# Patient Record
Sex: MALE | Race: WHITE | Employment: UNEMPLOYED | URBAN - METROPOLITAN AREA
[De-identification: names, ages, dates, MRNs, and addresses within clinical notes are randomized per-mention and may not be internally consistent; named-entity substitution may affect disease eponyms.]

---

## 2021-07-29 ENCOUNTER — APPOINTMENT (OUTPATIENT)
Dept: CT IMAGING | Age: 41
End: 2021-07-29
Payer: COMMERCIAL

## 2021-07-29 ENCOUNTER — APPOINTMENT (OUTPATIENT)
Dept: GENERAL RADIOLOGY | Age: 41
End: 2021-07-29
Payer: COMMERCIAL

## 2021-07-29 ENCOUNTER — HOSPITAL ENCOUNTER (EMERGENCY)
Age: 41
Discharge: ANOTHER HOSPITAL | End: 2021-07-30
Attending: STUDENT IN AN ORGANIZED HEALTH CARE EDUCATION/TRAINING PROGRAM
Payer: COMMERCIAL

## 2021-07-29 DIAGNOSIS — F80.9 SPEECH AND LANGUAGE DEFICITS: ICD-10-CM

## 2021-07-29 DIAGNOSIS — G93.89 MASS OF BRAIN: Primary | ICD-10-CM

## 2021-07-29 LAB
A/G RATIO: 1.7 (ref 1.1–2.2)
ACETAMINOPHEN LEVEL: <5 UG/ML (ref 10–30)
ALBUMIN SERPL-MCNC: 4.5 G/DL (ref 3.4–5)
ALP BLD-CCNC: 65 U/L (ref 40–129)
ALT SERPL-CCNC: 66 U/L (ref 10–40)
AMPHETAMINE SCREEN, URINE: NORMAL
ANION GAP SERPL CALCULATED.3IONS-SCNC: 13 MMOL/L (ref 3–16)
AST SERPL-CCNC: 32 U/L (ref 15–37)
BARBITURATE SCREEN URINE: NORMAL
BASOPHILS ABSOLUTE: 0 K/UL (ref 0–0.2)
BASOPHILS RELATIVE PERCENT: 0.5 %
BENZODIAZEPINE SCREEN, URINE: NORMAL
BILIRUB SERPL-MCNC: 0.6 MG/DL (ref 0–1)
BILIRUBIN URINE: NEGATIVE
BLOOD, URINE: NEGATIVE
BUN BLDV-MCNC: 11 MG/DL (ref 7–20)
CALCIUM SERPL-MCNC: 9.3 MG/DL (ref 8.3–10.6)
CANNABINOID SCREEN URINE: NORMAL
CHLORIDE BLD-SCNC: 97 MMOL/L (ref 99–110)
CLARITY: CLEAR
CO2: 25 MMOL/L (ref 21–32)
COCAINE METABOLITE SCREEN URINE: NORMAL
COLOR: NORMAL
CREAT SERPL-MCNC: 1 MG/DL (ref 0.9–1.3)
EOSINOPHILS ABSOLUTE: 0 K/UL (ref 0–0.6)
EOSINOPHILS RELATIVE PERCENT: 0.2 %
ETHANOL: NORMAL MG/DL (ref 0–0.08)
GFR AFRICAN AMERICAN: >60
GFR NON-AFRICAN AMERICAN: >60
GLOBULIN: 2.6 G/DL
GLUCOSE BLD-MCNC: 134 MG/DL (ref 70–99)
GLUCOSE URINE: NEGATIVE MG/DL
HCT VFR BLD CALC: 44.3 % (ref 40.5–52.5)
HEMOGLOBIN: 15.9 G/DL (ref 13.5–17.5)
KETONES, URINE: NEGATIVE MG/DL
LEUKOCYTE ESTERASE, URINE: NEGATIVE
LYMPHOCYTES ABSOLUTE: 1.9 K/UL (ref 1–5.1)
LYMPHOCYTES RELATIVE PERCENT: 26.1 %
Lab: NORMAL
MCH RBC QN AUTO: 32.4 PG (ref 26–34)
MCHC RBC AUTO-ENTMCNC: 36 G/DL (ref 31–36)
MCV RBC AUTO: 89.9 FL (ref 80–100)
METHADONE SCREEN, URINE: NORMAL
MICROSCOPIC EXAMINATION: NORMAL
MONOCYTES ABSOLUTE: 0.5 K/UL (ref 0–1.3)
MONOCYTES RELATIVE PERCENT: 6.4 %
NEUTROPHILS ABSOLUTE: 4.9 K/UL (ref 1.7–7.7)
NEUTROPHILS RELATIVE PERCENT: 66.8 %
NITRITE, URINE: NEGATIVE
OPIATE SCREEN URINE: NORMAL
OXYCODONE URINE: NORMAL
PDW BLD-RTO: 13.2 % (ref 12.4–15.4)
PH UA: 6
PH UA: 7 (ref 5–8)
PHENCYCLIDINE SCREEN URINE: NORMAL
PLATELET # BLD: 263 K/UL (ref 135–450)
PMV BLD AUTO: 7.6 FL (ref 5–10.5)
POTASSIUM REFLEX MAGNESIUM: 3.9 MMOL/L (ref 3.5–5.1)
PROPOXYPHENE SCREEN: NORMAL
PROTEIN UA: NEGATIVE MG/DL
RBC # BLD: 4.92 M/UL (ref 4.2–5.9)
SALICYLATE, SERUM: <0.3 MG/DL (ref 15–30)
SODIUM BLD-SCNC: 135 MMOL/L (ref 136–145)
SPECIFIC GRAVITY UA: <=1.005 (ref 1–1.03)
TOTAL PROTEIN: 7.1 G/DL (ref 6.4–8.2)
TROPONIN: <0.01 NG/ML
URINE REFLEX TO CULTURE: NORMAL
URINE TYPE: NORMAL
UROBILINOGEN, URINE: 0.2 E.U./DL
WBC # BLD: 7.4 K/UL (ref 4–11)

## 2021-07-29 PROCEDURE — 93005 ELECTROCARDIOGRAM TRACING: CPT | Performed by: STUDENT IN AN ORGANIZED HEALTH CARE EDUCATION/TRAINING PROGRAM

## 2021-07-29 PROCEDURE — 70450 CT HEAD/BRAIN W/O DYE: CPT

## 2021-07-29 PROCEDURE — 70496 CT ANGIOGRAPHY HEAD: CPT

## 2021-07-29 PROCEDURE — 6360000002 HC RX W HCPCS: Performed by: STUDENT IN AN ORGANIZED HEALTH CARE EDUCATION/TRAINING PROGRAM

## 2021-07-29 PROCEDURE — 96365 THER/PROPH/DIAG IV INF INIT: CPT

## 2021-07-29 PROCEDURE — 85025 COMPLETE CBC W/AUTO DIFF WBC: CPT

## 2021-07-29 PROCEDURE — 80179 DRUG ASSAY SALICYLATE: CPT

## 2021-07-29 PROCEDURE — 2580000003 HC RX 258: Performed by: STUDENT IN AN ORGANIZED HEALTH CARE EDUCATION/TRAINING PROGRAM

## 2021-07-29 PROCEDURE — 82077 ASSAY SPEC XCP UR&BREATH IA: CPT

## 2021-07-29 PROCEDURE — 71046 X-RAY EXAM CHEST 2 VIEWS: CPT

## 2021-07-29 PROCEDURE — 81003 URINALYSIS AUTO W/O SCOPE: CPT

## 2021-07-29 PROCEDURE — 96375 TX/PRO/DX INJ NEW DRUG ADDON: CPT

## 2021-07-29 PROCEDURE — 6360000004 HC RX CONTRAST MEDICATION: Performed by: STUDENT IN AN ORGANIZED HEALTH CARE EDUCATION/TRAINING PROGRAM

## 2021-07-29 PROCEDURE — 6370000000 HC RX 637 (ALT 250 FOR IP): Performed by: STUDENT IN AN ORGANIZED HEALTH CARE EDUCATION/TRAINING PROGRAM

## 2021-07-29 PROCEDURE — 80307 DRUG TEST PRSMV CHEM ANLYZR: CPT

## 2021-07-29 PROCEDURE — 80053 COMPREHEN METABOLIC PANEL: CPT

## 2021-07-29 PROCEDURE — 80143 DRUG ASSAY ACETAMINOPHEN: CPT

## 2021-07-29 PROCEDURE — 99285 EMERGENCY DEPT VISIT HI MDM: CPT

## 2021-07-29 PROCEDURE — 84484 ASSAY OF TROPONIN QUANT: CPT

## 2021-07-29 RX ORDER — 0.9 % SODIUM CHLORIDE 0.9 %
1000 INTRAVENOUS SOLUTION INTRAVENOUS ONCE
Status: COMPLETED | OUTPATIENT
Start: 2021-07-29 | End: 2021-07-29

## 2021-07-29 RX ORDER — HYDROCODONE BITARTRATE AND ACETAMINOPHEN 5; 325 MG/1; MG/1
1 TABLET ORAL ONCE
Status: COMPLETED | OUTPATIENT
Start: 2021-07-30 | End: 2021-07-29

## 2021-07-29 RX ORDER — LEVETIRACETAM 10 MG/ML
1000 INJECTION INTRAVASCULAR ONCE
Status: COMPLETED | OUTPATIENT
Start: 2021-07-29 | End: 2021-07-29

## 2021-07-29 RX ORDER — DEXAMETHASONE SODIUM PHOSPHATE 10 MG/ML
10 INJECTION, SOLUTION INTRAMUSCULAR; INTRAVENOUS ONCE
Status: COMPLETED | OUTPATIENT
Start: 2021-07-29 | End: 2021-07-29

## 2021-07-29 RX ADMIN — HYDROCODONE BITARTRATE AND ACETAMINOPHEN 1 TABLET: 5; 325 TABLET ORAL at 23:58

## 2021-07-29 RX ADMIN — IOPAMIDOL 75 ML: 755 INJECTION, SOLUTION INTRAVENOUS at 22:04

## 2021-07-29 RX ADMIN — SODIUM CHLORIDE 1000 ML: 9 INJECTION, SOLUTION INTRAVENOUS at 20:55

## 2021-07-29 RX ADMIN — DEXAMETHASONE SODIUM PHOSPHATE 10 MG: 10 INJECTION, SOLUTION INTRAMUSCULAR; INTRAVENOUS at 23:19

## 2021-07-29 RX ADMIN — LEVETIRACETAM 1000 MG: 10 INJECTION INTRAVENOUS at 23:22

## 2021-07-29 ASSESSMENT — PAIN SCALES - GENERAL
PAINLEVEL_OUTOF10: 5
PAINLEVEL_OUTOF10: 5

## 2021-07-30 ENCOUNTER — ANESTHESIA EVENT (OUTPATIENT)
Dept: OPERATING ROOM | Age: 41
DRG: 021 | End: 2021-07-30
Payer: COMMERCIAL

## 2021-07-30 ENCOUNTER — HOSPITAL ENCOUNTER (INPATIENT)
Age: 41
LOS: 5 days | Discharge: HOME HEALTH | DRG: 021 | End: 2021-08-04
Attending: INTERNAL MEDICINE | Admitting: INTERNAL MEDICINE
Payer: COMMERCIAL

## 2021-07-30 ENCOUNTER — APPOINTMENT (OUTPATIENT)
Dept: MRI IMAGING | Age: 41
DRG: 021 | End: 2021-07-30
Attending: INTERNAL MEDICINE
Payer: COMMERCIAL

## 2021-07-30 ENCOUNTER — ANESTHESIA (OUTPATIENT)
Dept: OPERATING ROOM | Age: 41
DRG: 021 | End: 2021-07-30
Payer: COMMERCIAL

## 2021-07-30 ENCOUNTER — APPOINTMENT (OUTPATIENT)
Dept: CT IMAGING | Age: 41
DRG: 021 | End: 2021-07-30
Attending: INTERNAL MEDICINE
Payer: COMMERCIAL

## 2021-07-30 VITALS
HEART RATE: 68 BPM | TEMPERATURE: 97.8 F | RESPIRATION RATE: 16 BRPM | OXYGEN SATURATION: 100 % | DIASTOLIC BLOOD PRESSURE: 63 MMHG | SYSTOLIC BLOOD PRESSURE: 105 MMHG

## 2021-07-30 VITALS — SYSTOLIC BLOOD PRESSURE: 102 MMHG | OXYGEN SATURATION: 96 % | TEMPERATURE: 90.5 F | DIASTOLIC BLOOD PRESSURE: 57 MMHG

## 2021-07-30 DIAGNOSIS — Z98.890 S/P CRANIOTOMY: Primary | ICD-10-CM

## 2021-07-30 PROBLEM — R40.0 SOMNOLENCE: Status: ACTIVE | Noted: 2021-07-30

## 2021-07-30 PROBLEM — R47.02 DYSPHASIA: Status: ACTIVE | Noted: 2021-07-30

## 2021-07-30 LAB
A/G RATIO: 1.8 (ref 1.1–2.2)
ALBUMIN SERPL-MCNC: 4.2 G/DL (ref 3.4–5)
ALBUMIN SERPL-MCNC: 4.4 G/DL (ref 3.4–5)
ALP BLD-CCNC: 64 U/L (ref 40–129)
ALT SERPL-CCNC: 62 U/L (ref 10–40)
ANION GAP SERPL CALCULATED.3IONS-SCNC: 11 MMOL/L (ref 3–16)
ANION GAP SERPL CALCULATED.3IONS-SCNC: 11 MMOL/L (ref 3–16)
APTT: 33 SEC (ref 26.2–38.6)
AST SERPL-CCNC: 25 U/L (ref 15–37)
BASOPHILS ABSOLUTE: 0 K/UL (ref 0–0.2)
BASOPHILS RELATIVE PERCENT: 0.1 %
BILIRUB SERPL-MCNC: 0.6 MG/DL (ref 0–1)
BUN BLDV-MCNC: 11 MG/DL (ref 7–20)
BUN BLDV-MCNC: 13 MG/DL (ref 7–20)
C-REACTIVE PROTEIN: <3 MG/L (ref 0–5.1)
CALCIUM SERPL-MCNC: 9.2 MG/DL (ref 8.3–10.6)
CALCIUM SERPL-MCNC: 9.2 MG/DL (ref 8.3–10.6)
CHLORIDE BLD-SCNC: 102 MMOL/L (ref 99–110)
CHLORIDE BLD-SCNC: 103 MMOL/L (ref 99–110)
CO2: 23 MMOL/L (ref 21–32)
CO2: 24 MMOL/L (ref 21–32)
COLLAGEN ADENOSINE-5'-DIPHOSPHATE (ADP) TIME: 275 SEC (ref 56–110)
COLLAGEN EPINEPHRINE TIME: 105 SEC (ref 86–194)
CREAT SERPL-MCNC: 0.8 MG/DL (ref 0.9–1.3)
CREAT SERPL-MCNC: 0.8 MG/DL (ref 0.9–1.3)
EKG ATRIAL RATE: 79 BPM
EKG DIAGNOSIS: NORMAL
EKG P AXIS: 51 DEGREES
EKG P-R INTERVAL: 126 MS
EKG Q-T INTERVAL: 392 MS
EKG QRS DURATION: 92 MS
EKG QTC CALCULATION (BAZETT): 449 MS
EKG R AXIS: 64 DEGREES
EKG T AXIS: 52 DEGREES
EKG VENTRICULAR RATE: 79 BPM
EOSINOPHILS ABSOLUTE: 0 K/UL (ref 0–0.6)
EOSINOPHILS RELATIVE PERCENT: 0 %
FIBRINOGEN: 249 MG/DL (ref 200–397)
GFR AFRICAN AMERICAN: >60
GFR AFRICAN AMERICAN: >60
GFR NON-AFRICAN AMERICAN: >60
GFR NON-AFRICAN AMERICAN: >60
GLOBULIN: 2.5 G/DL
GLUCOSE BLD-MCNC: 132 MG/DL (ref 70–99)
GLUCOSE BLD-MCNC: 144 MG/DL (ref 70–99)
HAV IGM SER IA-ACNC: NORMAL
HCT VFR BLD CALC: 43.9 % (ref 40.5–52.5)
HCT VFR BLD CALC: 44.5 % (ref 40.5–52.5)
HEMOGLOBIN: 15.4 G/DL (ref 13.5–17.5)
HEMOGLOBIN: 15.7 G/DL (ref 13.5–17.5)
HEPATITIS B CORE IGM ANTIBODY: NORMAL
HEPATITIS B SURFACE ANTIGEN INTERPRETATION: NORMAL
HEPATITIS C ANTIBODY INTERPRETATION: NORMAL
INR BLD: 1.06 (ref 0.88–1.12)
LYMPHOCYTES ABSOLUTE: 1 K/UL (ref 1–5.1)
LYMPHOCYTES RELATIVE PERCENT: 11.6 %
MAGNESIUM: 2.6 MG/DL (ref 1.8–2.4)
MCH RBC QN AUTO: 32.2 PG (ref 26–34)
MCH RBC QN AUTO: 32.3 PG (ref 26–34)
MCHC RBC AUTO-ENTMCNC: 35.1 G/DL (ref 31–36)
MCHC RBC AUTO-ENTMCNC: 35.2 G/DL (ref 31–36)
MCV RBC AUTO: 91.7 FL (ref 80–100)
MCV RBC AUTO: 92.2 FL (ref 80–100)
MONOCYTES ABSOLUTE: 0.1 K/UL (ref 0–1.3)
MONOCYTES RELATIVE PERCENT: 1.3 %
NEUTROPHILS ABSOLUTE: 7.5 K/UL (ref 1.7–7.7)
NEUTROPHILS RELATIVE PERCENT: 87 %
PDW BLD-RTO: 13 % (ref 12.4–15.4)
PDW BLD-RTO: 13.2 % (ref 12.4–15.4)
PHOSPHORUS: 3.3 MG/DL (ref 2.5–4.9)
PLATELET # BLD: 306 K/UL (ref 135–450)
PLATELET # BLD: 306 K/UL (ref 135–450)
PLATELET FUNCTION INTERPRETATION: ABNORMAL
PMV BLD AUTO: 7.7 FL (ref 5–10.5)
PMV BLD AUTO: 7.8 FL (ref 5–10.5)
POTASSIUM SERPL-SCNC: 4.2 MMOL/L (ref 3.5–5.1)
POTASSIUM SERPL-SCNC: 4.3 MMOL/L (ref 3.5–5.1)
PROTHROMBIN TIME: 12 SEC (ref 9.9–12.7)
RBC # BLD: 4.76 M/UL (ref 4.2–5.9)
RBC # BLD: 4.86 M/UL (ref 4.2–5.9)
SEDIMENTATION RATE, ERYTHROCYTE: 4 MM/HR (ref 0–15)
SODIUM BLD-SCNC: 137 MMOL/L (ref 136–145)
SODIUM BLD-SCNC: 137 MMOL/L (ref 136–145)
TOTAL PROTEIN: 6.9 G/DL (ref 6.4–8.2)
WBC # BLD: 8.1 K/UL (ref 4–11)
WBC # BLD: 8.6 K/UL (ref 4–11)

## 2021-07-30 PROCEDURE — 93010 ELECTROCARDIOGRAM REPORT: CPT | Performed by: INTERNAL MEDICINE

## 2021-07-30 PROCEDURE — 86701 HIV-1ANTIBODY: CPT

## 2021-07-30 PROCEDURE — 00C00ZZ EXTIRPATION OF MATTER FROM BRAIN, OPEN APPROACH: ICD-10-PCS | Performed by: NEUROLOGICAL SURGERY

## 2021-07-30 PROCEDURE — 2720000010 HC SURG SUPPLY STERILE: Performed by: NEUROLOGICAL SURGERY

## 2021-07-30 PROCEDURE — 3600000014 HC SURGERY LEVEL 4 ADDTL 15MIN: Performed by: NEUROLOGICAL SURGERY

## 2021-07-30 PROCEDURE — 85610 PROTHROMBIN TIME: CPT

## 2021-07-30 PROCEDURE — 80074 ACUTE HEPATITIS PANEL: CPT

## 2021-07-30 PROCEDURE — 2580000003 HC RX 258: Performed by: NURSE ANESTHETIST, CERTIFIED REGISTERED

## 2021-07-30 PROCEDURE — 6370000000 HC RX 637 (ALT 250 FOR IP): Performed by: STUDENT IN AN ORGANIZED HEALTH CARE EDUCATION/TRAINING PROGRAM

## 2021-07-30 PROCEDURE — 7100000001 HC PACU RECOVERY - ADDTL 15 MIN: Performed by: NEUROLOGICAL SURGERY

## 2021-07-30 PROCEDURE — 36415 COLL VENOUS BLD VENIPUNCTURE: CPT

## 2021-07-30 PROCEDURE — 87390 HIV-1 AG IA: CPT

## 2021-07-30 PROCEDURE — 87015 SPECIMEN INFECT AGNT CONCNTJ: CPT

## 2021-07-30 PROCEDURE — 6360000002 HC RX W HCPCS: Performed by: ANESTHESIOLOGY

## 2021-07-30 PROCEDURE — 88112 CYTOPATH CELL ENHANCE TECH: CPT

## 2021-07-30 PROCEDURE — 87075 CULTR BACTERIA EXCEPT BLOOD: CPT

## 2021-07-30 PROCEDURE — 83735 ASSAY OF MAGNESIUM: CPT

## 2021-07-30 PROCEDURE — 87176 TISSUE HOMOGENIZATION CULTR: CPT

## 2021-07-30 PROCEDURE — 99255 IP/OBS CONSLTJ NEW/EST HI 80: CPT | Performed by: INTERNAL MEDICINE

## 2021-07-30 PROCEDURE — 2580000003 HC RX 258: Performed by: STUDENT IN AN ORGANIZED HEALTH CARE EDUCATION/TRAINING PROGRAM

## 2021-07-30 PROCEDURE — 86702 HIV-2 ANTIBODY: CPT

## 2021-07-30 PROCEDURE — 6360000002 HC RX W HCPCS: Performed by: NEUROLOGICAL SURGERY

## 2021-07-30 PROCEDURE — 2580000003 HC RX 258: Performed by: NEUROLOGICAL SURGERY

## 2021-07-30 PROCEDURE — 87077 CULTURE AEROBIC IDENTIFY: CPT

## 2021-07-30 PROCEDURE — 3600000004 HC SURGERY LEVEL 4 BASE: Performed by: NEUROLOGICAL SURGERY

## 2021-07-30 PROCEDURE — C1713 ANCHOR/SCREW BN/BN,TIS/BN: HCPCS | Performed by: NEUROLOGICAL SURGERY

## 2021-07-30 PROCEDURE — 2709999900 HC NON-CHARGEABLE SUPPLY: Performed by: NEUROLOGICAL SURGERY

## 2021-07-30 PROCEDURE — 6360000002 HC RX W HCPCS: Performed by: NURSE ANESTHETIST, CERTIFIED REGISTERED

## 2021-07-30 PROCEDURE — 3700000000 HC ANESTHESIA ATTENDED CARE: Performed by: NEUROLOGICAL SURGERY

## 2021-07-30 PROCEDURE — 87076 CULTURE ANAEROBE IDENT EACH: CPT

## 2021-07-30 PROCEDURE — 86360 T CELL ABSOLUTE COUNT/RATIO: CPT

## 2021-07-30 PROCEDURE — 87205 SMEAR GRAM STAIN: CPT

## 2021-07-30 PROCEDURE — 85730 THROMBOPLASTIN TIME PARTIAL: CPT

## 2021-07-30 PROCEDURE — 88305 TISSUE EXAM BY PATHOLOGIST: CPT

## 2021-07-30 PROCEDURE — 85384 FIBRINOGEN ACTIVITY: CPT

## 2021-07-30 PROCEDURE — 70553 MRI BRAIN STEM W/O & W/DYE: CPT

## 2021-07-30 PROCEDURE — 87070 CULTURE OTHR SPECIMN AEROBIC: CPT

## 2021-07-30 PROCEDURE — 2500000003 HC RX 250 WO HCPCS: Performed by: NEUROLOGICAL SURGERY

## 2021-07-30 PROCEDURE — A9576 INJ PROHANCE MULTIPACK: HCPCS | Performed by: NEUROLOGICAL SURGERY

## 2021-07-30 PROCEDURE — 2500000003 HC RX 250 WO HCPCS: Performed by: NURSE ANESTHETIST, CERTIFIED REGISTERED

## 2021-07-30 PROCEDURE — 6360000002 HC RX W HCPCS: Performed by: STUDENT IN AN ORGANIZED HEALTH CARE EDUCATION/TRAINING PROGRAM

## 2021-07-30 PROCEDURE — 3700000001 HC ADD 15 MINUTES (ANESTHESIA): Performed by: NEUROLOGICAL SURGERY

## 2021-07-30 PROCEDURE — 85025 COMPLETE CBC W/AUTO DIFF WBC: CPT

## 2021-07-30 PROCEDURE — 87206 SMEAR FLUORESCENT/ACID STAI: CPT

## 2021-07-30 PROCEDURE — 87153 DNA/RNA SEQUENCING: CPT

## 2021-07-30 PROCEDURE — 00B00ZX EXCISION OF BRAIN, OPEN APPROACH, DIAGNOSTIC: ICD-10-PCS | Performed by: NEUROLOGICAL SURGERY

## 2021-07-30 PROCEDURE — 6370000000 HC RX 637 (ALT 250 FOR IP): Performed by: NEUROLOGICAL SURGERY

## 2021-07-30 PROCEDURE — 2060000000 HC ICU INTERMEDIATE R&B

## 2021-07-30 PROCEDURE — 6360000004 HC RX CONTRAST MEDICATION: Performed by: NEUROLOGICAL SURGERY

## 2021-07-30 PROCEDURE — 87116 MYCOBACTERIA CULTURE: CPT

## 2021-07-30 PROCEDURE — 80053 COMPREHEN METABOLIC PANEL: CPT

## 2021-07-30 PROCEDURE — 85652 RBC SED RATE AUTOMATED: CPT

## 2021-07-30 PROCEDURE — 7100000000 HC PACU RECOVERY - FIRST 15 MIN: Performed by: NEUROLOGICAL SURGERY

## 2021-07-30 PROCEDURE — 89050 BODY FLUID CELL COUNT: CPT

## 2021-07-30 PROCEDURE — 85027 COMPLETE CBC AUTOMATED: CPT

## 2021-07-30 PROCEDURE — 71250 CT THORAX DX C-: CPT

## 2021-07-30 PROCEDURE — 85576 BLOOD PLATELET AGGREGATION: CPT

## 2021-07-30 PROCEDURE — 86140 C-REACTIVE PROTEIN: CPT

## 2021-07-30 PROCEDURE — 87102 FUNGUS ISOLATION CULTURE: CPT

## 2021-07-30 DEVICE — PLATE BNE L12MM THK0.4MM 2 H CRANIOMAXILLOFACIAL BILAT BLU: Type: IMPLANTABLE DEVICE | Site: CRANIAL | Status: FUNCTIONAL

## 2021-07-30 DEVICE — SCREW BNE L4MM DIA1.5MM CRANIOFACIAL TI SELF DRL: Type: IMPLANTABLE DEVICE | Site: CRANIAL | Status: FUNCTIONAL

## 2021-07-30 DEVICE — COVER BUR H DIA17MM 6 H CRANIOMAXILLOFACIAL BLU TI: Type: IMPLANTABLE DEVICE | Site: CRANIAL | Status: FUNCTIONAL

## 2021-07-30 RX ORDER — POLYETHYLENE GLYCOL 3350 17 G/17G
17 POWDER, FOR SOLUTION ORAL DAILY PRN
Status: DISCONTINUED | OUTPATIENT
Start: 2021-07-30 | End: 2021-07-31

## 2021-07-30 RX ORDER — OXYCODONE HYDROCHLORIDE AND ACETAMINOPHEN 5; 325 MG/1; MG/1
1 TABLET ORAL
Status: ACTIVE | OUTPATIENT
Start: 2021-07-30 | End: 2021-07-30

## 2021-07-30 RX ORDER — PROMETHAZINE HYDROCHLORIDE 25 MG/ML
6.25 INJECTION, SOLUTION INTRAMUSCULAR; INTRAVENOUS
Status: ACTIVE | OUTPATIENT
Start: 2021-07-30 | End: 2021-07-30

## 2021-07-30 RX ORDER — LABETALOL HYDROCHLORIDE 5 MG/ML
5 INJECTION, SOLUTION INTRAVENOUS EVERY 10 MIN PRN
Status: DISCONTINUED | OUTPATIENT
Start: 2021-07-30 | End: 2021-07-31

## 2021-07-30 RX ORDER — SODIUM CHLORIDE 9 MG/ML
INJECTION, SOLUTION INTRAVENOUS CONTINUOUS
Status: DISCONTINUED | OUTPATIENT
Start: 2021-07-30 | End: 2021-07-31

## 2021-07-30 RX ORDER — SODIUM CHLORIDE, SODIUM LACTATE, POTASSIUM CHLORIDE, CALCIUM CHLORIDE 600; 310; 30; 20 MG/100ML; MG/100ML; MG/100ML; MG/100ML
INJECTION, SOLUTION INTRAVENOUS CONTINUOUS PRN
Status: DISCONTINUED | OUTPATIENT
Start: 2021-07-30 | End: 2021-07-30 | Stop reason: SDUPTHER

## 2021-07-30 RX ORDER — FENTANYL CITRATE 50 UG/ML
50 INJECTION, SOLUTION INTRAMUSCULAR; INTRAVENOUS EVERY 5 MIN PRN
Status: DISCONTINUED | OUTPATIENT
Start: 2021-07-30 | End: 2021-07-31

## 2021-07-30 RX ORDER — HYDRALAZINE HYDROCHLORIDE 20 MG/ML
5 INJECTION INTRAMUSCULAR; INTRAVENOUS EVERY 10 MIN PRN
Status: DISCONTINUED | OUTPATIENT
Start: 2021-07-30 | End: 2021-07-31

## 2021-07-30 RX ORDER — DEXAMETHASONE 4 MG/1
4 TABLET ORAL EVERY 12 HOURS SCHEDULED
Status: DISCONTINUED | OUTPATIENT
Start: 2021-07-30 | End: 2021-07-30

## 2021-07-30 RX ORDER — SUCCINYLCHOLINE/SOD CL,ISO/PF 200MG/10ML
SYRINGE (ML) INTRAVENOUS PRN
Status: DISCONTINUED | OUTPATIENT
Start: 2021-07-30 | End: 2021-07-30 | Stop reason: SDUPTHER

## 2021-07-30 RX ORDER — FENTANYL CITRATE 50 UG/ML
25 INJECTION, SOLUTION INTRAMUSCULAR; INTRAVENOUS EVERY 5 MIN PRN
Status: DISCONTINUED | OUTPATIENT
Start: 2021-07-30 | End: 2021-07-31

## 2021-07-30 RX ORDER — PROPOFOL 10 MG/ML
INJECTION, EMULSION INTRAVENOUS PRN
Status: DISCONTINUED | OUTPATIENT
Start: 2021-07-30 | End: 2021-07-30 | Stop reason: SDUPTHER

## 2021-07-30 RX ORDER — CEFAZOLIN SODIUM 1 G/3ML
INJECTION, POWDER, FOR SOLUTION INTRAMUSCULAR; INTRAVENOUS PRN
Status: DISCONTINUED | OUTPATIENT
Start: 2021-07-30 | End: 2021-07-30 | Stop reason: SDUPTHER

## 2021-07-30 RX ORDER — MEPERIDINE HYDROCHLORIDE 25 MG/ML
12.5 INJECTION INTRAMUSCULAR; INTRAVENOUS; SUBCUTANEOUS EVERY 5 MIN PRN
Status: DISCONTINUED | OUTPATIENT
Start: 2021-07-30 | End: 2021-07-31

## 2021-07-30 RX ORDER — LEVETIRACETAM 500 MG/1
500 TABLET ORAL 2 TIMES DAILY
Status: DISCONTINUED | OUTPATIENT
Start: 2021-07-30 | End: 2021-07-31

## 2021-07-30 RX ORDER — REMIFENTANIL HYDROCHLORIDE 1 MG/ML
INJECTION, POWDER, LYOPHILIZED, FOR SOLUTION INTRAVENOUS CONTINUOUS PRN
Status: DISCONTINUED | OUTPATIENT
Start: 2021-07-30 | End: 2021-07-30 | Stop reason: SDUPTHER

## 2021-07-30 RX ORDER — SODIUM CHLORIDE 9 MG/ML
25 INJECTION, SOLUTION INTRAVENOUS PRN
Status: DISCONTINUED | OUTPATIENT
Start: 2021-07-30 | End: 2021-07-31

## 2021-07-30 RX ORDER — LIDOCAINE HYDROCHLORIDE AND EPINEPHRINE 10; 10 MG/ML; UG/ML
INJECTION, SOLUTION INFILTRATION; PERINEURAL PRN
Status: DISCONTINUED | OUTPATIENT
Start: 2021-07-30 | End: 2021-07-31

## 2021-07-30 RX ORDER — PANTOPRAZOLE SODIUM 40 MG/1
40 TABLET, DELAYED RELEASE ORAL
Status: DISCONTINUED | OUTPATIENT
Start: 2021-07-31 | End: 2021-08-04 | Stop reason: HOSPADM

## 2021-07-30 RX ORDER — ONDANSETRON 4 MG/1
4 TABLET, ORALLY DISINTEGRATING ORAL EVERY 8 HOURS PRN
Status: DISCONTINUED | OUTPATIENT
Start: 2021-07-30 | End: 2021-07-31

## 2021-07-30 RX ORDER — DEXAMETHASONE SODIUM PHOSPHATE 4 MG/ML
INJECTION, SOLUTION INTRA-ARTICULAR; INTRALESIONAL; INTRAMUSCULAR; INTRAVENOUS; SOFT TISSUE PRN
Status: DISCONTINUED | OUTPATIENT
Start: 2021-07-30 | End: 2021-07-30 | Stop reason: SDUPTHER

## 2021-07-30 RX ORDER — ONDANSETRON 2 MG/ML
4 INJECTION INTRAMUSCULAR; INTRAVENOUS
Status: ACTIVE | OUTPATIENT
Start: 2021-07-30 | End: 2021-07-30

## 2021-07-30 RX ORDER — FENTANYL CITRATE 50 UG/ML
INJECTION, SOLUTION INTRAMUSCULAR; INTRAVENOUS PRN
Status: DISCONTINUED | OUTPATIENT
Start: 2021-07-30 | End: 2021-07-30 | Stop reason: SDUPTHER

## 2021-07-30 RX ORDER — LABETALOL HYDROCHLORIDE 5 MG/ML
10 INJECTION, SOLUTION INTRAVENOUS EVERY 6 HOURS PRN
Status: DISCONTINUED | OUTPATIENT
Start: 2021-07-30 | End: 2021-08-04 | Stop reason: HOSPADM

## 2021-07-30 RX ORDER — DEXAMETHASONE 4 MG/1
4 TABLET ORAL EVERY 6 HOURS
Status: DISCONTINUED | OUTPATIENT
Start: 2021-07-30 | End: 2021-07-31

## 2021-07-30 RX ORDER — ACETAMINOPHEN 650 MG/1
650 SUPPOSITORY RECTAL EVERY 6 HOURS PRN
Status: DISCONTINUED | OUTPATIENT
Start: 2021-07-30 | End: 2021-07-31

## 2021-07-30 RX ORDER — ONDANSETRON 2 MG/ML
4 INJECTION INTRAMUSCULAR; INTRAVENOUS EVERY 6 HOURS PRN
Status: DISCONTINUED | OUTPATIENT
Start: 2021-07-30 | End: 2021-07-31

## 2021-07-30 RX ORDER — LEVETIRACETAM 500 MG/1
1000 TABLET ORAL 2 TIMES DAILY
Status: CANCELLED | OUTPATIENT
Start: 2021-07-31

## 2021-07-30 RX ORDER — PROPOFOL 10 MG/ML
INJECTION, EMULSION INTRAVENOUS CONTINUOUS PRN
Status: DISCONTINUED | OUTPATIENT
Start: 2021-07-30 | End: 2021-07-30 | Stop reason: SDUPTHER

## 2021-07-30 RX ORDER — ONDANSETRON 2 MG/ML
INJECTION INTRAMUSCULAR; INTRAVENOUS PRN
Status: DISCONTINUED | OUTPATIENT
Start: 2021-07-30 | End: 2021-07-30 | Stop reason: SDUPTHER

## 2021-07-30 RX ORDER — SODIUM CHLORIDE 0.9 % (FLUSH) 0.9 %
5-40 SYRINGE (ML) INJECTION PRN
Status: DISCONTINUED | OUTPATIENT
Start: 2021-07-30 | End: 2021-07-31

## 2021-07-30 RX ORDER — SODIUM CHLORIDE 9 MG/ML
INJECTION, SOLUTION INTRAVENOUS CONTINUOUS PRN
Status: DISCONTINUED | OUTPATIENT
Start: 2021-07-30 | End: 2021-07-30 | Stop reason: SDUPTHER

## 2021-07-30 RX ORDER — NALOXONE HYDROCHLORIDE 0.4 MG/ML
INJECTION, SOLUTION INTRAMUSCULAR; INTRAVENOUS; SUBCUTANEOUS PRN
Status: DISCONTINUED | OUTPATIENT
Start: 2021-07-30 | End: 2021-07-30 | Stop reason: SDUPTHER

## 2021-07-30 RX ORDER — LEVETIRACETAM 500 MG/5ML
INJECTION, SOLUTION, CONCENTRATE INTRAVENOUS PRN
Status: DISCONTINUED | OUTPATIENT
Start: 2021-07-30 | End: 2021-07-30 | Stop reason: SDUPTHER

## 2021-07-30 RX ORDER — SODIUM CHLORIDE 0.9 % (FLUSH) 0.9 %
5-40 SYRINGE (ML) INJECTION EVERY 12 HOURS SCHEDULED
Status: DISCONTINUED | OUTPATIENT
Start: 2021-07-30 | End: 2021-07-31

## 2021-07-30 RX ORDER — ACETAMINOPHEN 325 MG/1
650 TABLET ORAL EVERY 6 HOURS PRN
Status: DISCONTINUED | OUTPATIENT
Start: 2021-07-30 | End: 2021-07-31

## 2021-07-30 RX ADMIN — DEXAMETHASONE 4 MG: 4 TABLET ORAL at 05:20

## 2021-07-30 RX ADMIN — PROPOFOL 200 MG: 10 INJECTION, EMULSION INTRAVENOUS at 18:24

## 2021-07-30 RX ADMIN — ACETAMINOPHEN 650 MG: 325 TABLET ORAL at 15:42

## 2021-07-30 RX ADMIN — ONDANSETRON 4 MG: 2 INJECTION INTRAMUSCULAR; INTRAVENOUS at 19:11

## 2021-07-30 RX ADMIN — REMIFENTANIL HYDROCHLORIDE 0.25 MCG/KG/MIN: 1 INJECTION, POWDER, LYOPHILIZED, FOR SOLUTION INTRAVENOUS at 20:08

## 2021-07-30 RX ADMIN — DEXAMETHASONE 4 MG: 4 TABLET ORAL at 11:18

## 2021-07-30 RX ADMIN — FENTANYL CITRATE 100 MCG: 50 INJECTION, SOLUTION INTRAMUSCULAR; INTRAVENOUS at 18:50

## 2021-07-30 RX ADMIN — FENTANYL CITRATE 25 MCG: 50 INJECTION, SOLUTION INTRAMUSCULAR; INTRAVENOUS at 23:45

## 2021-07-30 RX ADMIN — GADOTERIDOL 18 ML: 279.3 INJECTION, SOLUTION INTRAVENOUS at 08:50

## 2021-07-30 RX ADMIN — FENTANYL CITRATE 100 MCG: 50 INJECTION, SOLUTION INTRAMUSCULAR; INTRAVENOUS at 22:02

## 2021-07-30 RX ADMIN — NALOXONE HYDROCHLORIDE 0.08 MG: 0.4 INJECTION, SOLUTION INTRAMUSCULAR; INTRAVENOUS; SUBCUTANEOUS at 22:31

## 2021-07-30 RX ADMIN — MEPERIDINE HYDROCHLORIDE 12.5 MG: 25 INJECTION, SOLUTION INTRAMUSCULAR; INTRAVENOUS; SUBCUTANEOUS at 23:20

## 2021-07-30 RX ADMIN — SODIUM CHLORIDE: 9 INJECTION, SOLUTION INTRAVENOUS at 21:12

## 2021-07-30 RX ADMIN — LEVETIRACETAM 500 MG: 500 TABLET ORAL at 09:55

## 2021-07-30 RX ADMIN — REMIFENTANIL HYDROCHLORIDE 0.15 MCG/KG/MIN: 1 INJECTION, POWDER, LYOPHILIZED, FOR SOLUTION INTRAVENOUS at 18:28

## 2021-07-30 RX ADMIN — MEPERIDINE HYDROCHLORIDE 12.5 MG: 25 INJECTION, SOLUTION INTRAMUSCULAR; INTRAVENOUS; SUBCUTANEOUS at 23:25

## 2021-07-30 RX ADMIN — SODIUM CHLORIDE, SODIUM LACTATE, POTASSIUM CHLORIDE, AND CALCIUM CHLORIDE: .6; .31; .03; .02 INJECTION, SOLUTION INTRAVENOUS at 18:17

## 2021-07-30 RX ADMIN — PROPOFOL 50 MG: 10 INJECTION, EMULSION INTRAVENOUS at 18:27

## 2021-07-30 RX ADMIN — CEFAZOLIN SODIUM 2000 MG: 1 POWDER, FOR SOLUTION INTRAMUSCULAR; INTRAVENOUS at 19:07

## 2021-07-30 RX ADMIN — DEXAMETHASONE SODIUM PHOSPHATE 10 MG: 4 INJECTION, SOLUTION INTRAMUSCULAR; INTRAVENOUS at 19:10

## 2021-07-30 RX ADMIN — ACETAMINOPHEN 650 MG: 325 TABLET ORAL at 05:20

## 2021-07-30 RX ADMIN — PROPOFOL 150 MCG/KG/MIN: 10 INJECTION, EMULSION INTRAVENOUS at 18:29

## 2021-07-30 RX ADMIN — REMIFENTANIL HYDROCHLORIDE 0.25 MCG/KG/MIN: 1 INJECTION, POWDER, LYOPHILIZED, FOR SOLUTION INTRAVENOUS at 21:34

## 2021-07-30 RX ADMIN — Medication 10 ML: at 09:58

## 2021-07-30 RX ADMIN — Medication 100 MG: at 18:24

## 2021-07-30 RX ADMIN — ACETAMINOPHEN 650 MG: 325 TABLET ORAL at 11:17

## 2021-07-30 RX ADMIN — PHENYLEPHRINE HYDROCHLORIDE 20 MCG/MIN: 10 INJECTION INTRAVENOUS at 19:01

## 2021-07-30 RX ADMIN — SODIUM CHLORIDE: 9 INJECTION, SOLUTION INTRAVENOUS at 23:22

## 2021-07-30 RX ADMIN — LEVETIRACETAM 1000 MG: 100 INJECTION, SOLUTION INTRAVENOUS at 19:16

## 2021-07-30 ASSESSMENT — PULMONARY FUNCTION TESTS
PIF_VALUE: 27
PIF_VALUE: 18
PIF_VALUE: 19
PIF_VALUE: 16
PIF_VALUE: 17
PIF_VALUE: 19
PIF_VALUE: 16
PIF_VALUE: 18
PIF_VALUE: 15
PIF_VALUE: 15
PIF_VALUE: 18
PIF_VALUE: 18
PIF_VALUE: 19
PIF_VALUE: 15
PIF_VALUE: 18
PIF_VALUE: 5
PIF_VALUE: 17
PIF_VALUE: 13
PIF_VALUE: 15
PIF_VALUE: 24
PIF_VALUE: 15
PIF_VALUE: 15
PIF_VALUE: 18
PIF_VALUE: 1
PIF_VALUE: 18
PIF_VALUE: 9
PIF_VALUE: 18
PIF_VALUE: 15
PIF_VALUE: 18
PIF_VALUE: 17
PIF_VALUE: 15
PIF_VALUE: 18
PIF_VALUE: 15
PIF_VALUE: 15
PIF_VALUE: 17
PIF_VALUE: 18
PIF_VALUE: 15
PIF_VALUE: 17
PIF_VALUE: 12
PIF_VALUE: 24
PIF_VALUE: 19
PIF_VALUE: 27
PIF_VALUE: 15
PIF_VALUE: 19
PIF_VALUE: 16
PIF_VALUE: 2
PIF_VALUE: 18
PIF_VALUE: 15
PIF_VALUE: 18
PIF_VALUE: 2
PIF_VALUE: 18
PIF_VALUE: 13
PIF_VALUE: 11
PIF_VALUE: 17
PIF_VALUE: 17
PIF_VALUE: 13
PIF_VALUE: 15
PIF_VALUE: 24
PIF_VALUE: 11
PIF_VALUE: 18
PIF_VALUE: 18
PIF_VALUE: 16
PIF_VALUE: 18
PIF_VALUE: 18
PIF_VALUE: 14
PIF_VALUE: 18
PIF_VALUE: 24
PIF_VALUE: 18
PIF_VALUE: 21
PIF_VALUE: 12
PIF_VALUE: 17
PIF_VALUE: 15
PIF_VALUE: 18
PIF_VALUE: 18
PIF_VALUE: 15
PIF_VALUE: 15
PIF_VALUE: 18
PIF_VALUE: 19
PIF_VALUE: 18
PIF_VALUE: 15
PIF_VALUE: 18
PIF_VALUE: 18
PIF_VALUE: 25
PIF_VALUE: 15
PIF_VALUE: 15
PIF_VALUE: 14
PIF_VALUE: 18
PIF_VALUE: 15
PIF_VALUE: 18
PIF_VALUE: 18
PIF_VALUE: 2
PIF_VALUE: 15
PIF_VALUE: 2
PIF_VALUE: 18
PIF_VALUE: 18
PIF_VALUE: 1
PIF_VALUE: 19
PIF_VALUE: 18
PIF_VALUE: 15
PIF_VALUE: 18
PIF_VALUE: 4
PIF_VALUE: 24
PIF_VALUE: 18
PIF_VALUE: 12
PIF_VALUE: 27
PIF_VALUE: 19
PIF_VALUE: 18
PIF_VALUE: 1
PIF_VALUE: 14
PIF_VALUE: 17
PIF_VALUE: 24
PIF_VALUE: 18
PIF_VALUE: 18
PIF_VALUE: 15
PIF_VALUE: 1
PIF_VALUE: 14
PIF_VALUE: 14
PIF_VALUE: 18
PIF_VALUE: 13
PIF_VALUE: 18
PIF_VALUE: 14
PIF_VALUE: 15
PIF_VALUE: 24
PIF_VALUE: 1
PIF_VALUE: 18
PIF_VALUE: 20
PIF_VALUE: 14
PIF_VALUE: 15
PIF_VALUE: 13
PIF_VALUE: 18
PIF_VALUE: 10
PIF_VALUE: 15
PIF_VALUE: 13
PIF_VALUE: 14
PIF_VALUE: 15
PIF_VALUE: 18
PIF_VALUE: 1
PIF_VALUE: 13
PIF_VALUE: 18
PIF_VALUE: 13
PIF_VALUE: 19
PIF_VALUE: 15
PIF_VALUE: 24
PIF_VALUE: 15
PIF_VALUE: 19
PIF_VALUE: 18
PIF_VALUE: 16
PIF_VALUE: 18
PIF_VALUE: 16
PIF_VALUE: 21
PIF_VALUE: 19
PIF_VALUE: 15
PIF_VALUE: 12
PIF_VALUE: 18
PIF_VALUE: 16
PIF_VALUE: 15
PIF_VALUE: 11
PIF_VALUE: 18
PIF_VALUE: 1
PIF_VALUE: 14
PIF_VALUE: 15
PIF_VALUE: 21
PIF_VALUE: 16
PIF_VALUE: 18
PIF_VALUE: 19
PIF_VALUE: 13
PIF_VALUE: 12
PIF_VALUE: 15
PIF_VALUE: 18
PIF_VALUE: 15
PIF_VALUE: 18
PIF_VALUE: 19
PIF_VALUE: 18
PIF_VALUE: 18
PIF_VALUE: 14
PIF_VALUE: 19
PIF_VALUE: 19
PIF_VALUE: 13
PIF_VALUE: 18
PIF_VALUE: 18
PIF_VALUE: 17
PIF_VALUE: 11
PIF_VALUE: 18
PIF_VALUE: 3
PIF_VALUE: 17
PIF_VALUE: 18
PIF_VALUE: 19
PIF_VALUE: 18
PIF_VALUE: 19
PIF_VALUE: 15
PIF_VALUE: 18
PIF_VALUE: 14
PIF_VALUE: 18
PIF_VALUE: 15
PIF_VALUE: 18
PIF_VALUE: 3
PIF_VALUE: 18
PIF_VALUE: 19
PIF_VALUE: 21
PIF_VALUE: 18
PIF_VALUE: 8
PIF_VALUE: 18
PIF_VALUE: 24
PIF_VALUE: 15
PIF_VALUE: 18
PIF_VALUE: 19
PIF_VALUE: 17
PIF_VALUE: 17
PIF_VALUE: 23
PIF_VALUE: 14
PIF_VALUE: 19
PIF_VALUE: 15
PIF_VALUE: 26
PIF_VALUE: 24
PIF_VALUE: 18
PIF_VALUE: 13
PIF_VALUE: 18
PIF_VALUE: 27
PIF_VALUE: 18
PIF_VALUE: 15
PIF_VALUE: 14
PIF_VALUE: 13
PIF_VALUE: 16
PIF_VALUE: 14
PIF_VALUE: 12
PIF_VALUE: 18
PIF_VALUE: 15
PIF_VALUE: 19
PIF_VALUE: 12
PIF_VALUE: 17
PIF_VALUE: 18

## 2021-07-30 ASSESSMENT — PAIN DESCRIPTION - PAIN TYPE
TYPE: ACUTE PAIN
TYPE: SURGICAL PAIN

## 2021-07-30 ASSESSMENT — PAIN SCALES - GENERAL
PAINLEVEL_OUTOF10: 3
PAINLEVEL_OUTOF10: 5
PAINLEVEL_OUTOF10: 0
PAINLEVEL_OUTOF10: 0
PAINLEVEL_OUTOF10: 3

## 2021-07-30 ASSESSMENT — PAIN DESCRIPTION - FREQUENCY: FREQUENCY: INTERMITTENT

## 2021-07-30 ASSESSMENT — LIFESTYLE VARIABLES: SMOKING_STATUS: 0

## 2021-07-30 ASSESSMENT — ENCOUNTER SYMPTOMS
VOMITING: 0
ABDOMINAL PAIN: 0
COUGH: 0
SHORTNESS OF BREATH: 0
ABDOMINAL DISTENTION: 0
TROUBLE SWALLOWING: 0
NAUSEA: 0

## 2021-07-30 ASSESSMENT — PAIN DESCRIPTION - DESCRIPTORS
DESCRIPTORS: OTHER (COMMENT)
DESCRIPTORS: HEADACHE

## 2021-07-30 ASSESSMENT — PAIN DESCRIPTION - PROGRESSION: CLINICAL_PROGRESSION: NOT CHANGED

## 2021-07-30 ASSESSMENT — PAIN DESCRIPTION - ORIENTATION: ORIENTATION: OTHER (COMMENT)

## 2021-07-30 ASSESSMENT — PAIN DESCRIPTION - LOCATION
LOCATION: HEAD
LOCATION: HEAD

## 2021-07-30 ASSESSMENT — PAIN DESCRIPTION - ONSET: ONSET: ON-GOING

## 2021-07-30 NOTE — CONSULTS
Infectious Diseases Inpatient Consult Note    RESIDENT NOTE - reviewed / edited, attending note at bottom    Reason for Consult:   Multiple brain masses  Requesting Physician:   Dr Marylin Dooley  Primary Care Physician:  No primary care provider on file. History Obtained From:   Pt, EPIC    Admit Date: 2021  Hospital Day: 1    CHIEF COMPLAINT:     HA, difficulty speaking, dropping things    HISTORY OF PRESENT ILLNESS:    Pj Lacy is a 46 y.o. male with no PMH who presents to the emergency department brought in by his father with c/o AMS and speech difficulty. Onset gradual, over the past 1.5 months, his father has noticed a progressive difficulty in speech, particularly finding the right words and occasionally slurring his words. Pt has not been seen due to cost and being uninsured. Last night having dinner at 1900 the pt felt weak and drop dish of spaghetti and his cup of water. His dad saw this and immediately brought him to the hospital. In the ED the pt could not remember his name,  etc.  After given the IV steriod, the pt said he knew the answers but couldn't get the answers out and was able to answer the questions correctly. In  his mother had symptoms that involved small projection in the skull and could not find out what it was . One night she had a intense headache and went to ED and found out she had aggressive bone tumors involving ribs pelvis, skull. 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 intermittnet visual changes, dropping things at dinner prompting Headaches    In the ED, CT revealed multiple cystic masses with minimal thin peripheral enhancement, vasogenic edema, and mass effect with 7mm midline shift. He was transferred to [facility]. for further management. Today patient is sitting in bed with neck raised. He had some difficulty answering my questions and stares blankly. Couldn't tell me what was bothering him.  No fevers, chills, n/v, abdominal pain, dysuria, diarrhea. Most of the history taken from patient's father. He has been living at home with his father with his entire live his entire life. He is currently unemployed but used to work for a [employer] center. They have no pets at home, but live in a old house. He does not leave the house much. His hobbies consist mostly of video games. The patient reports that he vapes daily, denies alcohol use, denies any illicit drug use, patient has not been sexually active in the last 6 months but does report men as his sexual preference. Patient does not see any other reason. He has had a bad tooth that his father said is cracked and he has not seen a dentist yet. The only medications he takes are vitamins. Past Medical History:    No past medical history on file. Past Surgical History:    No past surgical history on file. Current Medications:     sodium chloride flush  5-40 mL Intravenous 2 times per day    levETIRAcetam  500 mg Oral BID    dexamethasone  4 mg Oral Q6H    [START ON 7/31/2021] pantoprazole  40 mg Oral QAM AC       Allergies:  Patient has no known allergies. Social History:    TOBACCO:    Vapes  tobacco  ETOH:    Reports no alcohol use  DRUGS:   Reports no drug use  MARITAL STATUS:   Single  OCCUPATION:   Unemployed    Patient lives lives with his father    Family History:   No immunodeficiency    REVIEW OF SYSTEMS:    No fever / chills / sweats. No weight loss. No visual change, eye pain, eye discharge. No oral lesion, sore throat, dysphagia. Denies cough / sputum. Denies chest pain, palpitations. Denies n / v / abd pain. No diarrhea. Denies dysuria or change in urinary function. Denies joint swelling or pain. No myalgia, arthralgia.   Denies skin changes, itching  Denies focal weakness, sensory change or other neurologic symptom    Denies new / worse depression, psychiatric symptoms    PHYSICAL EXAM:      Vitals:    /70   Pulse 68   Temp 97.5 °F (36.4 °C) (Oral) Resp 16   Wt 182 lb 8.7 oz (82.8 kg)   SpO2 96%     GENERAL: No apparent distress. RA  HEENT: Membranes moist, no oral lesion, PERRL  NECK:  Supple, no lymphadenopathy  LUNGS: Clear b/l, no rales, no dullness  CARDIAC: RRR, no murmur appreciated  ABD:  + BS, soft / NT  EXT:  No rash, no edema, no lesions  NEURO: No focal neurologic findings  PSYCH: Orientation, sensorium, mood normal  LINES:  Peripheral iv    DATA:    Lab Results   Component Value Date    WBC 8.6 2021    HGB 15.4 2021    HCT 43.9 2021    MCV 92.2 2021     2021     Lab Results   Component Value Date    CREATININE 0.8 (L) 2021    BUN 13 2021     2021    K 4.3 2021     2021    CO2 24 2021       Hepatic Function Panel:   Lab Results   Component Value Date    ALKPHOS 64 2021    ALT 62 2021    AST 25 2021    PROT 6.9 2021    BILITOT 0.6 2021    LABALBU 4.4 2021 ESR 4, CRP <3.0    Micro:  --    Imagin/30 Chest / Abd / Pelvic CT  CHEST:   1.  No evidence of metastatic disease in the chest.   2.  No acute intrathoracic abnormality.       ABDOMEN/PELVIS:   1.  No evidence of metastatic disease in the abdomen and pelvis. 2.  No acute intra-abdominopelvic abnormality.  MRI BRIAIN W WO CONTRAST  1. Bilateral cerebral intra-axial thin ring-enhancing cystic lesions with surrounding vasogenic edema (dominant lesions with incomplete ring enhancement at the cortical gray matter margin) and no internal diffusion restriction. The thin well-defined ring of enhancement and no internal diffusion restriction raises concern for nonpyrogenic abscesses, such as fungal or paracytic infection. Cystic metastases are a possibility, but considered less likely given the thin well-defined ring enhancement.    Incomplete ring of enhancement along the cortical gray matter aspect of the lesions raises the thought of demyelinating disease, but this is unlikely given the hypointense signal on DWI images and the morphology of well-defined thin ring enhancement. 2. Stable mild right subfalcine herniation and uncal herniation     7/29 CT HEAD WO CONTRAST  1. At least 5 cystic masses in the bilateral cerebral hemispheres demonstrate  minimal thin peripheral enhancement.  Surrounding vasogenic edema and mass  effect with right to left midline shift measuring 7 mm and diffuse sulcal  effacement throughout the bilateral cerebral hemispheres.  Crowding of the  right quadrigeminal and ambient cisterns.  These masses may represent  intracranial metastatic disease.  Additional considerations include  neurocysticercosis and cerebral abscesses in the appropriate clinical  setting.  Tumefactive demyelination is considered less likely but difficult  to entirely exclude.  Recommend follow-up brain MRI with and without  intravenous contrast for further evaluation.  Additionally, follow-up CT  chest, abdomen and pelvis may be helpful. CTA HEAD NECK W CONTRAST  2. No acute abnormality in the large arteries of the head and neck.  No focal  hemodynamically significant stenosis or occlusion. IMPRESSION:      Patient Active Problem List   Diagnosis    Brain mass       Mitch Boeck is a 46 y.o. male with no PMH who presents to the emergency department brought in by his father with c/o AMS and speech difficulty. For the last 1.5 moths  Possible brain abscess vs metastasis  Pathogens include  neurocysticercosis  -toxoplasmosis  -nocardia    RECOMMENDATIONS:  -patient is getting brain biopsy with neurosurgery this evening  -await cultures / path    Discussed with Dr. Baldemar Hamilton MD     Addendum to Resident Consult note:  Pt seen, examined and evaluated. I have reviewed the current history, physical findings, labs and assessment and plan and agree with note as documented by resident (Dr. Stevenson Leslie).     45 yo man with no known PMH.  + cig use     Pt reports 1-2 mo difficulty with speech, unable to find words, slurred speech. He has had blurred vision, bilateral.    Less awake / alert over 1-2 days  He developed inability to hold a cup / plate with his R hand on 7/29 and then presented to [facility] ED    In ED, afeb. Abnormal CT with mult cystic cerebral lesions  Transferred and admitted to [facility] on 7/30  Had MRI - mult cystic cerebral lesions with edema   Evaluation by Neurosurg, plan for brain biopsy later today    IMP/  CNS lesion, concern infection though not typical for bacterial ella abscesse   No hx or indication of immunocompromised    No hx or known primary cancer (chest / abd / pelvic CT neg)    REC/  Wound not start any empiric antimicrobial agents at this time  Agree with obtaining HIV test, CD4 count   Obtain immunoglobins  Agree with plan for neurosurgical biopsy for culture and path    Medical Decision Making: The following items were considered in medical decision making:  Discussion of patient care with other providers  Reviewed clinical lab tests  Reviewed radiology tests  Reviewed other diagnostic tests/interventions  Independent review of radiologic images - reviewed with Radiologist   Microbiology cultures and other micro tests reviewed      Risk of Complications/Morbidity: High   Illness(es)/ Infection present that pose threat to bodily function. There is potential for severe exacerbation of infection/side effects of treatment.   Therapy requires intensive monitoring for antimicrobial agent toxicity    Discussed with pt, Medical Resident, Attending - Dr Belkis Shane, Radiologist  Corinne Ceja MD

## 2021-07-30 NOTE — PLAN OF CARE
Problem: Falls - Risk of:  Goal: Will remain free from falls  Description: Will remain free from falls  Outcome: Ongoing   Patient remains free from falls during this shift. Patient is up x1 person assist with a gait belt. Bed is in the lowest position and the bed alarm is activated. Anti-slip socks are on. Call light is within reach. Will continue to monitor and reassess. Problem: Verbal Communication - Impaired:  Goal: Functional communication will improve  Description: Functional communication will improve  Outcome: Ongoing   Patient continues to have aphasia and dysarthria. Will continue to monitor and reassess.

## 2021-07-30 NOTE — ED PROVIDER NOTES
[address] [facility]      Pt Name: Jade Howard  MRN: [id number]  [place] 1980  Date of evaluation: 7/29/2021  Provider: Abimael Ge MD    CHIEF COMPLAINT       Chief Complaint   Patient presents with    Aphasia     pt here with father with c/o slurred speech and trouble with his vison x \"a few weeks\". pt father states pt was dropping things at dinner tonight that made them come in. HISTORY OF PRESENT ILLNESS   (Location/Symptom, Timing/Onset,Context/Setting, Quality, Duration, Modifying Factors, Severity)  Note limiting factors. Jade Howard is a 46 y.o. male who presents to the emergency department brought in by his father with c/o AMS and speech difficulty. Onset gradual, over the past 1.5 months, his father has noticed a progressive difficulty in speech, particularly finding the right words and occasionally slurring his words. Associated with intermittent visual changes and was dropping things at dinner tonight prompting the ED evaluation. Last reported normal was approximately 1.5 months ago. Also pt having intermittent headaches. Pt is disoriented and while attempting to participate in his history is unsure why he is here for evaluation, states he currently feels fine. +ve Family history of cancer. Symptoms not otherwise alleviated or exacerbated by other factors. NursingNotes were reviewed. REVIEW OF SYSTEMS    (2-9 systems for level 4, 10 or more for level 5)       Constitutional: No fever or chills. Eye: No visual disturbances. No eye pain. Ear/Nose/Mouth/Throat: No nasal congestion. No sore throat. Respiratory: No cough, No shortness of breath, No sputum production. Cardiovascular: No chest pain. No palpitations. Gastrointestinal: No abdominal pain. No nausea or vomiting  Genitourinary: No dysuria. No hematuria. Hematology/Lymphatics: No bleeding or bruising tendency. Immunologic: No malaise.  No swollen glands. Musculoskeletal: No back pain. No joint pain. Integumentary: No rash. No abrasions. Neurologic: + headache. + speech change. No focal numbness or weakness. PAST MEDICAL HISTORY   History reviewed. No pertinent past medical history. SURGICALHISTORY     History reviewed. No pertinent surgical history. CURRENT MEDICATIONS       Denies. ALLERGIES     Patient has no known allergies. FAMILY HISTORY     Mother - history of lung cancer with brain metastasis. SOCIAL HISTORY       Social History     Socioeconomic History    Marital status: Single     Spouse name: None    Number of children: None    Years of education: None    Highest education level: None   Occupational History    None   Tobacco Use    Smoking status: Current Every Day Smoker     Types: E-Cigarettes    Smokeless tobacco: Never Used   Substance and Sexual Activity    Alcohol use: Not Currently    Drug use: Never    Sexual activity: None   Other Topics Concern    None   Social History Narrative    None     Social Determinants of Health     Financial Resource Strain:     Difficulty of Paying Living Expenses:    Food Insecurity:     Worried About Running Out of Food in the Last Year:     Ran Out of Food in the Last Year:    Transportation Needs:     Lack of Transportation (Medical):      Lack of Transportation (Non-Medical):    Physical Activity:     Days of Exercise per Week:     Minutes of Exercise per Session:    Stress:     Feeling of Stress :    Social Connections:     Frequency of Communication with Friends and Family:     Frequency of Social Gatherings with Friends and Family:     Attends Voodoo Services:     Active Member of Clubs or Organizations:     Attends Club or Organization Meetings:     Marital Status:    Intimate Partner Violence:     Fear of Current or Ex-Partner:     Emotionally Abused:     Physically Abused:     Sexually Abused:        SCREENINGS   NIH Stroke Scale  Level of Consciousness (1a. ): Alert  LOC Questions (1b. ): Answers neither question correctly  LOC Commands (1c. ): Performs both tasks correctly  Best Gaze (2. ): Normal  Visual (3. ): No visual loss (Pt could see hand, couldn't say how many fingers)  Facial Palsy (4. ): Normal symmetrical movement  Motor Arm, Left (5a. ): No drift  Motor Arm, Right (5b. ): No drift  Motor Leg, Left (6a. ): No drift  Motor Leg, Right (6b. ): No drift  Limb Ataxia (7. ): Absent  Sensory (8. ): (!) Mild to Moderate  Best Language (9. ): Severe aphasia  Dysarthria (10. ): Mild to moderate, slurs some words  Extinction and Inattention (11): No abnormality  Total: 6         PHYSICAL EXAM    (up to 7 for level 4, 8 or more for level 5)     ED Triage Vitals [07/29/21 2001]   BP Temp Temp src Pulse Resp SpO2 Height Weight   128/86 97.7 °F (36.5 °C) -- 108 15 95 % -- --       General: Alert and oriented to self only. No acute distress. Eye: Normal conjunctiva. Pupils equal and reactive. EOMI, visual acuity grossly intact. HENT: Oral mucosa is moist.  Normocephalic, atraumatic. Respiratory: Respirations even and non-labored. Lungs CTAB. Cardiovascular: Normal rate, Regular rhythm. Intact peripheral pulses. Gastrointestinal: Soft, Non-tender, Non-distended. : testicles with normal AP lie, no palpable masses. No tenderness. Musculoskeletal: No swelling. Integumentary: Warm, Dry. Neurologic: Alert and oriented to self only. Cranial nerves II through XII intact. Strength 5-5 throughout. No drift. Sensation intact grossly throughout. Positive word finding difficulty, mild elements of expressive and receptive aphasia. Dysarthria. No ataxia, no dysmetria. Steady gait. Psychiatric: Cooperative. Flat affect.     DIAGNOSTIC RESULTS     EKG: All EKG's are interpreted by the Emergency Department Physician who either signs or Co-signsthis chart in the absence of a cardiologist.    The Ekg interpreted by me shows  normal sinus rhythm with a rate of 79 bpm.  Axis is   Normal  QTc is  normal  Intervals and Durations are unremarkable. ST Segments: normal  No prior EKG available for comparison. RADIOLOGY:   Non-plain filmimages such as CT, Ultrasound and MRI are read by the radiologist. Plain radiographic images are visualized and preliminarily interpreted by the emergency physician with the below findings:      Interpretation per the Radiologist below, if available at the time ofthis note:    XR CHEST (2 VW)   Final Result   No acute process by radiograph. CTA HEAD NECK W CONTRAST   Final Result   1. At least 5 cystic masses in the bilateral cerebral hemispheres demonstrate   minimal thin peripheral enhancement. Surrounding vasogenic edema and mass   effect with right to left midline shift measuring 7 mm and diffuse sulcal   effacement throughout the bilateral cerebral hemispheres. Crowding of the   right quadrigeminal and ambient cisterns. These masses may represent   intracranial metastatic disease. Additional considerations include   neurocysticercosis and cerebral abscesses in the appropriate clinical   setting. Tumefactive demyelination is considered less likely but difficult   to entirely exclude. Recommend follow-up brain MRI with and without   intravenous contrast for further evaluation. Additionally, follow-up CT   chest, abdomen and pelvis may be helpful. 2. No acute abnormality in the large arteries of the head and neck. No focal   hemodynamically significant stenosis or occlusion. Critical results were called by Dr. Vazquez Sessions to Dr. Yoon Living in   the ER on 7/29/2021 at 22:37. CT HEAD WO CONTRAST   Final Result   1. At least 5 cystic masses in the bilateral cerebral hemispheres demonstrate   minimal thin peripheral enhancement.   Surrounding vasogenic edema and mass   effect with right to left midline shift measuring 7 mm and diffuse sulcal   effacement throughout the bilateral cerebral hemispheres. Crowding of the   right quadrigeminal and ambient cisterns. These masses may represent   intracranial metastatic disease. Additional considerations include   neurocysticercosis and cerebral abscesses in the appropriate clinical   setting. Tumefactive demyelination is considered less likely but difficult   to entirely exclude. Recommend follow-up brain MRI with and without   intravenous contrast for further evaluation. Additionally, follow-up CT   chest, abdomen and pelvis may be helpful. 2. No acute abnormality in the large arteries of the head and neck. No focal   hemodynamically significant stenosis or occlusion. Critical results were called by Dr. Vazquez Sessions to Dr. Yoon Living in   the ER on 7/29/2021 at 22:37.                LABS:  Labs Reviewed   COMPREHENSIVE METABOLIC PANEL W/ REFLEX TO MG FOR LOW K - Abnormal; Notable for the following components:       Result Value    Sodium 135 (*)     Chloride 97 (*)     Glucose 134 (*)     ALT 66 (*)     All other components within normal limits    Narrative:     Performed at:  [address] [facility] [place] [address]   Phone [phone]   SALICYLATE LEVEL - Abnormal; Notable for the following components:    Salicylate, Serum <3.6 (*)     All other components within normal limits    Narrative:     Performed at:  [address] [facility] [place] [address]   Phone [phone]   ACETAMINOPHEN LEVEL - Abnormal; Notable for the following components:    Acetaminophen Level <5 (*)     All other components within normal limits    Narrative:     Performed at:  [address] [place] [address]   Phone [phone]   CBC WITH AUTO DIFFERENTIAL    Narrative:     Performed at:  [address] [place] [address]   Phone [phone] TROPONIN    Narrative:     Performed at:  [facility]  [address] [place] [address]   Phone [phone]   URINE RT REFLEX TO CULTURE    Narrative:     Performed at:  [facility]  [address] [place] [address]   Phone [phone]   ETHANOL    Narrative:     Performed at:  [facility]  [address] [place] [address]   Phone [phone]   URINE DRUG SCREEN    Narrative:     Performed at:  [facility]  [address] [place] [address]   Phone [phone]       All other labs were within normal range or not returned as of this dictation. EMERGENCY DEPARTMENT COURSE and DIFFERENTIAL DIAGNOSIS/MDM:   Vitals:    Vitals:    21 2001 21 2101 21 2345   BP: 128/86 116/70 111/75   Pulse: 108 68 77   Resp: 15 18 18   Temp: 97.7 °F (36.5 °C)     SpO2: 95%  97%         Medical decision makin yo M with fam Hx of cancer (father later reports mom had likely lung Ca with mets to the brain), p/w speech deficits (dysarthria, word finding and word processing difficulty, elements of mild expressive and receptive aphasia) progressive over the last 1-2 months with intermittent headaches. Has been dropping things today, last known well over 1 month ago. Pt had held down a job at a [employer] center prior to this approx 1 year ago. No reports of SOB, wt loss, testicular pain or masses, skin lesions. Found to have a large cystic masses on his CT brain, responsible for his symptoms. Given IV dexamethasone to aid with any associated vasogenic edema and possibly improve/temporize his symptoms, given IV Keppra for seizure prophylaxis. Consulted neurosurgery who agreed with transfer for further evaluation, MRI with and without, potential operative intervention versus prognostication.   Spoke with the hospitalist as well Dr. Iris Ling who accepted transfer to [facility]. Given Tylenol for headache. Remained hemodynamically stable, at his presentation neuro exam on repeat assessment prior to transfer of care. CRITICAL CARE TIME   Total Critical Care time was 40 minutes, excluding separately reportable procedures. There was a high probability of clinically significant/life threatening deterioration in the patient's condition which required my urgent intervention. IV dexamethasone administration, IV Keppra for large brain masses which are likely metastasis, discussion with neurosurgical specialist and neuroradiologist on call regarding the patient's case, obtainment of history from the patient's father. Frequent repeat neurologic assessments. CONSULTS:  IP CONSULT TO NEUROSURGERY    PROCEDURES:  Unless otherwise noted below, none         FINAL IMPRESSION      1. Mass of brain    2.  Speech and language deficits          DISPOSITION/PLAN   DISPOSITION Decision To Transfer 07/29/2021 11:15:42 PM        (Please note that portions of this note were completed with a voice recognition program.Efforts were made to edit the dictations but occasionally words are mis-transcribed.)    Shweta Mcmillan MD (electronically signed)  Attending Emergency Physician          Shweta Mcmillan MD  07/30/21 6800

## 2021-07-30 NOTE — ANESTHESIA PRE PROCEDURE
Department of Anesthesiology  Preprocedure Note       Name:  Farnaz Alcala   Age:  46 y.o.  :  1980                                          MRN:  [id number]         Date:  2021      Surgeon: Nicole Orozco):  Rita Zhou MD    Procedure: Procedure(s):  RIGHT FRONTALTEMPORAL CRANIOTOMY FOR EVACUATION OF COLLECTONS AND DIAGNOSIS: POSSIBLE LEFT PARIETAL CRANIOTOMY FOR EVACUATION OF COLLECTIONS    Medications prior to admission:   Prior to Admission medications    Not on File       Current medications:    Current Facility-Administered Medications   Medication Dose Route Frequency Provider Last Rate Last Admin    sodium chloride flush 0.9 % injection 5-40 mL  5-40 mL Intravenous 2 times per day Maurice Morgna MD   10 mL at 21 0958    sodium chloride flush 0.9 % injection 5-40 mL  5-40 mL Intravenous PRN Maurice Morgan MD        0.9 % sodium chloride infusion  25 mL Intravenous PRN Maurice Morgan MD        ondansetron (ZOFRAN-ODT) disintegrating tablet 4 mg  4 mg Oral Q8H PRN Maurice Morgan MD        Or    ondansetron [facility]) injection 4 mg  4 mg Intravenous Q6H PRN Maurice Morgan MD        polyethylene glycol [facility]) packet 17 g  17 g Oral Daily PRN Maurice Morgan MD        acetaminophen (TYLENOL) tablet 650 mg  650 mg Oral Q6H PRN Maurice Morgan MD   650 mg at 21 1542    Or    acetaminophen (TYLENOL) suppository 650 mg  650 mg Rectal Q6H PRN Maurice Morgan MD        levETIRAcetam (KEPPRA) tablet 500 mg  500 mg Oral BID Maurice Morgan MD   500 mg at 21 0955    dexamethasone (DECADRON) tablet 4 mg  4 mg Oral Q6H Maurice Morgan MD   4 mg at 21 1118    labetalol (NORMODYNE;TRANDATE) injection 10 mg  10 mg Intravenous Q6H PRN MD Melly Ross ON 2021] pantoprazole (PROTONIX) tablet 40 mg  40 mg Oral QAM AC Ok [place], APRN - CNP        fentaNYL (SUBLIMAZE) injection 25 mcg  25 mcg Intravenous Q5 Min PRN Bridget Cadet DO  fentaNYL (SUBLIMAZE) injection 50 mcg  50 mcg Intravenous Q5 Min PRN [place] Heaps, DO        HYDROmorphone (DILAUDID) injection 0.25 mg  0.25 mg Intravenous Q5 Min PRN [place] Heaps, DO        HYDROmorphone (DILAUDID) injection 0.5 mg  0.5 mg Intravenous Q5 Min PRN Bridget Heaps, DO        oxyCODONE-acetaminophen (PERCOCET) 5-325 MG per tablet 1 tablet  1 tablet Oral Once PRN [place] Heaps, DO        ondansetron [facility]) injection 4 mg  4 mg Intravenous Once PRN Bridget Heaps, DO        promethazine (PHENERGAN) injection 6.25 mg  6.25 mg Intramuscular Once PRN Bridget Heaps, DO        labetalol (NORMODYNE;TRANDATE) injection 5 mg  5 mg Intravenous Q10 Min PRN [place] Heaps, DO        hydrALAZINE (APRESOLINE) injection 5 mg  5 mg Intravenous Q10 Min PRN Bridget Heaps, DO        meperidine (DEMEROL) injection 12.5 mg  12.5 mg Intravenous Q5 Min PRN Bridget Heaps, DO         Facility-Administered Medications Ordered in Other Encounters   Medication Dose Route Frequency Provider Last Rate Last Admin    remifentanil (ULTIVA) injection   Intravenous Continuous PRN Edyth Piedad, APRN - CRNA   0.25 mcg/kg/min at 07/30/21 1837    propofol injection   Intravenous PRN Edyth Oneill, APRN - CRNA   50 mg at 07/30/21 1827    propofol injection   Intravenous Continuous PRN Edyth Piedad, APRN - CRNA 99.36 mL/hr at 07/30/21 1836 200 mcg/kg/min at 07/30/21 1836    succinylcholine (ANECTINE) injection   Intravenous PRN Edyth Oneill, APRN - CRNA   100 mg at 07/30/21 1824    fentaNYL (SUBLIMAZE) injection   Intravenous PRN Edyth Piedad, APRN - CRNA   100 mcg at 07/30/21 1850    lactated ringers infusion   Intravenous Continuous PRN Edyth Piedad, APRN - CRNA   New Bag at 07/30/21 1817    phenylephrine (KALEB-SYNEPHRINE) 10 mg in dextrose 5 % 250 mL infusion   Intravenous Continuous PRN Viva Zoey Cintia Ruvalcaba, APRN - CRNA 30 mL/hr at 07/30/21 1901 20 mcg/min at 07/30/21 1901    ceFAZolin (ANCEF) injection   Intravenous PRN Anju Lab, APRN - CRNA   2,000 mg at 07/30/21 1907    dexamethasone (DECADRON) injection   Intravenous PRN Anju Lab, APRN - CRNA   10 mg at 07/30/21 1910    ondansetron (ZOFRAN) injection   Intravenous PRN Anju Lab, APRN - CRNA   4 mg at 07/30/21 1911    levETIRAcetam (KEPPRA) injection   IVPB OP PRN Anju Lab, APRN - CRNA   1,000 mg at 07/30/21 1916       Allergies:  No Known Allergies    Problem List:    Patient Active Problem List   Diagnosis Code    Brain mass G93.89    Somnolence R40.0    Dysphasia R47.02       Past Medical History:  History reviewed. No pertinent past medical history. Past Surgical History:  No past surgical history on file.     Social History:    Social History     Tobacco Use    Smoking status: Current Every Day Smoker     Types: E-Cigarettes    Smokeless tobacco: Never Used   Substance Use Topics    Alcohol use: Not Currently                                Ready to quit: Not Answered  Counseling given: Not Answered      Vital Signs (Current):   Vitals:    07/30/21 0431 07/30/21 0700 07/30/21 1001 07/30/21 1400   BP:  98/60 112/70 104/69   Pulse:  64 68 73   Resp:  17 16 16   Temp:  98.4 °F (36.9 °C) 97.5 °F (36.4 °C) 97.6 °F (36.4 °C)   TempSrc:  Oral Oral Oral   SpO2:  95% 96% 98%   Weight: 182 lb 8.7 oz (82.8 kg)                                                 BP Readings from Last 3 Encounters:   07/30/21 104/69   07/30/21 (!) 99/55   07/30/21 105/63       NPO Status:                                                                                 BMI:   Wt Readings from Last 3 Encounters:   07/30/21 182 lb 8.7 oz (82.8 kg)     There is no height or weight on file to calculate BMI.    CBC:   Lab Results   Component Value Date    WBC 8.6 07/30/2021    RBC 4.76 07/30/2021    HGB 15.4 07/30/2021    HCT 43.9 07/30/2021    MCV 92.2 07/30/2021    RDW 13.2 07/30/2021     07/30/2021       CMP:   Lab Results   Component Value Date     07/30/2021    K 4.3 07/30/2021    K 3.9 07/29/2021     07/30/2021    CO2 24 07/30/2021    BUN 13 07/30/2021    CREATININE 0.8 07/30/2021    GFRAA >60 07/30/2021    AGRATIO 1.8 07/30/2021    LABGLOM >60 07/30/2021    GLUCOSE 132 07/30/2021    PROT 6.9 07/30/2021    CALCIUM 9.2 07/30/2021    BILITOT 0.6 07/30/2021    ALKPHOS 64 07/30/2021    AST 25 07/30/2021    ALT 62 07/30/2021       POC Tests: No results for input(s): POCGLU, POCNA, POCK, POCCL, POCBUN, POCHEMO, POCHCT in the last 72 hours. Coags:   Lab Results   Component Value Date    PROTIME 12.0 07/30/2021    INR 1.06 07/30/2021    APTT 33.0 07/30/2021       HCG (If Applicable): No results found for: PREGTESTUR, PREGSERUM, HCG, HCGQUANT     ABGs: No results found for: PHART, PO2ART, OWD2EOU, VSS5UIP, BEART, Q2JCDUTW     Type & Screen (If Applicable):  No results found for: LABABO, LABRH    Drug/Infectious Status (If Applicable):  No results found for: HIV, HEPCAB    COVID-19 Screening (If Applicable): No results found for: COVID19        Anesthesia Evaluation  Patient summary reviewed and Nursing notes reviewed no history of anesthetic complications:   Airway: Mallampati: I  TM distance: >3 FB   Neck ROM: full  Mouth opening: > = 3 FB Dental: normal exam         Pulmonary:       (-) not a current smoker                           Cardiovascular:Negative CV ROS            Rhythm: regular  Rate: normal                    Neuro/Psych:                ROS comment: Aphasic  Multiple ring enhancing lesions GI/Hepatic/Renal: Neg GI/Hepatic/Renal ROS            Endo/Other: Negative Endo/Other ROS       (-) diabetes mellitus               Abdominal:             Vascular: Other Findings:             Anesthesia Plan      general and TIVA     ASA 3       Induction: intravenous. MIPS: Prophylactic antiemetics administered.   Anesthetic plan and risks discussed with patient. Plan discussed with CRNA.                   Klaudia Wade DO   7/30/2021

## 2021-07-30 NOTE — CARE COORDINATION
Case Management Assessment           Initial Evaluation                Date / Time of Evaluation: 7/30/2021 2:26 PM                 Assessment Completed by: Brooke De La Vega RN     Patient is from home and lives with his parents in a two story home with about 5 steps to enter with a railing. His bedroom is on the 2nd floor but they can set up everything on the 1st floor. He had no services or DME prior. His parents have had surgeries and they have walkers and canes at home. They hope that he will be able to return to home. He did agree to have his father be his primary decision maker if he is not able to do so himself. Patient Name: Farnaz [facility]     YOB: 1980  Diagnosis: Brain mass [G93.89]     Date / Time: 7/30/2021  3:58 AM    Patient Admission Status: Inpatient    If patient is discharged prior to next notation, then this note serves as note for discharge by case management. Current PCP: No primary care provider on file. Clinic Patient: No    Chart Reviewed: Yes  Patient/ Family Interviewed: Yes    Initial assessment completed at bedside with: patient and father    Hospitalization in the last 30 days: No    Emergency Contacts:  Extended Emergency Contact Information  Primary Emergency Contact: andreina paiz  Address: [address]           [address] Phone: [phone]  Mobile Phone: [phone]  Relation: Parent   needed? No    Advance Directives:   Code Status: Full Code    Healthcare Power of : No  Agent: NA  Contact Number: NA    Financial  Payor: Stefanie Bullock / Plan: [employer] Every / Product Type: *No Product type* /     Pre-cert required for SNF: Yes    Pharmacy  No Pharmacies Listed    Potential assistance Purchasing Medications: Potential Assistance Purchasing Medications: No  Does Patient want to participate in local refill/ meds to beds program?: Not Assessed    Meds To Beds General Rules:  1. Can ONLY be done Monday- Friday between 8:30am-5pm  2. Prescription(s) must be in pharmacy by 3pm to be filled same day  3. Copy of patient's insurance/ prescription drug card and patient face sheet must be sent along with the prescription(s)  4. Cost of Rx cannot be added to hospital bill. If financial assistance is needed, please contact unit  or ;  or  CANNOT provide pharmacy voucher for patients co-pays  5. Patients can then  the prescription on their way out of the hospital at discharge, or pharmacy can deliver to the bedside if staff is available. (payment due at time of pick-up or delivery - cash, check, or card accepted)     Able to afford home medications/ co-pay costs: Yes    ADLS  Support Systems: Parent    PT AM-PAC:   /24  OT AM-PAC:   /24    [place]: two story home  Steps: 5 steps    Plans to RETURN to current housing: Yes  Barriers to RETURNING to current housing: none noted      DISCHARGE PLAN:  Disposition: TBD    Transportation PLAN for discharge: TBD     Factors facilitating achievement of predicted outcomes: Family support and Pleasant    Barriers to discharge: to [facility] for craniotomy    Additional Case Management Notes: NA    The Plan for Transition of Care is related to the following treatment goals of Brain mass [G93.89]    The Patient and/or patient representative Ida Amato and his family were provided with a choice of provider and agrees with the discharge plan Not Indicated    Freedom of choice list was provided with basic dialogue that supports the patient's individualized plan of care/goals and shares the quality data associated with the providers.  Not Indicated    Care Transition patient: No    Rose Amezcua RN  [facility]  Case Management Department  Ph: [phone]   Fax: [phone]

## 2021-07-30 NOTE — H&P
Internal Medicine  PGY 1  History & Physical      CC headache and difficulty speaking    History Obtained From:  patient    HISTORY OF PRESENT ILLNESS:  Marlene Doran is a 46 y.o. male with no PMHx who presents with a few weeks of worsening aphasia and headaches. Reports that he has had difficulty finding the right words. He has also had headaches and vision changes. Describes the vision changes as sometimes blurriness and some times partial vision loss. He says the symptoms wax and wane. He takes ibuprofen for the headache but it does not help. Last night he was dropping items at dinner and his father brought him to the [facility] ED. In the ED, CT revealed multiple cystic masses with minimal thin peripheral enhancement, vasogenic edema, and mass effect with 7mm midline shift. He was transferred to [facility] for further management. On arrival, he continued to have expressive aphasia but did not have any current complaints. Denies N/V, dizziness, hearing changes, dysphagia. Denies any hx of recurrent infections or immunocompromise. He is a 20-year smoker (unable to vocalize how much). His mother  from brain cancer believed to be lung cancer mets. Unable to reach father for further hx. Past Medical History:    No past medical history on file. Past Surgical History:    No past surgical history on file. Medications Priorto Admission:    No medications prior to admission. Allergies:  Patient has no known allergies. Social History:   · TOBACCO:   reports that he has been smoking e-cigarettes. He has never used smokeless tobacco.  · ETOH:   reports previous alcohol use. · DRUGS : None  · Patient currently lives at home  · Sexual Hx: patient reports sexually active with men  ·   Family History:       Problem Relation Age of Onset    Cancer Mother         Believed to be lung cancer with brain mets       Review of Systems   Constitutional: Negative for chills and fever.    HENT: Negative for hearing loss and trouble swallowing. Eyes: Positive for visual disturbance. Respiratory: Negative for cough and shortness of breath. Cardiovascular: Negative for chest pain and leg swelling. Gastrointestinal: Negative for abdominal distention, abdominal pain, nausea and vomiting. Genitourinary: Negative for decreased urine volume and difficulty urinating. Allergic/Immunologic: Negative for immunocompromised state. Neurological: Positive for headaches. Negative for dizziness and weakness. Psychiatric/Behavioral: Positive for confusion. ROS: A 10 point review of systems was conducted, significant findings as noted in HPI. Vitals:    07/30/21 0415   BP: 101/67   Pulse: 67   Resp: 16   Temp: 99.2 °F (37.3 °C)   SpO2: 97%     Physical Exam  Constitutional:       Appearance: Normal appearance. HENT:      Head: Normocephalic and atraumatic. Mouth/Throat:      Mouth: Mucous membranes are moist.      Pharynx: Oropharynx is clear. Eyes:      Extraocular Movements: Extraocular movements intact. Conjunctiva/sclera: Conjunctivae normal.      Pupils: Pupils are equal, round, and reactive to light. Cardiovascular:      Rate and Rhythm: Normal rate and regular rhythm. Pulses: Normal pulses. Heart sounds: Normal heart sounds. Pulmonary:      Effort: Pulmonary effort is normal.      Breath sounds: Normal breath sounds. Abdominal:      General: Abdomen is flat. Bowel sounds are normal.   Neurological:      Mental Status: He is alert and oriented to person, place, and time. Cranial Nerves: Cranial nerve deficit present. Sensory: No sensory deficit. Motor: No weakness. Coordination: Coordination normal.      Gait: Gait abnormal.      Comments: Left upper visual field deficit. Mild dysarthria. CN exam otherwise normal. Mildly wide-based gait.  Normal heel-to-shin test          DATA:    Labs:  CBC:   Recent Labs     07/29/21 2048   WBC 7.4   HGB 15.9   HCT 44.3          BMP:   Recent Labs     07/29/21 2048   *   K 3.9   CL 97*   CO2 25   BUN 11   CREATININE 1.0   GLUCOSE 134*     LFT's:   Recent Labs     07/29/21 2048   AST 32   ALT 66*   BILITOT 0.6   ALKPHOS 65     Troponin:   Recent Labs     07/29/21 2048   Adelaida Litten <0.01     BNP:No results for input(s): BNP in the last 72 hours. ABGs: No results for input(s): PHART, XLL0RBD, PO2ART in the last 72 hours. INR: No results for input(s): INR in the last 72 hours. U/A:  Recent Labs     07/29/21 2240 07/29/21 2240 07/29/21 2251   COLORU Straw  --   --    PHUR 7.0   < > 6.0   CLARITYU Clear  --   --    SPECGRAV <=1.005  --   --    LEUKOCYTESUR Negative  --   --    UROBILINOGEN 0.2  --   --    BILIRUBINUR Negative  --   --    BLOODU Negative  --   --    GLUCOSEU Negative  --   --     < > = values in this interval not displayed. MRI brain with and without contrast    (Results Pending)   CT CHEST ABDOMEN PELVIS W WO CONTRAST    (Results Pending)       EKG:  Echo:      ASSESSMENT AND PLAN:  Remi Jenkins is a 46 y.o. male with no PMHx who presents with expressive aphasia and headaches with CT showing multiple intraparenchymal cystic lesions    Intraparenchymal cystic lesions: 5 or greater lesions concerning for mets. Infectious process or HIV-related etiology (e.g. neurocysticercosis, toxo, CNS lymphoma) also possible and need to r/o HIV especially given prior male with male sexual hx  - Brain MRI w/wo  - CT C/A/P w/wo: r/o primary cancer with brain mets  - NSGY consult: appreciate recs  - decadron 4mg q6h  - Keppra 500mg BID  - [employer] HOB  - q4h neurochecks  - SBP<160: PRN labetalol  - HIV rapid & screen  - NPO with sips for meds  - repeat CT head for change in neuro exam      Will discuss with attending physician Dr Shabnam Kaur.     Code Status:Full code  FEN: NPO with sips for meds  PPX: SCDs  DISPO: IP    Kina Tolentino MD  7/30/2021,  5:26 AM

## 2021-07-30 NOTE — ED NOTES
Patient ambulatory, steady gait, to restroom. Urine specimen obtained and sent to lab. PT back in bed, no signs of acute distress noted.      Naomi Broderick RN  07/29/21 8733

## 2021-07-30 NOTE — PROGRESS NOTES
Progress Note    Admit Date: 2021  Day: 1  Diet: Diet NPO Exceptions are: Sips of Water with Meds    CC: headache and aphasia    Interval history:   Pt was seen at bedside. Pt has been stable since admission  He denies any fever, chills, nausea, vomiting  Spoke with father who gave explicit history and updated on pt. Pt seemed confused and did not answer my questions correctly. Still concern with his left visual field deficit. HPI:  Adonay oSusa is a 46 y.o. male with no PMHx who presents with a few weeks of worsening aphasia and headaches. Reports that he has had difficulty finding the right words. He has also had headaches and vision changes. Describes the vision changes as sometimes blurriness and some times partial vision loss. He says the symptoms wax and wane. He takes ibuprofen for the headache but it does not help. Last night he was dropping items at dinner and his father brought him to the [facility] ED. As per father- 1 month aog these problems started with mild facial paralysis, particularly in the upper lip area with visual problem that fluctuated. Concerned with cost he neglected it. His dad helped put constantino for medicade. Symptoms got worse suddenly around the past few days and  had periods of feeling lethargic. Last night having dinner at 1900 the pt felt weak and drop dish of sphaghetti and his cup of water. His dad saw this and immediately brought him to the hospital. In the ED the pt could not remember his name,  etc.  After given the IV steriod, the pt said he knew the answers but couldn't get the answers out and was able to answer the questions correctly. In  his mother had symptoms that involved small projection in the skull and could not find out what it was . One night she had a intense headache and went to ED and found out she had aggressive bone tumors involving ribs pelvis, skull. 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      In the ED, CT revealed multiple cystic masses with minimal thin peripheral enhancement, vasogenic edema, and mass effect with 7mm midline shift. He was transferred to [facility] for further management.     On arrival, he continued to have expressive aphasia but did not have any current complaints. Denies N/V, dizziness, hearing changes, dysphagia. Denies any hx of recurrent infections or immunocompromise. He is a 20-year smoker (unable to vocalize how much). His mother  from brain cancer believed to be lung cancer mets.       Medications:     Scheduled Meds:   sodium chloride flush  5-40 mL Intravenous 2 times per day    levETIRAcetam  500 mg Oral BID    dexamethasone  4 mg Oral Q6H     Continuous Infusions:   sodium chloride       PRN Meds:sodium chloride flush, sodium chloride, ondansetron **OR** ondansetron, polyethylene glycol, acetaminophen **OR** acetaminophen, labetalol    Objective:   Vitals:   T-max:  Patient Vitals for the past 8 hrs:   BP Temp Temp src Pulse Resp SpO2 Weight   21 0700 98/60 98.4 °F (36.9 °C) Oral 64 17 95 % --   21 0431 -- -- -- -- -- -- 182 lb 8.7 oz (82.8 kg)   21 0415 101/67 99.2 °F (37.3 °C) Oral 67 16 97 % --     No intake or output data in the 24 hours ending 21 0818    Review of Systems   All other systems reviewed and are negative. Physical Exam  Constitutional:       Appearance: Normal appearance. HENT:      Head: Normocephalic and atraumatic. Nose: Nose normal.      Mouth/Throat:      Pharynx: Oropharynx is clear. Eyes:      General: Visual field deficit present. Extraocular Movements: Extraocular movements intact. Conjunctiva/sclera: Conjunctivae normal.      Pupils: Pupils are equal, round, and reactive to light. Cardiovascular:      Rate and Rhythm: Normal rate and regular rhythm. Pulses: Normal pulses. Heart sounds: Normal heart sounds. Pulmonary:      Effort: Pulmonary effort is normal.      Breath sounds: Normal breath sounds. Abdominal:      General: Abdomen is flat. Bowel sounds are normal.      Palpations: Abdomen is soft. Skin:     General: Skin is warm. Capillary Refill: Capillary refill takes less than 2 seconds. Neurological:      Mental Status: He is alert. He is disoriented and confused. Cranial Nerves: Cranial nerve deficit and facial asymmetry present. Motor: Weakness present. Gait: Gait abnormal.      Comments: Lower left facial droop. Left visual field deficit on peripherals but able to see directly in front of him (hemianopia). Left upper and lower extremity sensation intact. Motor in the left upper extremity +4 right +5. Lower extremity left +5 right +5. Sensation on face is equal bilaterally. The remaining CN intact. Heel to shin test was normal.          LABS:    CBC:   Recent Labs     07/29/21 2048 07/30/21 0515   WBC 7.4 8.1   HGB 15.9 15.7   HCT 44.3 44.5    306   MCV 89.9 91.7     Renal:    Recent Labs     07/29/21 2048 07/30/21 0515   * 137   K 3.9 4.2   CL 97* 103   CO2 25 23   BUN 11 11   CREATININE 1.0 0.8*   GLUCOSE 134* 144*   CALCIUM 9.3 9.2   MG  --  2.60*   PHOS  --  3.3   ANIONGAP 13 11     Hepatic:   Recent Labs     07/29/21 2048 07/30/21 0515   AST 32  --    ALT 66*  --    BILITOT 0.6  --    PROT 7.1  --    LABALBU 4.5 4.2   ALKPHOS 65  --      Troponin:   Recent Labs     07/29/21 2048   TROPONINI <0.01     BNP: No results for input(s): BNP in the last 72 hours. Lipids: No results for input(s): CHOL, HDL in the last 72 hours. Invalid input(s): LDLCALCU, TRIGLYCERIDE  ABGs:  No results for input(s): PHART, JOQ3NEF, PO2ART, NNX8TGG, BEART, THGBART, M9RMVHZO, EPG6DXC in the last 72 hours. INR: No results for input(s): INR in the last 72 hours. Lactate: No results for input(s): LACTATE in the last 72 hours.   Cultures:  -----------------------------------------------------------------  RAD:   CT CHEST ABDOMEN PELVIS W WO CONTRAST    (Results Pending)   MRI BRAIN W WO CONTRAST    (Results Pending)       Assessment/Plan:   Charles Russo is a 46 y.o. male with no PMHx who presents with expressive aphasia and headaches with CT showing multiple intraparenchymal cystic lesions     Intraparenchymal cystic lesions  5 or greater lesions concerning for mets. Infectious process or HIV-related etiology (e.g. neurocysticercosis, toxo, CNS lymphoma) also possible and need to r/o HIV especially given prior male with male sexual hx. Mother had history of lung cancer that mets to the bones and skulls.   - Brain MRI w/wo- pending results   - CT C/A/P w/wo: r/o primary cancer with brain mets  - NSGY consult: appreciate recs  - decadron 4mg q6h  - Keppra 500mg BID  - Elevate HOB  - q4h neurochecks  - SBP<160: PRN labetalol  - HIV rapid & screen  - NPO with sips for meds  - repeat CT head for change in neuro exam       Code Status:Full code  FEN: NPO with sips for meds  PPX: SCDs  DISPO: Garth Major MD, PGY-1  07/30/21  8:18 AM    This patient has been staffed and discussed with Josr Daugherty MD.

## 2021-07-30 NOTE — CONSULTS
NEUROSURGERY CONSULT NOTE    Barrie Montelongo  [id number]   1980   2021    Requesting physician: Farhan Samson MD    Reason for consultation:cystic lesions brain    History of present illness: Patient is a 46 y.o. male with no PMHx who presents with a few weeks of worsening aphasia and headaches. Reports that he has had difficulty finding the right words. He has also had headaches and vision changes. Describes the vision changes as sometimes blurriness and some times partial vision loss. He says the symptoms wax and wane. He takes ibuprofen for the headache but it does not help. Last night he was dropping items at dinner and his father brought him to the [facility] ED.      As per father- 1 month aog these problems started with mild facial paralysis, particularly in the upper lip area with visual problem that fluctuated. Concerned with cost he neglected it. His dad helped put constantino for medicade. Symptoms got worse suddenly around the past few days and  had periods of feeling lethargic. Last night having dinner at 1900 the pt felt weak and drop dish of sphaghetti and his cup of water. His dad saw this and immediately brought him to the hospital. In the ED the pt could not remember his name,  etc.  After given the IV steriod, the pt said he knew the answers but couldn't get the answers out and was able to answer the questions correctly. In  his mother had symptoms that involved small projection in the skull and could not find out what it was . One night she had a intense headache and went to ED and found out she had aggressive bone tumors involving ribs pelvis, skull. 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      ROS:   GENERAL:  Denies fever or recent illness.  Denies weight changes   EYES:  + vision change or diplopia  EARS:  Denies hearing loss  CARDIAC:  Denies chest pain  RESPIRATORY:  Denies shortness of breath  SKIN:  Denies rash or lesions   HEM:  Denies excessive MD   4 mg at 07/30/21 0520    labetalol (NORMODYNE;TRANDATE) injection 10 mg  10 mg Intravenous Q6H PRN Wing Blake MD            Objective:  /70   Pulse 68   Temp 97.5 °F (36.4 °C) (Oral)   Resp 16   Wt 182 lb 8.7 oz (82.8 kg)   SpO2 96%     Physical Exam:   Patient seen and examined  GCS:  4 - Opens eyes on own  4 - Seems confused, disoriented  6 - Follows simple motor commands  General: Well developed. Alert and cooperative in no acute distress. HENT: atraumatic, neck supple  Eyes: Optic discs: Not tested  Pulmonary: unlabored respiratory effort  Cardiovascular:  Warm well perfused. No peripheral edema  Gastrointestinal: abdomen soft, NT, ND    Neurological:  Mental Status: Awake, alert, aphasia  Attention: Intact  Language: expressive aphasia   Sensation: Intact to all extremities to light touch  Coordination: Intact      Cranial Nerves:  II: Visual acuity not tested, vision loss CORTES quad  III, IV, VI: PERRL, 3 mm bilaterally, EOMI, no nystagmus noted  V: Facial sensation intact bilaterally to touch  VII: Face symmetric  VIII: Hearing intact bilaterally to spoken voice  IX: Palate movement equal bilaterally  XI: Shoulder shrug equal bilaterally  XII: Tongue midline    Musculoskeletal:   Gait: Not tested   Assist devices: None   Tone:normal5   Motor strength:    Right  Left    Right  Left    Deltoid  5 5  Hip Flex  5 5   Biceps  5 5  Knee Extensors  5 5   Triceps  5 5  Knee Flexors  5 5   Wrist Ext  5 5  Ankle Dorsiflex. 5 5   Wrist Flex  5 5  Ankle Plantarflex. 5 5   Handgrip  5 5  Ext Jus Longus  5 5   Thumb Ext  5 5         Radiological Findings:  1.  At least 5 cystic masses in the bilateral cerebral hemispheres demonstrate   minimal thin peripheral enhancement.  Surrounding vasogenic edema and mass   effect with right to left midline shift measuring 7 mm and diffuse sulcal   effacement throughout the bilateral cerebral hemispheres.  Crowding of the   right quadrigeminal and ambient cisterns.  These masses may represent   intracranial metastatic disease.  Additional considerations include   neurocysticercosis and cerebral abscesses in the appropriate clinical   setting.  Tumefactive demyelination is considered less likely but difficult   to entirely exclude.  Recommend follow-up brain MRI with and without   intravenous contrast for further evaluation.  Additionally, follow-up CT   chest, abdomen and pelvis may be helpful. 2. No acute abnormality in the large arteries of the head and neck.  No focal   hemodynamically significant stenosis or occlusion. Labs:  Recent Labs     07/30/21  0515   WBC 8.1   HGB 15.7   HCT 44.5          Recent Labs     07/30/21  0515      K 4.2      CO2 23   BUN 11   CREATININE 0.8*   GLUCOSE 144*   CALCIUM 9.2   PHOS 3.3   MG 2.60*       No results for input(s): PROTIME, INR, APTT in the last 72 hours. Patient Active Problem List    Diagnosis Date Noted    Brain mass 07/29/2021       Assessment:  Patient is a 46 y.o. 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 Plan:  1. Probable crani on right to decompress cystic lesions tonight or tomorrow  2. Neurologic exams frequency: Floor: Q4H  3. For change in exam MUST contact neurosurgery team along with critical care or primary team  4. Brain Mass:  - MRI Brain w wo to assist with establish likely diagnosis  5. Cerebral edema:  - Keep Na within normal limits  - Decadron 4 mg Q6H  - Keep HOB >30 degrees  - NO dextrose in IVF's or in IV drips  6. Seizure prophylaxis: Keppra 500mg BID  7. GI Prophylaxis: Pepcid  8. DVT Prophylaxis: SCD's  9. Pain: Managed by medical team  10. Speech consulted for swallow eval, appreciate recs  11. NPO  12. Thank you for consult. Will follow inpatient. Please call with any questions or decline in neurological status    DISPO: Remain inpatient from neurosurgery standpoint.   Patient was seen and examined with Dr. Tremaine Nguyen who agrees with above assessment and plan. Electronically signed by:  NITIN Almazan - LUKE, NITIN-CNP, 7/30/2021 10:24 AM  [phone]

## 2021-07-30 NOTE — PROGRESS NOTES
Patient admitted to room 5501. Patient is oriented to self, place, and situation. Vital signs are stable. Bed is in the lowest position. Bed alarm is activated. Call light is within reach. Camera is on in room. Will continue to monitor and reassess.

## 2021-07-30 NOTE — PLAN OF CARE
47 yo male presenting with hx of progressive aphasia over several months  AVSS  A/A/O x 2  Severe expressive aphasia  No motor deficit per ED MD  CT Head- Multiple intracranial cystic lesions c/w metastatic disese vs abscess vs cystocercosis.   Agree with transfer to [facility] recommend keppra, decadron, SBP , 160 mmHg, MRI w/ and w/o contrast tomorrow AM.

## 2021-07-30 NOTE — ED NOTES
Report given to Gerhard Cordova. Vidales fall, vitals, pain, and medications discussed. All questions answered, RN verbalized understanding. Pt and family updated on plan of care. [facility] ETA 0230. Bed 5501.      Michelle Fisher RN  07/30/21 0120

## 2021-07-31 ENCOUNTER — APPOINTMENT (OUTPATIENT)
Dept: CT IMAGING | Age: 41
DRG: 021 | End: 2021-07-31
Attending: INTERNAL MEDICINE
Payer: COMMERCIAL

## 2021-07-31 ENCOUNTER — APPOINTMENT (OUTPATIENT)
Dept: MRI IMAGING | Age: 41
DRG: 021 | End: 2021-07-31
Attending: INTERNAL MEDICINE
Payer: COMMERCIAL

## 2021-07-31 ENCOUNTER — APPOINTMENT (OUTPATIENT)
Dept: GENERAL RADIOLOGY | Age: 41
DRG: 021 | End: 2021-07-31
Attending: INTERNAL MEDICINE
Payer: COMMERCIAL

## 2021-07-31 PROBLEM — G93.0 BRAIN CYST: Status: ACTIVE | Noted: 2021-07-29

## 2021-07-31 LAB
ALBUMIN SERPL-MCNC: 3.9 G/DL (ref 3.4–5)
ANION GAP SERPL CALCULATED.3IONS-SCNC: 10 MMOL/L (ref 3–16)
APPEARANCE FLUID: CLEAR
BUN BLDV-MCNC: 14 MG/DL (ref 7–20)
CALCIUM SERPL-MCNC: 8.7 MG/DL (ref 8.3–10.6)
CELL COUNT FLUID TYPE: NORMAL
CHLORIDE BLD-SCNC: 106 MMOL/L (ref 99–110)
CLOT EVALUATION: NORMAL
CO2: 23 MMOL/L (ref 21–32)
COLOR FLUID: YELLOW
CREAT SERPL-MCNC: 0.8 MG/DL (ref 0.9–1.3)
FLUID PATH CONSULT: YES
GFR AFRICAN AMERICAN: >60
GFR NON-AFRICAN AMERICAN: >60
GLUCOSE BLD-MCNC: 140 MG/DL (ref 70–99)
HCT VFR BLD CALC: 41.9 % (ref 40.5–52.5)
HEMOGLOBIN: 14.5 G/DL (ref 13.5–17.5)
HIV AG/AB: NORMAL
HIV ANTIGEN: NORMAL
HIV-1 ANTIBODY: NORMAL
HIV-2 AB: NORMAL
LYMPHOCYTES, BODY FLUID: 80 %
MAGNESIUM: 2.5 MG/DL (ref 1.8–2.4)
MCH RBC QN AUTO: 32.1 PG (ref 26–34)
MCHC RBC AUTO-ENTMCNC: 34.7 G/DL (ref 31–36)
MCV RBC AUTO: 92.5 FL (ref 80–100)
MONOCYTE, FLUID: 10 %
MONONUCLEAR UNIDENTIFIED CELLS FLUID: 9 %
NEUTROPHIL, FLUID: 1 %
NUCLEATED CELLS FLUID: 51 /CUMM
NUMBER OF CELLS COUNTED FLUID: 86
PDW BLD-RTO: 13 % (ref 12.4–15.4)
PHOSPHORUS: 3.5 MG/DL (ref 2.5–4.9)
PLATELET # BLD: 293 K/UL (ref 135–450)
PMV BLD AUTO: 7.8 FL (ref 5–10.5)
POTASSIUM SERPL-SCNC: 4.1 MMOL/L (ref 3.5–5.1)
RBC # BLD: 4.53 M/UL (ref 4.2–5.9)
RBC FLUID: <1000 /CUMM
SODIUM BLD-SCNC: 139 MMOL/L (ref 136–145)
WBC # BLD: 18.8 K/UL (ref 4–11)

## 2021-07-31 PROCEDURE — 6360000002 HC RX W HCPCS: Performed by: NEUROLOGICAL SURGERY

## 2021-07-31 PROCEDURE — 6360000002 HC RX W HCPCS: Performed by: STUDENT IN AN ORGANIZED HEALTH CARE EDUCATION/TRAINING PROGRAM

## 2021-07-31 PROCEDURE — 2580000003 HC RX 258: Performed by: STUDENT IN AN ORGANIZED HEALTH CARE EDUCATION/TRAINING PROGRAM

## 2021-07-31 PROCEDURE — 92611 MOTION FLUOROSCOPY/SWALLOW: CPT

## 2021-07-31 PROCEDURE — 94150 VITAL CAPACITY TEST: CPT

## 2021-07-31 PROCEDURE — A9576 INJ PROHANCE MULTIPACK: HCPCS | Performed by: NEUROLOGICAL SURGERY

## 2021-07-31 PROCEDURE — 99232 SBSQ HOSP IP/OBS MODERATE 35: CPT | Performed by: INTERNAL MEDICINE

## 2021-07-31 PROCEDURE — 2580000003 HC RX 258: Performed by: NEUROLOGICAL SURGERY

## 2021-07-31 PROCEDURE — 92610 EVALUATE SWALLOWING FUNCTION: CPT

## 2021-07-31 PROCEDURE — 92523 SPEECH SOUND LANG COMPREHEN: CPT

## 2021-07-31 PROCEDURE — 70553 MRI BRAIN STEM W/O & W/DYE: CPT

## 2021-07-31 PROCEDURE — 2000000000 HC ICU R&B

## 2021-07-31 PROCEDURE — 6360000004 HC RX CONTRAST MEDICATION: Performed by: NEUROLOGICAL SURGERY

## 2021-07-31 PROCEDURE — 83735 ASSAY OF MAGNESIUM: CPT

## 2021-07-31 PROCEDURE — 70450 CT HEAD/BRAIN W/O DYE: CPT

## 2021-07-31 PROCEDURE — 6370000000 HC RX 637 (ALT 250 FOR IP): Performed by: NEUROLOGICAL SURGERY

## 2021-07-31 PROCEDURE — 36415 COLL VENOUS BLD VENIPUNCTURE: CPT

## 2021-07-31 PROCEDURE — 80069 RENAL FUNCTION PANEL: CPT

## 2021-07-31 PROCEDURE — 85027 COMPLETE CBC AUTOMATED: CPT

## 2021-07-31 PROCEDURE — 6360000002 HC RX W HCPCS: Performed by: ANESTHESIOLOGY

## 2021-07-31 PROCEDURE — 74230 X-RAY XM SWLNG FUNCJ C+: CPT

## 2021-07-31 RX ORDER — POLYETHYLENE GLYCOL 3350 17 G/17G
17 POWDER, FOR SOLUTION ORAL DAILY
Status: DISCONTINUED | OUTPATIENT
Start: 2021-07-31 | End: 2021-08-04 | Stop reason: HOSPADM

## 2021-07-31 RX ORDER — SENNA AND DOCUSATE SODIUM 50; 8.6 MG/1; MG/1
1 TABLET, FILM COATED ORAL 2 TIMES DAILY
Status: DISCONTINUED | OUTPATIENT
Start: 2021-07-31 | End: 2021-08-04 | Stop reason: HOSPADM

## 2021-07-31 RX ORDER — NALOXONE HYDROCHLORIDE 0.4 MG/ML
0.2 INJECTION, SOLUTION INTRAMUSCULAR; INTRAVENOUS; SUBCUTANEOUS PRN
Status: DISCONTINUED | OUTPATIENT
Start: 2021-07-31 | End: 2021-08-04 | Stop reason: HOSPADM

## 2021-07-31 RX ORDER — MAGNESIUM HYDROXIDE/ALUMINUM HYDROXICE/SIMETHICONE 120; 1200; 1200 MG/30ML; MG/30ML; MG/30ML
15 SUSPENSION ORAL EVERY 6 HOURS PRN
Status: DISCONTINUED | OUTPATIENT
Start: 2021-07-31 | End: 2021-08-04 | Stop reason: HOSPADM

## 2021-07-31 RX ORDER — BISACODYL 10 MG
10 SUPPOSITORY, RECTAL RECTAL DAILY PRN
Status: DISCONTINUED | OUTPATIENT
Start: 2021-07-31 | End: 2021-08-04 | Stop reason: HOSPADM

## 2021-07-31 RX ORDER — PROMETHAZINE HYDROCHLORIDE 25 MG/1
12.5 TABLET ORAL EVERY 6 HOURS PRN
Status: DISCONTINUED | OUTPATIENT
Start: 2021-07-31 | End: 2021-08-04 | Stop reason: HOSPADM

## 2021-07-31 RX ORDER — SODIUM CHLORIDE 9 MG/ML
INJECTION, SOLUTION INTRAVENOUS CONTINUOUS
Status: ACTIVE | OUTPATIENT
Start: 2021-07-31 | End: 2021-07-31

## 2021-07-31 RX ORDER — SODIUM CHLORIDE 9 MG/ML
25 INJECTION, SOLUTION INTRAVENOUS PRN
Status: DISCONTINUED | OUTPATIENT
Start: 2021-07-31 | End: 2021-08-04 | Stop reason: HOSPADM

## 2021-07-31 RX ORDER — OXYCODONE HYDROCHLORIDE 5 MG/1
5 TABLET ORAL EVERY 4 HOURS PRN
Status: DISCONTINUED | OUTPATIENT
Start: 2021-07-31 | End: 2021-08-04 | Stop reason: HOSPADM

## 2021-07-31 RX ORDER — DEXAMETHASONE SODIUM PHOSPHATE 4 MG/ML
4 INJECTION, SOLUTION INTRA-ARTICULAR; INTRALESIONAL; INTRAMUSCULAR; INTRAVENOUS; SOFT TISSUE EVERY 6 HOURS
Status: DISCONTINUED | OUTPATIENT
Start: 2021-07-31 | End: 2021-08-04 | Stop reason: HOSPADM

## 2021-07-31 RX ORDER — FENTANYL CITRATE 50 UG/ML
50 INJECTION, SOLUTION INTRAMUSCULAR; INTRAVENOUS ONCE
Status: COMPLETED | OUTPATIENT
Start: 2021-07-31 | End: 2021-07-31

## 2021-07-31 RX ORDER — SODIUM CHLORIDE 0.9 % (FLUSH) 0.9 %
10 SYRINGE (ML) INJECTION EVERY 12 HOURS SCHEDULED
Status: DISCONTINUED | OUTPATIENT
Start: 2021-07-31 | End: 2021-08-04 | Stop reason: HOSPADM

## 2021-07-31 RX ORDER — ACETAMINOPHEN 325 MG/1
650 TABLET ORAL EVERY 4 HOURS PRN
Status: DISCONTINUED | OUTPATIENT
Start: 2021-07-31 | End: 2021-08-04 | Stop reason: HOSPADM

## 2021-07-31 RX ORDER — OXYCODONE HYDROCHLORIDE 5 MG/1
10 TABLET ORAL EVERY 4 HOURS PRN
Status: DISCONTINUED | OUTPATIENT
Start: 2021-07-31 | End: 2021-08-04 | Stop reason: HOSPADM

## 2021-07-31 RX ORDER — ONDANSETRON 2 MG/ML
4 INJECTION INTRAMUSCULAR; INTRAVENOUS EVERY 6 HOURS PRN
Status: DISCONTINUED | OUTPATIENT
Start: 2021-07-31 | End: 2021-08-04 | Stop reason: HOSPADM

## 2021-07-31 RX ORDER — NALOXONE HYDROCHLORIDE 0.4 MG/ML
0.4 INJECTION, SOLUTION INTRAMUSCULAR; INTRAVENOUS; SUBCUTANEOUS PRN
Status: DISCONTINUED | OUTPATIENT
Start: 2021-07-31 | End: 2021-07-31 | Stop reason: SDUPTHER

## 2021-07-31 RX ORDER — SODIUM CHLORIDE 0.9 % (FLUSH) 0.9 %
10 SYRINGE (ML) INJECTION PRN
Status: DISCONTINUED | OUTPATIENT
Start: 2021-07-31 | End: 2021-08-04 | Stop reason: HOSPADM

## 2021-07-31 RX ADMIN — LEVETIRACETAM 500 MG: 100 INJECTION, SOLUTION INTRAVENOUS at 08:28

## 2021-07-31 RX ADMIN — VANCOMYCIN HYDROCHLORIDE 1000 MG: 10 INJECTION, POWDER, LYOPHILIZED, FOR SOLUTION INTRAVENOUS at 16:59

## 2021-07-31 RX ADMIN — VANCOMYCIN HYDROCHLORIDE 2000 MG: 10 INJECTION, POWDER, LYOPHILIZED, FOR SOLUTION INTRAVENOUS at 10:38

## 2021-07-31 RX ADMIN — Medication 10 ML: at 01:54

## 2021-07-31 RX ADMIN — DEXAMETHASONE SODIUM PHOSPHATE 4 MG: 4 INJECTION, SOLUTION INTRAMUSCULAR; INTRAVENOUS at 05:39

## 2021-07-31 RX ADMIN — ONDANSETRON 4 MG: 2 INJECTION INTRAMUSCULAR; INTRAVENOUS at 01:21

## 2021-07-31 RX ADMIN — LEVETIRACETAM 500 MG: 100 INJECTION, SOLUTION INTRAVENOUS at 20:54

## 2021-07-31 RX ADMIN — DEXAMETHASONE SODIUM PHOSPHATE 4 MG: 4 INJECTION, SOLUTION INTRAMUSCULAR; INTRAVENOUS at 23:50

## 2021-07-31 RX ADMIN — CEFEPIME 2000 MG: 2 INJECTION, POWDER, FOR SOLUTION INTRAMUSCULAR; INTRAVENOUS at 17:00

## 2021-07-31 RX ADMIN — HYDROMORPHONE HYDROCHLORIDE 0.5 MG: 1 INJECTION, SOLUTION INTRAMUSCULAR; INTRAVENOUS; SUBCUTANEOUS at 01:22

## 2021-07-31 RX ADMIN — FENTANYL CITRATE 25 MCG: 50 INJECTION, SOLUTION INTRAMUSCULAR; INTRAVENOUS at 00:02

## 2021-07-31 RX ADMIN — FENTANYL CITRATE 50 MCG: 50 INJECTION, SOLUTION INTRAMUSCULAR; INTRAVENOUS at 02:03

## 2021-07-31 RX ADMIN — DEXAMETHASONE SODIUM PHOSPHATE 4 MG: 4 INJECTION, SOLUTION INTRAMUSCULAR; INTRAVENOUS at 11:11

## 2021-07-31 RX ADMIN — SODIUM CHLORIDE: 9 INJECTION, SOLUTION INTRAVENOUS at 01:53

## 2021-07-31 RX ADMIN — GADOTERIDOL 18 ML: 279.3 INJECTION, SOLUTION INTRAVENOUS at 19:20

## 2021-07-31 RX ADMIN — CEFEPIME 2000 MG: 2 INJECTION, POWDER, FOR SOLUTION INTRAMUSCULAR; INTRAVENOUS at 08:51

## 2021-07-31 RX ADMIN — Medication 10 ML: at 21:03

## 2021-07-31 RX ADMIN — LEVETIRACETAM 500 MG: 100 INJECTION, SOLUTION INTRAVENOUS at 02:30

## 2021-07-31 RX ADMIN — HYDROMORPHONE HYDROCHLORIDE 0.5 MG: 1 INJECTION, SOLUTION INTRAMUSCULAR; INTRAVENOUS; SUBCUTANEOUS at 15:25

## 2021-07-31 RX ADMIN — DEXAMETHASONE SODIUM PHOSPHATE 4 MG: 4 INJECTION, SOLUTION INTRAMUSCULAR; INTRAVENOUS at 18:38

## 2021-07-31 RX ADMIN — DOCUSATE SODIUM 50 MG AND SENNOSIDES 8.6 MG 1 TABLET: 8.6; 5 TABLET, FILM COATED ORAL at 20:53

## 2021-07-31 RX ADMIN — HYDROMORPHONE HYDROCHLORIDE 1 MG: 1 INJECTION, SOLUTION INTRAMUSCULAR; INTRAVENOUS; SUBCUTANEOUS at 20:54

## 2021-07-31 RX ADMIN — HYDROMORPHONE HYDROCHLORIDE 0.5 MG: 1 INJECTION, SOLUTION INTRAMUSCULAR; INTRAVENOUS; SUBCUTANEOUS at 08:26

## 2021-07-31 RX ADMIN — HYDROMORPHONE HYDROCHLORIDE 0.5 MG: 1 INJECTION, SOLUTION INTRAMUSCULAR; INTRAVENOUS; SUBCUTANEOUS at 04:33

## 2021-07-31 ASSESSMENT — PAIN SCALES - GENERAL
PAINLEVEL_OUTOF10: 6
PAINLEVEL_OUTOF10: 7
PAINLEVEL_OUTOF10: 10
PAINLEVEL_OUTOF10: 10
PAINLEVEL_OUTOF10: 6

## 2021-07-31 ASSESSMENT — ENCOUNTER SYMPTOMS
ABDOMINAL PAIN: 0
RHINORRHEA: 0
CONSTIPATION: 0
ABDOMINAL DISTENTION: 0
CHOKING: 0
SHORTNESS OF BREATH: 0
CHEST TIGHTNESS: 0

## 2021-07-31 ASSESSMENT — PAIN DESCRIPTION - PAIN TYPE
TYPE: SURGICAL PAIN
TYPE: SURGICAL PAIN

## 2021-07-31 ASSESSMENT — PAIN DESCRIPTION - LOCATION: LOCATION: HEAD

## 2021-07-31 ASSESSMENT — PAIN DESCRIPTION - DESCRIPTORS: DESCRIPTORS: OTHER (COMMENT)

## 2021-07-31 ASSESSMENT — PAIN DESCRIPTION - ORIENTATION: ORIENTATION: OTHER (COMMENT)

## 2021-07-31 NOTE — PROCEDURES
INSTRUMENTAL SWALLOW REPORT  MODIFIED BARIUM SWALLOW    NAME: Jaime Cohen   : 1980  MRN: [id number]       Date of Eval: 2021     Ordering Physician: Zay Lacy  Referring Diagnosis: oropharyngeal dysphagia s/p brain mass    Past Medical History:  has no past medical history on file. Past Surgical History:  has no past surgical history on file. Current Diet Solid Consistency: Regular  Current Diet Liquid Consistency: Thin    Date of Prior Study: None     Results of Prior Study: n/a    Patient Complaints/Reason for Referral:  Jaime Cohen was referred for a MBS to assess the efficiency of his swallow function, assess for aspiration, and to make recommendations regarding safe dietary consistencies, effective compensatory strategies, and safe eating environment. Patient complaints: unable to state    Onset of problem:   Date of Onset: 2 wks prior to admission  General Comment  Comments: Pt admitted 21 s/p AMS and speech difficulty - imaging revealed multiple brain lesions and pt to OR for bilateral crani last night. ID consulted d/t concern for infectious process. No significant PMHx noted. MBS recommended for full assessment of swallow given indicators of oral + concern for pharyngeal dysphagia at bedside. Subjective  Subjective: RN at pt side. Pt with delayed and intermittent absent responses. Flat affect and open mouth posturing. Behavior/Cognition/Vision/Hearing:  Behavior/Cognition: Confused; Requires cueing; Cooperative  Vision: Impaired (reduced visual attention, suspect possible R neglect)  Hearing: Within functional limits    Impressions:  Oral Phase: Oral phase marked by reduced labial seal with anterior loss on R with all liquids, reduced bolus control with larger amounts resulting in premature spillage over base of tongue, slowed posterior propulsion, and R buccal residue with all consistencies.  Pt with severely impaired mastication of regular solid, initially just holding bolus on lingual surface with no initiation to masticate. Pt then demonstrated slowed, laborious, and inefficient masticatory pattern with facial grimacing - endorsed pain when questioned. Minimal recollection of solid bolus occurred with piecemeal swallowing and liquid rinse required to fully clear. Pharyngeal: Delayed swallow initiation placing pt at risk of aspiration with larger liquid boluses. Due to posterior spillage over base of tongue + delayed initiation, pt with trace aspiration of thin liquids via cup edge prior to the swallow x 1 and moderately deep penetration of nectar thick liquids via cup edge x 1. Pt with delayed, weak throat clear in response to aspiration in addition to grimacing and pointing to chest. No penetration or aspiration observed with small boluses of liquids (via tsp or cued for small sip via cup and straw) or solid textures. Instance of reduced laryngeal elevation resulted in penetrated material from nectar thick liquids not fully clearing from laryngeal vestibule immediately but eventually cleared with subsequent swallows. Intermittent vallecular residue in between swallows d/t spillover from oral cavity. Adequate pharyngeal stripping wave with no significant pharyngeal residue after the swallow. Upper Esophageal Screen: Pt with delayed belching and hiccuping after completion of procedure. Dysphagia Outcome Severity Scale: Level 3: Moderate dysphagia- Total assisstance, supervision or strategies. Two or more diet consistencies restricted  Penetration-Aspiration Scale (PAS): 7 - Material enters the airway, passes below the vocal folds, and is not ejected from the trachea despite effort    Treatment Dx and ICD 10: oropharyngeal dysphagia R13.12   Patient Position: Lateral and Patient Degrees: 90  Consistencies Administered: Reg solid; Dysphagia Pureed (Dysphagia I); Thin straw; Thin teaspoon; Thin cup;Nectar  teaspoon;Nectar cup  Compensatory Swallowing Strategies Attempted: Upright as possible for all oral intake; Alternate solids and liquids;Small bites/sips; Check for pocketing of food on the Right  Postural Changes and/or Swallow Maneuvers Trialed: Upright 90 degrees    Recommended Diet:  Solid consistency: Dysphagia Pureed (Dysphagia I)  Liquid consistency: Thin (small sips)  Liquid administration via: Straw;Cup;Spoon  Medication administration: PO (may require crushed in puree)    Safe Swallow Protocol:  Supervision: 1:1 (will require d/t cognitive linguistic deficits - cues for small sips of liquids)  Compensatory Swallowing Strategies: Upright as possible for all oral intake; Check for pocketing of food on the Right; Alternate solids and liquids; Remain upright for 30-45 minutes after meals;Small bites/sips      Recommendations/Treatment  Requires SLP Intervention: Yes  D/C Recommendations:  (ongoing ST)  Postural Changes and/or Swallow Maneuvers: Upright 90 degrees      Recommended Exercises:    Therapeutic Interventions: Bolus control exercises;Oral motor exercises; Patient/Family education; Therapeutic PO trials with SLP; Diet tolerance monitoring;Pharyngeal exercises      Education: Images and recommendations were reviewed with pt and RN following this exam.   Patient Education: Educated pt to MBS procedure, results, and recommendations  Patient Education Response: No evidence of learning    Prognosis  Prognosis for safe diet advancement: good  Barriers to reach goals: language deficits  Barriers/Prognosis Comment: pain  Duration/Frequency of Treatment  Duration/Frequency of Treatment: 3-5x/wk for LOS  Safety Devices  Safety Devices in place: Not Applicable (pt in transport stretcher with RN at side)  Type of devices: All fall risk precautions in place      Goals:    Goal 1: Pt will consume PO with <3 instances of anterior loss on R x 2 sessions. Goal 2: Pt will participate in MBS procedure.   7/31 (s/p MBS): GOAL MET    New goals:  Goal 3: Pt will consume recommended diet consistency w/o respiratory decline or overt s/s associated with aspiration. Goal 4: Pt will consume soft solids with adequate mastication and min oral residue x 2 sessions. Oral Preparation / Oral Phase  Oral Phase: Impaired  Oral Phase - Major Contributing Deficits  Right Anterior Bolus Loss: Thin teaspoon; Thin cup;Nectar cup;Nectar teaspoon  Poor Mastication: Reg solid  Weak Lingual Manipulation: Reg solid  Reduced Posterior Propulsion: All  Holding Of Bolus: Reg solid  Right Pocketing in Lateral Sulci: All  Decreased Bolus Cohesion: Reg solid  Piecemeal Swallowing: Reg solid; Thin teaspoon  Premature Bolus Loss to Pharynx: Nectar cup; Thin cup        Pharyngeal Phase  Pharyngeal Phase: Impaired  Pharyngeal Phase - Major Contributing Deficits  Delayed Swallow Initiation: All  Premature Spillage to Pyriform: Nectar cup; Thin cup  Reduced Laryngeal Elevation: Nectar cup  Shallow Penetration Before: Nectar cup  Shallow Penetration During: Nectar cup  Parital Self-clearing (shallow): Nectar cup  Aspiration Before: Thin cup  Trace Aspiration: Thin cup  Delayed Cough Reflex: Thin cup  Weak Cough Reflex: Thin cup  No Bolus Expelled: Thin cup          Pain   Appeared comfortable; endorsed discomfort with mastication - RN aware      Therapy Time:   Individual Concurrent Group Co-treatment   Time In 1015 0000 0000 0000   Time Out 1030 0000 0000 0000   Minutes 15 0 0 0   Variance: 0  Timed Code Treatment Minutes: 0 Minutes  Total Treatment Time: [address] [place], [place] CCC-SLP; [id number]  Speech-Language Pathologist  Pager # [phone]  Phone # [phone]  Office # [phone]    If patient is discharged prior to next session, this note will serve as discharge summary.

## 2021-07-31 NOTE — PROGRESS NOTES
Neurosurgery Progress Note    2021 8:12 AM                               Eulah Marrow                      LOS: 1 day              Stereotactic right frontotemporal craniotomy for evacuation of temporal and parietal cystic lesions  2. Stereotactic left frontotemporal craniotomy for evacuation of frontal cystic lesion preformed late last night   Subjective:  Pt stable overnight. Physical Exam:    Temp (24hrs), Av.8 °F (35.4 °C), Min:87.1 °F (30.6 °C), Max:98.5 °F (36.9 °C)      Neuro Exam  No acute  distress  Dressing clean and dry  Pt is able to follow commands. When asked if he is experiencing pain  Pt able to state  \"No, I don't think so\" Pt unable to give name, location or date due to expressive aphasia . Pt moves all extremities when asked    Labs:  Recent Labs     21  0445   WBC 18.8*   HGB 14.5   HCT 41.9          Recent Labs     21  0445      K 4.1      CO2 23   BUN 14   CREATININE 0.8*   GLUCOSE 140*   CALCIUM 8.7   PHOS 3.5   MG 2.50*       Recent Labs     21  1040   PROTIME 12.0   INR 1.06   APTT 33.0         Assessment and Plan:    Patient is a 46 y.o. 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 Pt underwent craniotomy for drainage late last night      Plan:  1. Continue current neuro exams   2. For change in exam MUST contact neurosurgery team along with critical care or primary team  5. Cerebral edema:  - Keep Na within normal limits  - Decadron 4 mg Q6H  - Keep HOB >30 degrees  - NO dextrose in IVF's or in IV drips  6. Seizure prophylaxis: Keppra 500mg BID  7. GI Prophylaxis: Pepcid  8. DVT Prophylaxis: SCD's  9. Pain: Managed by medical team   10. Speech consulted for swallow eval, appreciate recs   11. ID consulted for concern of infectious process    Disp- Remain in  the ICU     Thank you for consult.  Will follow inpatient.  Please call with any questions or decline in neurological status    Josias Aguilar PA-C

## 2021-07-31 NOTE — PROGRESS NOTES
Hospitalist Progress Note      PCP: No primary care provider on file. Date of Admission: 7/30/2021    Chief Complaint: Headache and difficulty speaking    Hospital Course: Patient admitted for multiple intraparenchymal cystic lesions. Status post craniotomy for drainage 07/30. Continue Keppra and Decadron. Subjective: Patient with difficulty speaking but understand and follow commands. Denies any complaints at the moment. Medications:  Reviewed    Infusion Medications    sodium chloride      sodium chloride 75 mL/hr at 07/31/21 0153     Scheduled Medications    sodium chloride flush  10 mL Intravenous 2 times per day    polyethylene glycol  17 g Oral Daily    sennosides-docusate sodium  1 tablet Oral BID    dexamethasone  4 mg Intravenous Q6H    levetiracetam  500 mg Intravenous BID    vancomycin  2,000 mg Intravenous Once    Followed by   Boss vancomycin  1,000 mg Intravenous Q8H    cefepime  2,000 mg Intravenous Q8H    pantoprazole  40 mg Oral QAM AC     PRN Meds: sodium chloride flush, sodium chloride, acetaminophen, oxyCODONE **OR** oxyCODONE, HYDROmorphone **OR** HYDROmorphone, naloxone, promethazine **OR** ondansetron, aluminum & magnesium hydroxide-simethicone, bisacodyl, labetalol      Intake/Output Summary (Last 24 hours) at 7/31/2021 0848  Last data filed at 7/31/2021 4631  Gross per 24 hour   Intake 1649 ml   Output 2400 ml   Net -751 ml       Physical Exam Performed:    /89   Pulse 69   Temp 98.5 °F (36.9 °C)   Resp 13   Ht 5' 10\" (1.778 m)   Wt 182 lb 8.7 oz (82.8 kg)   SpO2 98%   BMI 26.19 kg/m²     General appearance: No apparent distress, appears stated age and cooperative. HEENT: Pupils equal, round, and reactive to light. Conjunctivae/corneas clear. Dressing noted  Neck: Supple, with full range of motion. No jugular venous distention. Trachea midline. Respiratory:  Normal respiratory effort.  Clear to auscultation, bilaterally without Rales/Wheezes/Rhonchi. Cardiovascular: Regular rate and rhythm with normal S1/S2 without murmurs, rubs or gallops. Abdomen: Soft, non-tender, non-distended with normal bowel sounds. Musculoskeletal: No clubbing, cyanosis or edema bilaterally. Full range of motion without deformity. Skin: Skin color, texture, turgor normal.  No rashes or lesions. Neurologic: Strength equal in all extremities. Patient able to follow commands. Has difficulty speaking but is able to get some words out. Capillary Refill: Brisk,< 3 seconds   Peripheral Pulses: +2 palpable, equal bilaterally       Labs:   Recent Labs     07/30/21  0515 07/30/21  1039 07/31/21  0445   WBC 8.1 8.6 18.8*   HGB 15.7 15.4 14.5   HCT 44.5 43.9 41.9    306 293     Recent Labs     07/30/21  0515 07/30/21  1039 07/31/21  0445    137 139   K 4.2 4.3 4.1    102 106   CO2 23 24 23   BUN 11 13 14   CREATININE 0.8* 0.8* 0.8*   CALCIUM 9.2 9.2 8.7   PHOS 3.3  --  3.5     Recent Labs     07/29/21 2048 07/30/21  1039   AST 32 25   ALT 66* 62*   BILITOT 0.6 0.6   ALKPHOS 65 64     Recent Labs     07/30/21  1040   INR 1.06     Recent Labs     07/29/21 2048   TROPONINI <0.01       Urinalysis:      Lab Results   Component Value Date    NITRU Negative 07/29/2021    BLOODU Negative 07/29/2021    SPECGRAV <=1.005 07/29/2021    GLUCOSEU Negative 07/29/2021       Radiology:  CT Head wo Contrast   Final Result   1. Bilateral craniotomy, postsurgical change including pneumocephalus. 2.  No complicating features, hydrocephalus or herniation. CT CHEST ABDOMEN PELVIS WO CONTRAST   Final Result      CHEST:      1. No evidence of metastatic disease in the chest.   2.  No acute intrathoracic abnormality. ABDOMEN/PELVIS:      1. No evidence of metastatic disease in the abdomen and pelvis. 2.  No acute intra-abdominopelvic abnormality. MRI BRAIN W WO CONTRAST   Final Result      1.  Bilateral cerebral intra-axial thin

## 2021-07-31 NOTE — PROGRESS NOTES
Patient admitted to PACU #13 from OR per bed at 2240 s/p RIGHT FRONTALTEMPORAL CRANIOTOMY FOR EVACUATION OF COLLECTONS AND DIAGNOSIS: LEFT PARIETAL CRANIOTOMY FOR EVACUATION OF COLLECTIONS (Right). Report received at bedside in PACU per CRNA. Patient was reported to be hypotensive in OR at times which he received treatment for but no complications reported. Patient connected to PACU monitoring equipment. IVF's infusing with site unremarkable. Patient arrived to PACU unresponsive d/t not being wakeful from anesthesia with oral airway in place but with respirations easy, even and regular with no pain noted or verbalized. Right side of head and left side of head both with surgical dressings C,D,I (DSD & medipore tape) with no drainage noted. Garcia catheter in place and draining clear, yellow urine. No further changes. Will continue to monitor closely for any change.

## 2021-07-31 NOTE — PROGRESS NOTES
Pt admitted to room 4510. Moaning in pain, prn dilaudid given. Pt unable to answer any orientation questions. Moves all extremities spontaneously and follows most commands. PERRLA. Pt remains on room air. Garcia in place draining clear yellow urine. Bed locked and in lowest position, call light within reach. Will continue to monitor closely and reassess.

## 2021-07-31 NOTE — PROGRESS NOTES
Vitals for the past 8 hrs:   BP Temp Pulse Resp SpO2 Height   07/31/21 0900 109/80 -- 60 10 96 % --   07/31/21 0806 -- -- -- -- -- 5' 10\" (1.778 m)   07/31/21 0800 116/89 98.5 °F (36.9 °C) 69 13 -- --   07/31/21 0700 106/77 -- 55 10 -- --   07/31/21 0600 103/72 -- 59 9 -- --   07/31/21 0500 -- -- 61 -- -- --   07/31/21 0415 111/76 -- 61 11 -- --   07/31/21 0400 109/73 -- 59 11 -- --   07/31/21 0345 112/73 -- 60 11 -- --   07/31/21 0300 -- -- 67 -- -- --   07/31/21 0245 116/80 -- 66 9 -- --   07/31/21 0230 111/80 -- 63 11 98 % --   07/31/21 0215 109/75 -- 63 11 97 % --   07/31/21 0200 116/78 -- 68 10 -- --       Intake/Output Summary (Last 24 hours) at 7/31/2021 0948  Last data filed at 7/31/2021 0900  Gross per 24 hour   Intake 1649 ml   Output 2475 ml   Net -826 ml       Review of Systems   Constitutional: Negative for activity change, appetite change, chills and diaphoresis. HENT: Negative for congestion, postnasal drip and rhinorrhea. Respiratory: Negative for choking, chest tightness and shortness of breath. Cardiovascular: Negative for chest pain, palpitations and leg swelling. Gastrointestinal: Negative for abdominal distention, abdominal pain and constipation. Genitourinary: Negative for dysuria, enuresis and flank pain. Musculoskeletal: Negative for gait problem and joint swelling. Skin: Negative for rash and wound. Neurological: Positive for speech difficulty and headaches. Vision changes   Psychiatric/Behavioral: Positive for confusion. Physical Exam  Constitutional:       Appearance: Normal appearance. HENT:      Head:      Comments: S/p L and R frontotemporal craniotomy      Nose: No congestion or rhinorrhea. Mouth/Throat:      Mouth: Mucous membranes are moist.   Eyes:      Extraocular Movements: Extraocular movements intact. Pupils: Pupils are equal, round, and reactive to light. Cardiovascular:      Rate and Rhythm: Normal rate and regular rhythm. Pulses: Normal pulses. Heart sounds: Normal heart sounds. Pulmonary:      Effort: Pulmonary effort is normal.      Breath sounds: Normal breath sounds. Abdominal:      General: Abdomen is flat. Bowel sounds are normal.      Palpations: Abdomen is soft. Musculoskeletal:         General: No swelling. Right lower leg: No edema. Left lower leg: No edema. Neurological:      Mental Status: He is alert. He is disoriented. Comments: Speech difficulty     LABS:    CBC:   Recent Labs     07/30/21 0515 07/30/21 1039 07/31/21  0445   WBC 8.1 8.6 18.8*   HGB 15.7 15.4 14.5   HCT 44.5 43.9 41.9    306 293   MCV 91.7 92.2 92.5     Renal:    Recent Labs     07/30/21 0515 07/30/21 1039 07/31/21  0445    137 139   K 4.2 4.3 4.1    102 106   CO2 23 24 23   BUN 11 13 14   CREATININE 0.8* 0.8* 0.8*   GLUCOSE 144* 132* 140*   CALCIUM 9.2 9.2 8.7   MG 2.60*  --  2.50*   PHOS 3.3  --  3.5   ANIONGAP 11 11 10     Hepatic:   Recent Labs     07/29/21 2048 07/29/21 2048 07/30/21 0515 07/30/21 1039 07/31/21  0445   AST 32  --   --  25  --    ALT 66*  --   --  62*  --    BILITOT 0.6  --   --  0.6  --    PROT 7.1  --   --  6.9  --    LABALBU 4.5   < > 4.2 4.4 3.9   ALKPHOS 65  --   --  64  --     < > = values in this interval not displayed. Troponin:   Recent Labs     07/29/21 2048   TROPONINI <0.01     BNP: No results for input(s): BNP in the last 72 hours. Lipids: No results for input(s): CHOL, HDL in the last 72 hours. Invalid input(s): LDLCALCU, TRIGLYCERIDE  ABGs:  No results for input(s): PHART, KET2UPV, PO2ART, MSD3KCY, BEART, THGBART, L2UTTRQI, QJO8PFK in the last 72 hours. INR:   Recent Labs     07/30/21  1040   INR 1.06     Lactate: No results for input(s): LACTATE in the last 72 hours. Cultures:  -----------------------------------------------------------------  RAD:   CT Head wo Contrast   Final Result   1.   Bilateral craniotomy, postsurgical change including pneumocephalus. 2.  No complicating features, hydrocephalus or herniation. CT CHEST ABDOMEN PELVIS WO CONTRAST   Final Result      CHEST:      1. No evidence of metastatic disease in the chest.   2.  No acute intrathoracic abnormality. ABDOMEN/PELVIS:      1. No evidence of metastatic disease in the abdomen and pelvis. 2.  No acute intra-abdominopelvic abnormality. MRI BRAIN W WO CONTRAST   Final Result      1. Bilateral cerebral intra-axial thin ring-enhancing cystic lesions with surrounding vasogenic edema (dominant lesions with incomplete ring enhancement at the cortical gray matter margin) and no internal diffusion restriction. The thin well-defined ring    of enhancement and no internal diffusion restriction raises concern for nonpyrogenic abscesses, such as fungal or paracytic infection. Cystic metastases are a possibility, but considered less likely given the thin well-defined ring enhancement. Incomplete ring of enhancement along the cortical gray matter aspect of the lesions raises the thought of demyelinating disease, but this is unlikely given the hypointense signal on DWI images and the morphology of well-defined thin ring enhancement. 2. Stable mild right subfalcine herniation and uncal herniation               Fluoroscopy modified barium swallow with video    (Results Pending)         Assessment/Plan:   Maren Toure is a 47 y/o male with a one month hx of HA, blurry vision, and aphasia. CT showed multiple intraparenchymal cysts in brain bilaterally. POD 1 for R and L frontotemporal craniotomy for parietal cystic lesions. 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  CT chest/abdomen/pelvis showed no metastatic disease  -Patient reports being sexually active w/ previous male partners  -HIV screen negative, neuropathology pending  -POD 1 for R and L frontotemporal craniotomy  -continue decadron 4 mg Q6, Keppra 500 mg BID  -Fentanyl discontinued   -Q4 neuro exam, keep head of bed > 30 degrees. #Leukocytosis:  -WBC today 18.8 up from 8.6 yesterday  -Suspected 2/2 steroid initiation and post-surgical inflammation  -Patient on Cefepime and Vancomycin    Code Status:Full Code  FEN: NPO  PPX:  SCD, Pepcid  DISPO:  ICU     This patient has been staffed and discussed with Sandra Ta MD, PGY-1  07/31/21  9:48 AM    This patient has been staffed and discussed with Ronald Mendoza MD.   Patient examined, findings as discussed with Dr. Irvin Mclaughlin. Agree with assessment and plan. Initial findings are most consistent with an infectious etiology, and most likely an opportunistic infection. Definitive therapy awaiting stains and cultures.

## 2021-07-31 NOTE — PLAN OF CARE
SLP evaluation completed. Please refer to EMR.     Caryl Kapoor MA CCC-SLP; [phone]  Speech-Language Pathologist

## 2021-07-31 NOTE — CONSULTS
ICU [facility] Day: 0  ICU Day 0                                                        Code:Full Code  Admit Date: 7/30/2021  PCP: No primary care provider on file. CC: Headache and AMS    HISTORY OF PRESENT ILLNESS:     Toby Navarro is a 46 y.o male patient who was brought to the ED by his father 2/2 patients AMS and speech difficulty. His symptoms has been ongoing for the last 1.5 month and has progressively worsened with slurring of speech. He has had intermittent headaches. There is known intermittent visual changes with blurriness and partial vision loss. On 7/29 patient dropped items while at the dinner table which prompted father to bring him into the ED. Last reported normal was 1.5 months ago. Patient is not currently sexually active but his sexual preference is homosexual. Does eat pork. In the ED, CT revealed multiple cystic masses with minimal thin peripheral vasogenic edema and mass effect with 7mm midline shift. On 7/31 Patient underwent a right frontal temporal and left parietal craniotomy with specimen collections. Patient surgery was without any major complications and was admitted to the ICU for care. He was not verbally responsive on arrival to the ICU but was able to follow basic commands. Patient did have one bout of emesis on arrival.     PAST HISTORY:   History reviewed. No pertinent past medical history. No past surgical history on file. SocialHistory:   The patient lives at home    Alcohol: previous alcohol use  Illicit drugs: no use  Tobacco:  20 year smoker    Family History:  Family History   Problem Relation Age of Onset    Cancer Mother         Believed to be lung cancer with brain mets       MEDICATIONS:     No current facility-administered medications on file prior to encounter. No current outpatient medications on file prior to encounter.          Scheduled Meds:   dexamethasone  4 mg Intravenous Q6H levetiracetam  500 mg Intravenous BID    sodium chloride flush  5-40 mL Intravenous 2 times per day    pantoprazole  40 mg Oral QAM AC      Continuous Infusions:   sodium chloride 75 mL/hr at 07/31/21 0153    sodium chloride       PRN Meds:HYDROmorphone **OR** HYDROmorphone, naloxone, sodium chloride flush, sodium chloride, ondansetron **OR** ondansetron, polyethylene glycol, acetaminophen **OR** acetaminophen, labetalol    Allergies: No Known Allergies    REVIEW OF SYSTEMS:       History obtained from chart review    Review of Systems   Unable to perform ROS: Patient nonverbal       PHYSICAL EXAM:       Vitals: /76   Pulse 61   Temp 98.2 °F (36.8 °C) (Oral)   Resp 11   Wt 182 lb 8.7 oz (82.8 kg)   SpO2 98%     I/O:      Intake/Output Summary (Last 24 hours) at 7/31/2021 0529  Last data filed at 7/31/2021 0030  Gross per 24 hour   Intake 1137 ml   Output 2050 ml   Net -913 ml     I/O this shift:  In: 137 [I.V.:137]  Out: 350 [Urine:350]  I/O last 3 completed shifts: In: 1000 [I.V.:1000]  Out: 1700 [Urine:1700]    Physical Examination:     Physical Exam  Eyes:      Extraocular Movements: Extraocular movements intact. Pupils: Pupils are equal, round, and reactive to light. Cardiovascular:      Rate and Rhythm: Normal rate and regular rhythm. Pulmonary:      Effort: Pulmonary effort is normal.      Breath sounds: Normal breath sounds. No wheezing. Abdominal:      General: There is no distension. Tenderness: There is no abdominal tenderness. Neurological:      Mental Status: He is alert. Sensory: No sensory deficit. Motor: Weakness present. Coordination: Coordination normal.      Comments: Aphasic, motor coordination in the upper and lower extremities         No results for input(s): PHART, YVA9MHQ, PO2ART in the last 72 hours.         DATA:       Labs:  CBC:   Recent Labs     07/30/21  0515 07/30/21  1039 07/31/21  0445   WBC 8.1 8.6 18.8*   HGB 15.7 15.4 14.5   HCT 44.5 43.9 41.9    306 293       BMP:   Recent Labs     07/29/21 2048 07/30/21  0515 07/30/21  1039   * 137 137   K 3.9 4.2 4.3   CL 97* 103 102   CO2 25 23 24   BUN 11 11 13   CREATININE 1.0 0.8* 0.8*   GLUCOSE 134* 144* 132*   PHOS  --  3.3  --      LFT's:   Recent Labs     07/29/21 2048 07/30/21  1039   AST 32 25   ALT 66* 62*   BILITOT 0.6 0.6   ALKPHOS 65 64     Troponin:   Recent Labs     07/29/21 2048   Piedad Pill <0.01     BNP:No results for input(s): BNP in the last 72 hours. ABGs: No results for input(s): PHART, [id number], PO2ART in the last 72 hours. INR:   Recent Labs     07/30/21  1040   INR 1.06       U/A:  Recent Labs     07/29/21 2240 07/29/21 2240 07/29/21  2251   COLORU Straw  --   --    PHUR 7.0   < > 6.0   CLARITYU Clear  --   --    SPECGRAV <=1.005  --   --    LEUKOCYTESUR Negative  --   --    UROBILINOGEN 0.2  --   --    BILIRUBINUR Negative  --   --    BLOODU Negative  --   --    GLUCOSEU Negative  --   --     < > = values in this interval not displayed. CT Head wo Contrast   Final Result   1. Bilateral craniotomy, postsurgical change including pneumocephalus. 2.  No complicating features, hydrocephalus or herniation. CT CHEST ABDOMEN PELVIS WO CONTRAST   Final Result      CHEST:      1. No evidence of metastatic disease in the chest.   2.  No acute intrathoracic abnormality. ABDOMEN/PELVIS:      1. No evidence of metastatic disease in the abdomen and pelvis. 2.  No acute intra-abdominopelvic abnormality. MRI BRAIN W WO CONTRAST   Final Result      1. Bilateral cerebral intra-axial thin ring-enhancing cystic lesions with surrounding vasogenic edema (dominant lesions with incomplete ring enhancement at the cortical gray matter margin) and no internal diffusion restriction. The thin well-defined ring    of enhancement and no internal diffusion restriction raises concern for nonpyrogenic abscesses, such as fungal or paracytic infection.  Cystic metastases are a possibility, but considered less likely given the thin well-defined ring enhancement. Incomplete ring of enhancement along the cortical gray matter aspect of the lesions raises the thought of demyelinating disease, but this is unlikely given the hypointense signal on DWI images and the morphology of well-defined thin ring enhancement. 2. Stable mild right subfalcine herniation and uncal herniation                   Micro: Pending    ASSESSMENT AND PLAN:   Ruthie Dye is a 46 y.o. male, who was diagnosed with an intraparenchymal cystic lesion in the brain. Intraparenchymal cystic lesions. 5 cystic lesions shown on MRI probably resultant from infectious process (neurocystericosis, Toxo, nocardia), CT Chest/abdomen/pelvis showed no evidence of metastatic disease.  Possibly resultant of immunosuppression with HIV labs pending    -Completed craniotomy on 7/30/21  -Continue with Decadron 4mg Q6, Keppra 500mg BID,   -Started Fentanyl 50mcg once for pain  -SBP<160 with PRN Labetalol  -HIV Screen pending, Neuro pathology specimens pending  -Repeat CT head for change in neuro exam  -Keep head >30 degrees  -Neuro Exam Q4    Code Status:Full Code  FEN: NPO  PPX:  SCD, Pepcid  DISPO:  ICU    This patient has been staffed and discussed with Guy Cole MD  -----------------------------  Paty Colvin DO, PGY-1  7/31/2021  5:29 AM

## 2021-07-31 NOTE — PROGRESS NOTES
Speech Language Pathology  Facility/Department: [facility]  Initial Speech/Language/Cognitive Assessment    NAME: Tino Burton  : 1980   MRN: [id number]  ADMISSION DATE: 2021  ADMITTING DIAGNOSIS: has Brain mass; Somnolence; and Dysphasia on their problem list.  DATE ONSET: 2 wks prior to admission    Date of Eval: 2021   Evaluating Therapist: JUSTINE Rios    RECENT RESULTS MRI BRAIN (21)  1. Bilateral cerebral intra-axial thin ring-enhancing cystic lesions with surrounding vasogenic edema (dominant lesions with incomplete ring enhancement at the cortical gray matter margin) and no internal diffusion restriction. The thin well-defined ring    of enhancement and no internal diffusion restriction raises concern for nonpyrogenic abscesses, such as fungal or paracytic infection. Cystic metastases are a possibility, but considered less likely given the thin well-defined ring enhancement. Incomplete ring of enhancement along the cortical gray matter aspect of the lesions raises the thought of demyelinating disease, but this is unlikely given the hypointense signal on DWI images and the morphology of well-defined thin ring enhancement. 2. Stable mild right subfalcine herniation and uncal herniation         RECENT RESULTS  CT OF HEAD (21)  1.  Bilateral craniotomy, postsurgical change including pneumocephalus. 2.  No complicating features, hydrocephalus or herniation.          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 Pt with reduced initiation to communicate wants / needs but increasing use of gestures + single word utterances noted towards end of session to assist with expressing needs. Pt successful with answering Y / N questions to indicate preferences - recommend staff implement this as primary means of clarifying pt's needs and asking questions in different forms (eg \"are you in pain? \" Zoey Rumble you comfortable? \") to confirm pt comprehension / intent. Recommendations:  Requires SLP Intervention: Yes  Duration/Frequency of Treatment: 3-5x/wk for LOS  D/C Recommendations:  (ongoing ST indicated)       Plan:   Goals:  Short-term Goals  Goal 1: Pt will verbalize personal information with mod cues. Goal 2: Pt will complete automatic speech tasks with 50% accuracy or mod cues. Goal 3: Pt will demonstrate comprehension of mildly complex verbal information with 80% accuracy or min cues. Goal 4: Pt will participate in ongoing cognitive linguistic assessment. Patient/family involved in developing goals and treatment plan: yes - pt   Pt goal: unable to state    Subjective:  General  Chart Reviewed: Yes  Additional Pertinent Hx: Pt admitted 7/30/21 s/p AMS and speech difficulty - imaging revealed multiple brain lesions and pt to OR for bilateral crani last night. ID consulted d/t concern for infectious process.  No significant PMHx noted. Family / Caregiver Present: No  Subjective  Subjective: Pt in bed for assessment. Flat affect with reduced initiation t/o. Social/Functional History  Lives With: Family (per chart)  Additional Comments: Unable to obtain social hx given severity of communication impairments  Vision  Vision: Impaired (reduced visual attention, suspect possible R neglect)  Hearing  Hearing: Within functional limits           Objective:     Oral/Motor  Oral Motor: Exceptions to [facility]  Labial ROM: Reduced right  Labial Symmetry: Abnormal symmetry right  Labial Strength: Reduced  Lingual Symmetry: Abnormal symmetry right    Auditory Comprehension  Comprehension: Exceptions  Yes/No Questions: Mild (82% accuracy)  One Step Basic Commands: Mild (min cues intermittently; overall adequate)  Two Step Basic Commands: Moderate (50% accuracy; perseverative)  Interfering Components: Working memory  Effective Techniques: Repetition; Slowed speech;Stressing words    Reading Comprehension  Reading Status:  (did not assess)    Expression  Primary Mode of Expression: Verbal    Verbal Expression  Verbal Expression: Exceptions to functional limits  Initiation: Moderate  Repetition: Mild  Automatic Speech: Severe (MAX cues required)  Conversation: Severe (limited verbal output)  Interfering Components: Attention    Written Expression  Dominant Hand: Right    Motor Speech  Motor Speech: Exceptions to [facility]  Intelligibility: Mild (some communication breakdowns at word level - unclear if dysarthria vs apraxia)  Apraxia: Verbal (suspected)  Dysarthria : Mild (unable to fully assess - limited verbal output)    Pragmatics/Social Functioning  Pragmatics: Exceptions to [facility]  Affect: Severe (flat)  Eye Contact: Severe (distant gaze w/o attention to speaker unless cued)    Cognition: limited ability to assess given speech / language impairments     Orientation  Overall Orientation Status: Impaired (oriented to self, location, and situation via Y / N but inconsistent)  Attention  Attention: Exceptions to [facility]  Sustained Attention: Moderate  Memory  Memory: Unable to assess      Prognosis:  Speech Therapy Prognosis  Prognosis: Good  Prognosis Considerations: Age; Severity of Impairments;Medical Diagnosis  Individuals consulted  Consulted and agree with results and recommendations: Patient;RN    Education:  Patient Education: Educated pt to role of SLP, purpose of visit, rationale for evaluation, impact neuro dx can have on speech / language, aphasia, results of session, and recommendations. Patient Education Response: Verbalizes understanding;Needs reinforcement  Safety Devices in place: Yes  Type of devices: All fall risk precautions in place    Therapy Time:   Individual Concurrent Group Co-treatment   Time In 0824 0000 0000 0000   Time Out 0839 0000 0000 0000   Minutes 15 0 0 0   Variance: 0  Timed Code Treatment Minutes: 0 Minutes  Total Treatment Time: [address], [place] CCC-SLP; [address].[address]  Speech-Language Pathologist  Pager # [phone]  Phone # [phone]  Office # [phone]    If patient is discharged prior to next session, this note will serve as discharge summary.

## 2021-07-31 NOTE — PLAN OF CARE
Problem: Falls - Risk of:  Goal: Will remain free from falls  Description: Will remain free from falls  7/31/2021 0955 by Ruth Ann Licona RN  Outcome: Ongoing  7/31/2021 0611 by Rojas Sánchez RN  Outcome: Ongoing     Problem: Discharge Planning:  Goal: Discharged to appropriate level of care  Description: Discharged to appropriate level of care  7/31/2021 0955 by Ruth Ann Licona RN  Outcome: Ongoing  7/31/2021 0611 by Rojas Sánchez RN  Outcome: Ongoing     Problem: Verbal Communication - Impaired:  Goal: Functional communication will improve  Description: Functional communication will improve  7/31/2021 0955 by Ruth Ann Licona RN  Outcome: Ongoing  7/31/2021 0611 by Rojas Sánchez RN  Outcome: Ongoing     Problem: Pain:  Goal: Pain level will decrease  Description: Pain level will decrease  Outcome: Ongoing  Goal: Recognizes and communicates pain  Description: Recognizes and communicates pain  Outcome: Ongoing     Problem: Mobility - Impaired:  Goal: Able to ambulate independently  Description: Able to ambulate independently  7/31/2021 0611 by Rojas Sánchez RN  Outcome: Ongoing     Problem: Injury - Risk of, Healthcare-Acquired Condition:  Goal: Will remain free from falls  Description: Will remain free from falls  7/31/2021 0955 by Ruth Ann Licona RN  Outcome: Ongoing  7/31/2021 0611 by Rojas Sánchez RN  Outcome: Ongoing  Goal: Absence of urinary tract infection signs and symptoms  Description: Absence of urinary tract infection signs and symptoms  Outcome: Ongoing     Problem: Nutrition Deficit - Risk of:  Goal: Ability to achieve adequate nutritional intake will improve  Description: Ability to achieve adequate nutritional intake will improve  Outcome: Ongoing     Problem: Swallowing - Impaired:  Goal: Able to swallow without choking  Description: Able to swallow without choking  Outcome: Ongoing  Goal: Absence of aspiration  Description: Absence of aspiration  Outcome: Ongoing     Problem:  Bowel Function - Altered:  Goal: Bowel elimination is within specified parameters  Description: Bowel elimination is within specified parameters  Outcome: Ongoing     Problem: Urinary Elimination - Impaired:  Goal: Urinary elimination within specified parameters  Description: Urinary elimination within specified parameters  Outcome: Ongoing     Problem: Self-Care Deficit:  Goal: Ability to perform activities of daily living will improve  Description: Ability to perform activities of daily living will improve  Outcome: Ongoing  Goal: Able to perform ADL with assistance  Description: Able to perform ADL with assistance  Outcome: Ongoing     Problem: Psychosocial Distress:  Goal: Absence of psychosocial distress  Description: Absence of psychosocial distress  Outcome: Ongoing     Problem: Pain:  Goal: Pain level will decrease  Description: Pain level will decrease  Outcome: Ongoing

## 2021-07-31 NOTE — OP NOTE
OPERATIVE REPORT    Patient Name: Ghassan Gurrola  MRN: [id number]  : 1980  DOS: 2021    PRE-OPERATIVE DIAGNOSIS: Bilateral cystic masses    POST-OPERATIVE DIAGNOSIS: Bilateral cystic masses    OPERATIVE PROCEDURES PERFORMED:  1. Stereotactic right frontotemporal craniotomy for evacuation of temporal and parietal cystic lesions  2. Stereotactic left frontotemporal craniotomy for evacuation of frontal cystic lesion  3. Intraoperative neuromonitoring (MEP, SSEP, EEG)   4. Intraoperative Brainlab neuronavigation    SURGEON: Liliana Hall M.D., Ph.D.    Vanesa Seeds: MAYURI Pruitt    Ms. Nu Anna served as assistant on this surgery due to the complex nature of the procedure and the lack of a resident or fellow assistant. She provided assistance with opening, manipulation of critical neural elements, and closing. ANESTHESIA: General endotracheal    ESTIMATED BLOOD LOSS:  100 mL. DRAINS: None    IMPLANTS:   1. Synthes cranial plating system    SPECIMEN:  1. Right temporal mass for permanent histology, culture  2. Right temporal mass fluid for permanent histology, culture     DISPOSITION:  PACU in stable condition. INDICATIONS:  Mr. Arline Eller is a 46year old man who presented with severe aphasia over several weeks and headaches. On workup, he was found to have several large bihemispheric ring-enhancing lesions. The dominant lesions on the right were in the right temporal and parietal regions and on the left, a posterior frontal lesion was noted. Given the patients symptoms, and the sizes of the masses, and the absence of a diagnosis, surgical resection was recommended. The patients father understands the risks and benefits of the procedure including but not limited to bleeding, infection, seizures, weakness, cognitive dysfunction, worsened neurologic injury, vascular injury, cerebral spinal fluid leak, inability to completely remove the masses, anesthesia risks, and death. PROCEDURES IN DETAIL:      Under universal protocol and informed consent, the patient was brought to the operating room. General anesthesia was induced and the patient was intubated. He was positioned supine and all pressure points were appropriately padded. The patient was then placed in [address] fixation with the head in midline position and slightly flexed. Brainlab Image Guidance was registered and verified. A right temporoparietal incision was planned so it could allow conversion to a hemicraniotomy incision and shaved. A left frontotemporal incision was also planned using stereotaxy. A Garcia catheter and radial arterial line were placed. Neuromonitoring leads for SSEPs, MEPs, and EEG monitoring were placed. Intravenous antibiotics (Ancef), Decadron, and Keppra were given by anesthesia. A time out was completed and the surgical sites were prepped and draped in the usual sterile fashion. After infiltration of the skin with 0.5% Lidocaine with 1:100,000 Epinephrine, the cranial incision on the right was made with a #15 scalpel. Bovie electrocautery was then used to complete the incision down to the bone and a myocutaneous flap was raised anteriorly via subperiosteal dissection of the temporalis muscle with a #1 Penfield dissector. A self-retaining retractor was placed to retract the scalp flap for the remainder of the case. After verifying the position of the tumor, a 4 mm cutter ball bit on a [employer] drill was used to drill two bur holes: anterior to the ideal entry point for the temporal mass and posterior to the ideal entry point for the parietal mass. The dura was dissected away from the bone and a craniotomy was turned with the B1 footplate attachment. The bone flap was progressively elevated with a #1 Penfield dissector and the dura remained intact. The brain was noted to be extremely firm and edematous.  Thus, the decision was made to access the cystic lesions stereotactically for drainage prior to opening the dura completely. Thus, using a #11 scalpel, a 2 mm durotomy was made overlying the entry point to the temporal mass. The underlying genevieve was coagulated and incised. The entry point for navigation was reverified. A Tuohy brain needle was used to cannulate the temporal cyst under stereotactic guidance. There was immediate egress of clear yellow fluid. Approximately 25 mL of fluid was expressed, swabbed for culture, and sent for analysis. Using Metzenbaum scissors, the remaining dura was opened in a trap-door fashion based inferiorly. The dural flap was then reflected and secured with 4-0 Nurolon sutures. At this point, the Tuohy needle tract was followed to the lesion. The corticectomy was expanded and the interior of the cystic lesion was explored. The cyst was extensively loculated with numerous membranes lined with necrotic appearing whitish material. These were cleared circumferentially within the cavity with bipolar cautery and suction. Several samples were sent for culture and histopathologic analysis. Attention was then turned to the right parietal lesion. A second corticectomy was performed overlying the lesion. Using Brainlab neuronavigation, the corticectomy was expanded and followed to the lesion. Again, extensive clear yellowish fluid was obtained when the cyst was breached. Similar septation and necrotic whitish material was encountered and progressively cleared. Once resection was completed, meticulous hemostasis was assured and the surgical cavities were extensively irrigated. The dura was closed primarily using 4-0 Nurolon sutures. Adherus dural sealant was sprayed liberally along the entire suture line to reduce the risk of cerebrospinal fluid egress. The bone flap was then secured with Pressglue Craniofacial miniplates and self-tapping 4mm screws. The wound was copiously irrigated with Bacitracin solution.  The incision was then closed in layers using 2-0 Vicryl sutures in the temporalis, galeal layer, and in the subcutaneous tissues. The skin was closed with staples. We then turned to the left-sided approach. The scalp was infiltrated with 0.5% Lidocaine with 1:100,000 Epinephrine, and the was made with a #15 scalpel. Bovie electrocautery was then used to complete the incision down to the bone. The temporalis was dissected away from the bone with a #1 Penfield dissector and a self-retaining retractor was placed. After verifying the position of the tumor, a 4 mm cutter ball bit on a [employer] drill was used to drill a single bur holes at the most inferior aspect of the planned ideal entry point. The dura was dissected away from the bone and a craniotomy was turned with the B1 footplate attachment. The bone flap was easily elevated with a #1 Penfield dissector and the dura remained intact. The dura was opened in a trap-door fashion based superiorly then reflected and secured with 4-0 Nurolon sutures. The left posterior frontal mass borders were established using intraoperative Brainlab strereotactic neuronavigation. A small corticectomy was initiated using bipolar cautery and the cyst was immediately accessed. Again, clear yellow fluid was encountered and evacuated. The walls of the lesion were also cleared of the white necrotic debris. Finally, the decision was made to access the deeper left frontal lesion stereotactically as it theoretically could be accessed from the corticectomy which had already been performed. The entry point for navigation was repositioned and accuracy reverified. The Tuohy brain needle was then passed under stereotactic guidance towards the lesion. Despite two passes with neuronavigation clearly indicating a position within the lesion, no fluid was obtained. Thus, it was presumed that evacuation of the previous lesions affected the accuracy of the navigation system and further attempts to access the lesion were abandoned.      Again, meticulous hemostasis was assured and the surgical cavity was irrigated. The dura was closed primarily using 4-0 Nurolon sutures. Adherus dural sealant was sprayed liberally along the entire suture line to reduce the risk of cerebrospinal fluid egress. The bone flap was then secured with Synthes Craniofacial miniplates and self-tapping 4mm screws. The wound was copiously irrigated with Bacitracin solution. The incision was then closed in layers using 2-0 Vicryl sutures in the temporalis, galeal layer, and in the subcutaneous tissues. The skin was closed with staples. Both right and left-sided wounds were then dressed with antibiotic ointment, Telfa, and Medipore tape. All needle, sponge, and instrument counts were correct. Notably, SSEPs, MEPs, and EEG remained stable for the duration of the case. The patient was taken out of [place] 3-pin fixation, transfered to a hospital bed, extubated, and taken to the postoperative care unit in stable condition. I affirm in accordance with CMS regulations that I was present and scrubbed for all critical portions of the case.     COMPLICATIONS: None

## 2021-07-31 NOTE — ANESTHESIA POSTPROCEDURE EVALUATION
Department of Anesthesiology  Postprocedure Note    Patient: Flex Gómez  MRN: [id number]  [place]: 1980  Date of evaluation: 7/30/2021  Time:  11:48 PM     Procedure Summary     Date: 07/30/21 Room / Location: [facility]    Anesthesia Start: 1817 Anesthesia Stop: 2242    Procedure: RIGHT FRONTALTEMPORAL CRANIOTOMY FOR EVACUATION OF COLLECTONS AND DIAGNOSIS: LEFT PARIETAL CRANIOTOMY FOR EVACUATION OF COLLECTIONS (Right ) Diagnosis: (FIVE CYSTIC LESIONS ON THE BRAIN)    Surgeons: Adis Schwab MD Responsible Provider: Oralia Sow DO    Anesthesia Type: general, TIVA ASA Status: 3          Anesthesia Type: general, TIVA    Roverto Phase I: Roverto Score: 4    Roverto Phase II:      Last vitals: Reviewed and per EMR flowsheets.        Anesthesia Post Evaluation    Patient location during evaluation: PACU  Patient participation: complete - patient participated  Level of consciousness: sleepy but conscious (does not follow commands (preop baseline))  Airway patency: patent  Nausea & Vomiting: no nausea and no vomiting  Cardiovascular status: blood pressure returned to baseline  Respiratory status: acceptable  Hydration status: euvolemic no known allergies

## 2021-07-31 NOTE — BRIEF OP NOTE
Brief Postoperative Note      Patient: Svetlana Dueñas  YOB: 1980  MRN: [id number]    Date of Procedure: 7/30/2021    Pre-Op Diagnosis: FIVE CYSTIC LESIONS ON THE BRAIN    Post-Op Diagnosis: Same       Procedure(s):  RIGHT FRONTALTEMPORAL CRANIOTOMY FOR EVACUATION OF COLLECTONS AND DIAGNOSIS: POSSIBLE LEFT PARIETAL CRANIOTOMY FOR EVACUATION OF COLLECTIONS    Surgeon(s):  Ovidio Mitchell MD    Assistant:  Physician Assistant: Godwin Yeboah PA-C    Anesthesia: General    Estimated Blood Loss (mL): less than 127     Complications: None    Specimens:   ID Type Source Tests Collected by Time Destination   1 : RIGHT TEMPORAL MASS Specimen Head CULTURE, FUNGUS, CULTURE, SURGICAL, CULTURE WITH SMEAR, ACID FAST BACILLIUS Ovidio Mitchell MD 7/30/2021 1945    2 : RIGHT TEMPORAL FLUID Body Fluid Fluid BODY FLUID CELL COUNT WITH DIFFERENTIAL, CULTURE, FUNGUS, CULTURE, BODY FLUID Ovidio Mitchell MD 7/30/2021 1946    3 : RIGHT TEMPORAL MASS BIOPSY Tissue Tissue CULTURE, FUNGUS, CULTURE, TISSUE, CULTURE WITH SMEAR, ACID FAST BACILLIUS Ovidio Mitchell MD 7/30/2021 2016    A : RIGHT TEMPORAL FLUID Body Fluid Fluid SURGICAL PATHOLOGY Ovidio Mitchell MD 7/30/2021 1947    B : RIGHT TEMPORAL MASS BIOPSY Tissue Tissue SURGICAL PATHOLOGY Ovidio Mitchell MD 7/30/2021 1959        Implants:  * No implants in log *      Drains: * No LDAs found *    Findings: Large volume of clear yellowish fluid. Necrotic appearing soft tissue along the cavity walls. Drained two large right sided lesions and left sided lesion.      Electronically signed by Ovidio Mitchell MD on 7/30/2021 at 10:05 PM

## 2021-07-31 NOTE — PROGRESS NOTES
Pt able to state name and answer most yes or no questions appropriately. Pt stating he is in pain in head/face. Pain medicine given per order. Noticed right arm tremor during exam.  Notified neurosurgery Marilin MORGAN Ra of finding.

## 2021-07-31 NOTE — PROGRESS NOTES
PACU Transfer Note    Vitals:    07/31/21 0030   BP: 125/83   Pulse: 72   Resp: 14   Temp: 98.4 °F (36.9 °C)   SpO2: 100%       In: 137 [I.V.:137]  Out: 350 [Urine:350]    Pain assessment:    Pain Level:  (pt moaning and moving around a great deal)    Report given to Receiving unit RN. Patient taken to CT on way to ICU. No changes.      7/31/2021 12:34 AM

## 2021-07-31 NOTE — PROGRESS NOTES
Report called to Alan Courtney RN receiving patient in ICU with all information provided along with medications given in PACU. Alan Courtney informed that we will take patient to CT prior to bringing him to ICU.

## 2021-07-31 NOTE — PLAN OF CARE
Care plan reviewed.       Problem: Falls - Risk of:  Goal: Will remain free from falls  Description: Will remain free from falls  Outcome: Ongoing     Problem: Discharge Planning:  Goal: Discharged to appropriate level of care  Description: Discharged to appropriate level of care  Outcome: Ongoing     Problem: Verbal Communication - Impaired:  Goal: Functional communication will improve  Description: Functional communication will improve  Outcome: Ongoing     Problem: Mobility - Impaired:  Goal: Able to ambulate independently  Description: Able to ambulate independently  Outcome: Ongoing

## 2021-07-31 NOTE — PROGRESS NOTES
Speech Language Pathology  Facility/Department: [facility]   CLINICAL BEDSIDE SWALLOW EVALUATION    NAME: Mitch Boeck  : 1980  MRN: [id number]    ADMISSION DATE: 2021  ADMITTING DIAGNOSIS: has Brain mass; Somnolence; and Dysphasia on their problem list.  ONSET DATE: 2 wks prior to admission    Recent Chest Xray/CT of Chest: (21)  No acute process by radiograph. Date of Eval: 2021  Evaluating Therapist: JUSTINE De Leon    Current Diet level:  Current Diet : Regular  Current Liquid Diet : Thin      Primary Complaint  Patient Complaint: \"ow\" and pointed to pharynx - suspect soreness s/p intubation from sx; grimacing with swallow indicative of probable odynophagia    Pain:  Yes - facial grimacing and stating \"ow\" - RN notified and provided pain meds at end of session    Reason for Referral  Mitch Boeck was referred for a bedside swallow evaluation to assess the efficiency of his swallow function, identify signs and symptoms of aspiration and make recommendations regarding safe dietary consistencies, effective compensatory strategies, and safe eating environment. Impression  Dysphagia Impression : Oral dysphagia with reduced labial seal resulting in persistent anterior loss on R with all consistencies. Prolonged posterior transit of all with intermittent bolus holding noted. Pooling of secretions noted in R buccal cavity and mild oral residue on R with puree solids. Solids beyond puree not trialed d/t apparent odynophagia and need for instrumental assessment. No overt s/s associated with aspiration exhibited with any consistency trialed but unable to fully evaluate as pt did not follow directions for 3 oz water test and did not consistently vocalize to assess for wet quality. Recommend MBS for full assessment of oral and pharyngeal phases. Dysphagia Outcome Severity Scale: Level 3: Moderate dysphagia- Total assisstance, supervision or strategies.  Two or more diet consistencies restricted     Treatment Plan  Requires SLP Intervention: Yes  Duration/Frequency of Treatment: 3-5x/wk for LOS  D/C Recommendations:  (ongoing ST indicated)       Recommended Diet and Intervention  Diet Solids Recommendation: NPO (until MBS)  Liquid Consistency Recommendation: [place] water protocol (until MBS)  Recommended Form of Meds: Via alternative means of nutrition (if must be given orally, recommend one at a time with sip of water)  Recommendations: Modified barium swallow study; Dysphagia treatment  Therapeutic Interventions: Bolus control exercises;Oral motor exercises; Patient/Family education; Therapeutic PO trials with SLP    Compensatory Swallowing Strategies  Compensatory Swallowing Strategies: Upright as possible for all oral intake; Check for pocketing of food on the Right; Alternate solids and liquids    Treatment/Goals  Short-term Goals  Goal 1: Pt will consume PO with <3 instances of anterior loss on R x 2 sessions. Goal 2: Pt will participate in MBS procedure. Pt goal: unable to state    General  Chart Reviewed: Yes  Comments: Pt admitted 7/30/21 s/p AMS and speech difficulty - imaging revealed multiple brain lesions and pt to OR for bilateral crani last night. ID consulted d/t concern for infectious process. No significant PMHx noted. Subjective  Subjective: Pt in bed, flat affect and limited attention to conversational partner. RN present for portions of session. Behavior/Cognition: Alert;Confused; Requires cueing  Temperature Spikes Noted: No  Respiratory Status: Room air  O2 Device: None (Room air)  Communication Observation: Aphasia; Apraxia  Follows Directions: Simple  Dentition: Adequate  Patient Positioning: Upright in bed  Baseline Vocal Quality: Normal  Volitional Cough: Strong  Prior Dysphagia History: None per chart  Consistencies Administered: Dysphagia Pureed (Dysphagia I); Ice Chips; Thin - straw; Thin - cup           Vision/Hearing  Vision  Vision: Impaired (reduced visual attention, suspect possible R neglect)  Hearing  Hearing: Within functional limits    Oral Motor Deficits  Oral/Motor  Oral Motor: Exceptions to [facility]  Labial ROM: Reduced right  Labial Symmetry: Abnormal symmetry right  Labial Strength: Reduced  Lingual Symmetry: Abnormal symmetry right    Oral Phase Dysfunction  Oral Phase  Oral Phase: Exceptions  Oral Phase Dysfunction  Spillage Right: All  Decreased Anterior to Posterior Transit: All  Lingual/Palatal Residue: All     Indicators of Pharyngeal Phase Dysfunction   Pharyngeal Phase  Pharyngeal Phase: Exceptions (suspect odynophagia)    Prognosis  Prognosis  Prognosis for safe diet advancement: good  Barriers to reach goals: language deficits  Individuals consulted  Consulted and agree with results and recommendations: Patient;RN    Education  Patient Education: Educated pt to role of SLP, purpose of visit, rationale for evaluation, impact crani + intubation can have on swallow, aspiration and associated risks, results of session, oral dysphagia, and need for MBS procedure. Patient Education Response: Verbalizes understanding;Needs reinforcement  Safety Devices in place: Yes  Type of devices: All fall risk precautions in place       Therapy Time  SLP Individual Minutes  Time In: 0809  Time Out: 1616  Minutes: 15  SLP Co-Treatment Minutes  Time In: 0000  Time Out: 0000  Minutes: 0  SLP Total Treatment Time  Timed Code Treatment Minutes: 0 Minutes  Total Treatment Time: DANIELA Stout CCC-SLP; [id number]  Speech-Language Pathologist  Pager # [phone]  Phone # [phone]  Office # [phone]    If patient is discharged prior to next session, this note will serve as discharge summary.

## 2021-07-31 NOTE — CONSULTS
questions. Thank you for consulting pharmacy!   Junie Mena PharmD, [facility]  Wireless: [phone]  7/31/2021 8:45 AM

## 2021-07-31 NOTE — CONSULTS
NEUROSURGERY CONSULT NOTE    Radha Cintron  [id number]   1980   2021    Requesting physician: Sima Do MD    Reason for consultation:cystic lesions brain    History of present illness: Patient is a 46 y.o. male with no PMHx who presents with a few weeks of worsening aphasia and headaches. Reports that he has had difficulty finding the right words. He has also had headaches and vision changes. Describes the vision changes as sometimes blurriness and some times partial vision loss. He says the symptoms wax and wane. He takes ibuprofen for the headache but it does not help. Last night he was dropping items at dinner and his father brought him to the [facility] ED.      As per father- 1 month aog these problems started with mild facial paralysis, particularly in the upper lip area with visual problem that fluctuated. Concerned with cost he neglected it. His dad helped put constantino for medicade. Symptoms got worse suddenly around the past few days and  had periods of feeling lethargic. Last night having dinner at 1900 the pt felt weak and drop dish of sphaghetti and his cup of water. His dad saw this and immediately brought him to the hospital. In the ED the pt could not remember his name,  etc.  After given the IV steriod, the pt said he knew the answers but couldn't get the answers out and was able to answer the questions correctly. In  his mother had symptoms that involved small projection in the skull and could not find out what it was . One night she had a intense headache and went to ED and found out she had aggressive bone tumors involving ribs pelvis, skull. 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      ROS:   GENERAL:  Denies fever or recent illness.  Denies weight changes   EYES:  + vision change or diplopia  EARS:  Denies hearing loss  CARDIAC:  Denies chest pain  RESPIRATORY:  Denies shortness of breath  SKIN:  Denies rash or lesions   HEM:  Denies excessive bruising  PSYCH:  Denies anxiety or depression  NEURO:  +headache, denies numbness or tingling or lateralizing weakness   :  Denies urinary difficulty  GI: Denies nausea, vomiting, diarrhea or constipation  MUSCULOSKELETAL:  No arthralgias    No Known Allergies    History reviewed. No pertinent past medical history. No past surgical history on file. Social History     Occupational History    Not on file   Tobacco Use    Smoking status: Current Every Day Smoker     Types: E-Cigarettes    Smokeless tobacco: Never Used   Substance and Sexual Activity    Alcohol use: Not Currently    Drug use: Never    Sexual activity: Not on file        Family History   Problem Relation Age of Onset    Cancer Mother         Believed to be lung cancer with brain mets        No outpatient medications have been marked as taking for the 7/30/21 encounter [facility] Encounter).         Current Facility-Administered Medications   Medication Dose Route Frequency Provider Last Rate Last Admin    sodium chloride flush 0.9 % injection 10 mL  10 mL Intravenous 2 times per day Mortimer Starch, MD        sodium chloride flush 0.9 % injection 10 mL  10 mL Intravenous PRN Mortimer Starch, MD        0.9 % sodium chloride infusion  25 mL Intravenous PRN Mortimer Starch, MD        acetaminophen (TYLENOL) tablet 650 mg  650 mg Oral Q4H PRN Mortimer Starch, MD        oxyCODONE (ROXICODONE) immediate release tablet 5 mg  5 mg Oral Q4H PRN Mortimer Starch, MD        Or    oxyCODONE (ROXICODONE) immediate release tablet 10 mg  10 mg Oral Q4H PRN Mortimer Starch, MD        HYDROmorphone (DILAUDID) injection 0.5 mg  0.5 mg Intravenous Q3H PRN Mortimer Starch, MD        Or    HYDROmorphone (DILAUDID) injection 1 mg  1 mg Intravenous Q3H PRN Mortimer Starch, MD        naloxone [facility]) injection 0.2 mg  0.2 mg Intravenous PRN Mortimer Starch, MD        promethazine (PHENERGAN) tablet 12.5 mg  12.5 mg Oral Q6H PRN Mortimer Starch, MD        Or    ondansetron [facility] tested  Pulmonary: unlabored respiratory effort  Cardiovascular:  Warm well perfused. No peripheral edema  Gastrointestinal: abdomen soft, NT, ND    Neurological:  Mental Status: Awake, alert, aphasia  Attention: Intact  Language: expressive aphasia   Sensation: Intact to all extremities to light touch  Coordination: Intact      Cranial Nerves:  II: Visual acuity not tested, vision loss CORTES quad  III, IV, VI: PERRL, 3 mm bilaterally, EOMI, no nystagmus noted  V: Facial sensation intact bilaterally to touch  VII: Face symmetric  VIII: Hearing intact bilaterally to spoken voice  IX: Palate movement equal bilaterally  XI: Shoulder shrug equal bilaterally  XII: Tongue midline    Musculoskeletal:   Gait: Not tested   Assist devices: None   Tone:normal5   Motor strength:    Right  Left    Right  Left    Deltoid  5 5  Hip Flex  5 5   Biceps  5 5  Knee Extensors  5 5   Triceps  5 5  Knee Flexors  5 5   Wrist Ext  5 5  Ankle Dorsiflex. 5 5   Wrist Flex  5 5  Ankle Plantarflex. 5 5   Handgrip  5 5  Ext Jus Longus  5 5   Thumb Ext  5 5         Radiological Findings:    I personally reviewed the patient's imaging which consists of a CT head dated 7/29/2021. This demonstrates numerous bihemispheric lesions with extensive surrounding vasogenic edema and midline shift. Labs:  Recent Labs     07/31/21  0445   WBC 18.8*   HGB 14.5   HCT 41.9          Recent Labs     07/31/21  0445      K 4.1      CO2 23   BUN 14   CREATININE 0.8*   GLUCOSE 140*   CALCIUM 8.7   PHOS 3.5   MG 2.50*       Recent Labs     07/30/21  1040   PROTIME 12.0   INR 1.06   APTT 33.0       Patient Active Problem List    Diagnosis Date Noted    Somnolence 07/30/2021    Dysphasia 07/30/2021    Brain mass 07/29/2021       Assessment:  Patient is a 46 y.o. 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 Plan:  1.  Likely requires surgical intervention given extensive size of right-sided lesions and left speech area lesion. Will wait for MRI for surgical consideration. 2. Frequent neuro checks  3. For change in exam MUST contact neurosurgery team along with critical care or primary team  4. Brain Mass:  - MRI Brain w wo to assist with establish likely diagnosis  - CT chest/abd/pelvis  5. Cerebral edema:  - Keep Na within normal limits  - Decadron 4 mg Q6H  - Keep HOB >30 degrees  - NO dextrose in IVF's or in IV drips  6. Seizure prophylaxis: Keppra 1000mg BID  7. GI Prophylaxis: Pepcid  8. NPO  9. Thank you for consult. Will follow inpatient.   Please call with any questions or decline in neurological status    Lucinda Grove MD, PhD  [address], [address] [place], [place], [address] [phone] (c), [phone] (o)

## 2021-08-01 LAB
ALBUMIN SERPL-MCNC: 4 G/DL (ref 3.4–5)
ANION GAP SERPL CALCULATED.3IONS-SCNC: 10 MMOL/L (ref 3–16)
BASOPHILS ABSOLUTE: 0 K/UL (ref 0–0.2)
BASOPHILS RELATIVE PERCENT: 0.3 %
BUN BLDV-MCNC: 11 MG/DL (ref 7–20)
CALCIUM SERPL-MCNC: 9.1 MG/DL (ref 8.3–10.6)
CHLORIDE BLD-SCNC: 104 MMOL/L (ref 99–110)
CO2: 28 MMOL/L (ref 21–32)
CREAT SERPL-MCNC: 0.8 MG/DL (ref 0.9–1.3)
EOSINOPHILS ABSOLUTE: 0 K/UL (ref 0–0.6)
EOSINOPHILS RELATIVE PERCENT: 0.1 %
GFR AFRICAN AMERICAN: >60
GFR NON-AFRICAN AMERICAN: >60
GLUCOSE BLD-MCNC: 129 MG/DL (ref 70–99)
HCT VFR BLD CALC: 40 % (ref 40.5–52.5)
HEMOGLOBIN: 14.1 G/DL (ref 13.5–17.5)
LYMPHOCYTES ABSOLUTE: 1.3 K/UL (ref 1–5.1)
LYMPHOCYTES RELATIVE PERCENT: 8.3 %
MAGNESIUM: 2.6 MG/DL (ref 1.8–2.4)
MCH RBC QN AUTO: 32 PG (ref 26–34)
MCHC RBC AUTO-ENTMCNC: 35.2 G/DL (ref 31–36)
MCV RBC AUTO: 91.2 FL (ref 80–100)
MONOCYTES ABSOLUTE: 0.7 K/UL (ref 0–1.3)
MONOCYTES RELATIVE PERCENT: 4.8 %
NEUTROPHILS ABSOLUTE: 13.1 K/UL (ref 1.7–7.7)
NEUTROPHILS RELATIVE PERCENT: 86.5 %
PDW BLD-RTO: 13 % (ref 12.4–15.4)
PHOSPHORUS: 3.2 MG/DL (ref 2.5–4.9)
PLATELET # BLD: 267 K/UL (ref 135–450)
PMV BLD AUTO: 7.8 FL (ref 5–10.5)
POTASSIUM SERPL-SCNC: 4.1 MMOL/L (ref 3.5–5.1)
RBC # BLD: 4.39 M/UL (ref 4.2–5.9)
SODIUM BLD-SCNC: 142 MMOL/L (ref 136–145)
VANCOMYCIN TROUGH: 8.7 UG/ML (ref 10–20)
WBC # BLD: 15.2 K/UL (ref 4–11)

## 2021-08-01 PROCEDURE — 6360000002 HC RX W HCPCS: Performed by: NEUROLOGICAL SURGERY

## 2021-08-01 PROCEDURE — 2580000003 HC RX 258: Performed by: STUDENT IN AN ORGANIZED HEALTH CARE EDUCATION/TRAINING PROGRAM

## 2021-08-01 PROCEDURE — 2580000003 HC RX 258

## 2021-08-01 PROCEDURE — 85025 COMPLETE CBC W/AUTO DIFF WBC: CPT

## 2021-08-01 PROCEDURE — 6360000002 HC RX W HCPCS

## 2021-08-01 PROCEDURE — 6370000000 HC RX 637 (ALT 250 FOR IP): Performed by: NEUROLOGICAL SURGERY

## 2021-08-01 PROCEDURE — 36415 COLL VENOUS BLD VENIPUNCTURE: CPT

## 2021-08-01 PROCEDURE — 2060000000 HC ICU INTERMEDIATE R&B

## 2021-08-01 PROCEDURE — 80069 RENAL FUNCTION PANEL: CPT

## 2021-08-01 PROCEDURE — 94761 N-INVAS EAR/PLS OXIMETRY MLT: CPT

## 2021-08-01 PROCEDURE — 83735 ASSAY OF MAGNESIUM: CPT

## 2021-08-01 PROCEDURE — 94150 VITAL CAPACITY TEST: CPT

## 2021-08-01 PROCEDURE — 2580000003 HC RX 258: Performed by: NEUROLOGICAL SURGERY

## 2021-08-01 PROCEDURE — 80202 ASSAY OF VANCOMYCIN: CPT

## 2021-08-01 PROCEDURE — 6360000002 HC RX W HCPCS: Performed by: STUDENT IN AN ORGANIZED HEALTH CARE EDUCATION/TRAINING PROGRAM

## 2021-08-01 PROCEDURE — 6370000000 HC RX 637 (ALT 250 FOR IP)

## 2021-08-01 RX ADMIN — VANCOMYCIN HYDROCHLORIDE 1250 MG: 10 INJECTION, POWDER, LYOPHILIZED, FOR SOLUTION INTRAVENOUS at 11:43

## 2021-08-01 RX ADMIN — Medication 10 ML: at 22:15

## 2021-08-01 RX ADMIN — DEXAMETHASONE SODIUM PHOSPHATE 4 MG: 4 INJECTION, SOLUTION INTRAMUSCULAR; INTRAVENOUS at 11:07

## 2021-08-01 RX ADMIN — CEFEPIME 2000 MG: 2 INJECTION, POWDER, FOR SOLUTION INTRAMUSCULAR; INTRAVENOUS at 10:10

## 2021-08-01 RX ADMIN — CEFEPIME 2000 MG: 2 INJECTION, POWDER, FOR SOLUTION INTRAMUSCULAR; INTRAVENOUS at 01:45

## 2021-08-01 RX ADMIN — ONDANSETRON 4 MG: 2 INJECTION INTRAMUSCULAR; INTRAVENOUS at 10:15

## 2021-08-01 RX ADMIN — LEVETIRACETAM 500 MG: 100 INJECTION, SOLUTION INTRAVENOUS at 22:10

## 2021-08-01 RX ADMIN — DEXAMETHASONE SODIUM PHOSPHATE 4 MG: 4 INJECTION, SOLUTION INTRAMUSCULAR; INTRAVENOUS at 05:45

## 2021-08-01 RX ADMIN — PANTOPRAZOLE SODIUM 40 MG: 40 TABLET, DELAYED RELEASE ORAL at 06:51

## 2021-08-01 RX ADMIN — DOCUSATE SODIUM 50 MG AND SENNOSIDES 8.6 MG 1 TABLET: 8.6; 5 TABLET, FILM COATED ORAL at 09:34

## 2021-08-01 RX ADMIN — CEFEPIME 2000 MG: 2 INJECTION, POWDER, FOR SOLUTION INTRAMUSCULAR; INTRAVENOUS at 17:08

## 2021-08-01 RX ADMIN — Medication 10 ML: at 09:36

## 2021-08-01 RX ADMIN — VANCOMYCIN HYDROCHLORIDE 1000 MG: 10 INJECTION, POWDER, LYOPHILIZED, FOR SOLUTION INTRAVENOUS at 00:58

## 2021-08-01 RX ADMIN — HYDROMORPHONE HYDROCHLORIDE 1 MG: 1 INJECTION, SOLUTION INTRAMUSCULAR; INTRAVENOUS; SUBCUTANEOUS at 18:20

## 2021-08-01 RX ADMIN — OXYCODONE 10 MG: 5 TABLET ORAL at 22:40

## 2021-08-01 RX ADMIN — VANCOMYCIN HYDROCHLORIDE 1250 MG: 10 INJECTION, POWDER, LYOPHILIZED, FOR SOLUTION INTRAVENOUS at 22:43

## 2021-08-01 RX ADMIN — OXYCODONE 5 MG: 5 TABLET ORAL at 12:07

## 2021-08-01 RX ADMIN — DEXAMETHASONE SODIUM PHOSPHATE 4 MG: 4 INJECTION, SOLUTION INTRAMUSCULAR; INTRAVENOUS at 17:08

## 2021-08-01 RX ADMIN — HYDROMORPHONE HYDROCHLORIDE 0.5 MG: 1 INJECTION, SOLUTION INTRAMUSCULAR; INTRAVENOUS; SUBCUTANEOUS at 07:19

## 2021-08-01 RX ADMIN — POLYETHYLENE GLYCOL 3350 17 G: 17 POWDER, FOR SOLUTION ORAL at 09:43

## 2021-08-01 RX ADMIN — LEVETIRACETAM 500 MG: 100 INJECTION, SOLUTION INTRAVENOUS at 09:34

## 2021-08-01 RX ADMIN — DEXAMETHASONE SODIUM PHOSPHATE 4 MG: 4 INJECTION, SOLUTION INTRAMUSCULAR; INTRAVENOUS at 22:15

## 2021-08-01 ASSESSMENT — PAIN SCALES - GENERAL
PAINLEVEL_OUTOF10: 7
PAINLEVEL_OUTOF10: 7
PAINLEVEL_OUTOF10: 6
PAINLEVEL_OUTOF10: 6
PAINLEVEL_OUTOF10: 0
PAINLEVEL_OUTOF10: 6

## 2021-08-01 ASSESSMENT — PAIN DESCRIPTION - PAIN TYPE
TYPE: SURGICAL PAIN
TYPE: SURGICAL PAIN;ACUTE PAIN
TYPE: ACUTE PAIN;SURGICAL PAIN

## 2021-08-01 ASSESSMENT — PAIN DESCRIPTION - PROGRESSION: CLINICAL_PROGRESSION: NOT CHANGED

## 2021-08-01 ASSESSMENT — PAIN DESCRIPTION - DESCRIPTORS
DESCRIPTORS: HEADACHE
DESCRIPTORS: OTHER (COMMENT)

## 2021-08-01 ASSESSMENT — PAIN DESCRIPTION - ONSET: ONSET: ON-GOING

## 2021-08-01 ASSESSMENT — PAIN DESCRIPTION - FREQUENCY: FREQUENCY: CONTINUOUS

## 2021-08-01 ASSESSMENT — PAIN DESCRIPTION - LOCATION
LOCATION: HEAD

## 2021-08-01 NOTE — PROGRESS NOTES
ICU Progress Note    Admit Date: 7/30/2021  Day: 3  Vent Day: None  IV Access:Peripheral  IV Fluids:None  Vasopressors:None                Antibiotics: None  Diet: ADULT DIET; Dysphagia - Pureed    CC: HA, AMS, slurred speech x1.5 months    Interval history:     - complaining of headache, was given Dilaudid PRN 2x overnight, however states pain improving overall  - AOX2 today, able to state he is at hospital and name as well  - follows commands     HPI: 47 y/o male presenting with 1.5 month history of progressively worsening HA, mental status, slurred speech, and blurry vision. On 7/29 patient had difficulty handling item at dinner table so father brought the patient to ED. CT showed multiple cystic masses w/ minimal thin peripheral vasogenic edema and 7mm midline shift 2/2 mass effect. Early on 7/31 patient under went R frontotemporal craniotomy for evaluation of temporal and parietal cystic lesions and L frontotemporal craniotomy for evaluation of L sided cystic lesions. Patient was admitted to ICU after surgery and was not verbally responsive at that time.     Medications:     Scheduled Meds:   sodium chloride flush  10 mL Intravenous 2 times per day    polyethylene glycol  17 g Oral Daily    sennosides-docusate sodium  1 tablet Oral BID    dexamethasone  4 mg Intravenous Q6H    levetiracetam  500 mg Intravenous BID    vancomycin  1,000 mg Intravenous Q8H    cefepime  2,000 mg Intravenous Q8H    pantoprazole  40 mg Oral QAM AC     Continuous Infusions:   sodium chloride       PRN Meds:sodium chloride flush, sodium chloride, acetaminophen, oxyCODONE **OR** oxyCODONE, HYDROmorphone **OR** HYDROmorphone, naloxone, promethazine **OR** ondansetron, aluminum & magnesium hydroxide-simethicone, bisacodyl, labetalol    Objective:   Vitals:   T-max:  Patient Vitals for the past 8 hrs:   BP Pulse Resp SpO2   07/31/21 2100 110/77 63 16 100 %   07/31/21 2000 -- 74 -- --   07/31/21 1810 -- 61 12 98 %   07/31/21 1800 -- 56 17 --   07/31/21 1710 -- 83 17 100 %   07/31/21 1700 116/80 52 12 91 %   07/31/21 1610 -- 66 9 97 %   07/31/21 1600 110/78 58 11 --   07/31/21 1500 113/84 65 14 --   07/31/21 1400 121/77 57 12 --       Intake/Output Summary (Last 24 hours) at 7/31/2021 2146  Last data filed at 7/31/2021 1800  Gross per 24 hour   Intake 649 ml   Output 2150 ml   Net -1501 ml       Review of Systems   Constitutional: Negative for activity change, appetite change, chills and diaphoresis. HENT: Negative for congestion, postnasal drip and rhinorrhea. Respiratory: Negative for choking, chest tightness and shortness of breath. Cardiovascular: Negative for chest pain, palpitations and leg swelling. Gastrointestinal: Negative for abdominal distention, abdominal pain and constipation. Genitourinary: Positive for discharge. Negative for dysuria, enuresis and flank pain. Musculoskeletal: Negative for gait problem and joint swelling. Skin: Negative for rash and wound. Neurological: Positive for speech difficulty and headaches. Vision changes   Psychiatric/Behavioral: Positive for confusion. Physical Exam  Constitutional:       Appearance: Normal appearance. HENT:      Head:      Comments: S/p L and R frontotemporal craniotomy. Mild R temporal swelling. Nose: No congestion or rhinorrhea. Mouth/Throat:      Mouth: Mucous membranes are moist.   Eyes:      Extraocular Movements: Extraocular movements intact. Pupils: Pupils are equal, round, and reactive to light. Cardiovascular:      Rate and Rhythm: Normal rate and regular rhythm. Pulses: Normal pulses. Heart sounds: Normal heart sounds. Pulmonary:      Effort: Pulmonary effort is normal.      Breath sounds: Normal breath sounds. Abdominal:      General: Abdomen is flat. Bowel sounds are normal.      Palpations: Abdomen is soft. Musculoskeletal:         General: No swelling. Right lower leg: No edema.       Left lower leg: No edema. Neurological:      Mental Status: He is alert. He is disoriented. Comments: Speech difficulty     LABS:    CBC:   Recent Labs     07/30/21 0515 07/30/21 1039 07/31/21  0445   WBC 8.1 8.6 18.8*   HGB 15.7 15.4 14.5   HCT 44.5 43.9 41.9    306 293   MCV 91.7 92.2 92.5     Renal:    Recent Labs     07/30/21 0515 07/30/21 1039 07/31/21 0445    137 139   K 4.2 4.3 4.1    102 106   CO2 23 24 23   BUN 11 13 14   CREATININE 0.8* 0.8* 0.8*   GLUCOSE 144* 132* 140*   CALCIUM 9.2 9.2 8.7   MG 2.60*  --  2.50*   PHOS 3.3  --  3.5   ANIONGAP 11 11 10     Hepatic:   Recent Labs     07/29/21 2048 07/29/21 2048 07/30/21 0515 07/30/21 1039 07/31/21 0445   AST 32  --   --  25  --    ALT 66*  --   --  62*  --    BILITOT 0.6  --   --  0.6  --    PROT 7.1  --   --  6.9  --    LABALBU 4.5   < > 4.2 4.4 3.9   ALKPHOS 65  --   --  64  --     < > = values in this interval not displayed. Troponin:   Recent Labs     07/29/21 2048   TROPONINI <0.01     BNP: No results for input(s): BNP in the last 72 hours. Lipids: No results for input(s): CHOL, HDL in the last 72 hours. Invalid input(s): LDLCALCU, TRIGLYCERIDE  ABGs:  No results for input(s): PHART, AII4WOB, PO2ART, VXH9EVV, BEART, THGBART, W0MCQYAY, XKL0XOY in the last 72 hours. INR:   Recent Labs     07/30/21  1040   INR 1.06     Lactate: No results for input(s): LACTATE in the last 72 hours. Cultures:  -----------------------------------------------------------------  RAD:   MRI BRAIN W WO CONTRAST   Final Result      Postoperative changes of bilateral frontotemporal craniotomy with partial evacuation of the dominant cystic lesions involving the right temporal, right parietal, and left frontal lobes. The other lesions are unchanged. There is stable associated    vasogenic edema. Improved, minimal residual right-to-left midline shift.       Expected postoperative changes of pneumocephalus, minimal hemorrhagic change in the surgical cavity, and trace bilateral cerebral convexity subdural collections. Fluoroscopy modified barium swallow with video   Final Result      As above      CT Head wo Contrast   Final Result   1. Bilateral craniotomy, postsurgical change including pneumocephalus. 2.  No complicating features, hydrocephalus or herniation. CT CHEST ABDOMEN PELVIS WO CONTRAST   Final Result      CHEST:      1. No evidence of metastatic disease in the chest.   2.  No acute intrathoracic abnormality. ABDOMEN/PELVIS:      1. No evidence of metastatic disease in the abdomen and pelvis. 2.  No acute intra-abdominopelvic abnormality. MRI BRAIN W WO CONTRAST   Final Result      1. Bilateral cerebral intra-axial thin ring-enhancing cystic lesions with surrounding vasogenic edema (dominant lesions with incomplete ring enhancement at the cortical gray matter margin) and no internal diffusion restriction. The thin well-defined ring    of enhancement and no internal diffusion restriction raises concern for nonpyrogenic abscesses, such as fungal or paracytic infection. Cystic metastases are a possibility, but considered less likely given the thin well-defined ring enhancement. Incomplete ring of enhancement along the cortical gray matter aspect of the lesions raises the thought of demyelinating disease, but this is unlikely given the hypointense signal on DWI images and the morphology of well-defined thin ring enhancement. 2. Stable mild right subfalcine herniation and uncal herniation                     Assessment/Plan:   Evelio Simon is a [address] [facility] male with a one month hx of HA, blurry vision, and aphasia. CT showed multiple intraparenchymal cysts in brain bilaterally. S/p R and L frontotemporal craniotomy for parietal cystic lesions. 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 CT CAP showed no metastatic disease.  Patient reports being sexually active w/ previous male partners. HIV negative. S/p R and L frontotemporal craniotomy 07/30.  - continue decadron 4 mg Q6, Keppra 500 mg BID  - continue Cef and Vanc  - Q4h neuro exam, keep head of bed > 30 degrees    #Leukocytosis:  WBC today 15.2, prev 18.8. Likely 2/2 steroid initiation and post-surgical inflammation. - continue on Cefepime and Vancomycin      Code Status: Full Code  FEN: Dysphagia Diet, Thin Liquids  PPX: SCD, Pepcid  DISPO: Floor    Sandhya Anguiano MD, PGY-1        This patient has been staffed and discussed with Dr. Obdulia Pierson.

## 2021-08-01 NOTE — PROGRESS NOTES
Clinical Pharmacy Consult Note    46 y.o. male admitted with intraparenchymal cystic lesions. Pharmacy has been asked by Dr. Ricky Valero to adjust all drips to normal saline as appropriate based on compatibility to avoid fluid shifts since D5 is osmotically active. The following intermittent IV drips/infusions have been adjusted to saline:  Cefepime  Levetiracetam  Vancomycin    The following medications must remain in D5W due to incompatibility with normal saline:  Amphotericin  Mycophenolate  Nitroprusside  Penicillin G Potassium    Please be aware that patient may have D5W ordered as part of hypoglycemia orderset. Total IV fluid delivered to patient over last 24h: N/A    RP will follow daily to ensure all new IVPBs + drips are in NS. Please call with questions.   Jana Stringer PharmD, [facility]  Wireless: [phone]  8/1/2021 7:36 AM

## 2021-08-01 NOTE — PROGRESS NOTES
Neurosurgery Progress Note    2021 11:31 AM                               Marne Toure                      LOS: 2 days                POD #12Stereotactic right frontotemporal craniotomy for evacuation of temporal and parietal cystic lesions  2.  Stereotactic left frontotemporal craniotomy for evacuation of frontal cystic lesion preformed      Subjective:  No acute events overnight. Patient has no specific complaints this am.  Pt remains aphasic but there has been mild improvement. HIV was negative                                       Physical Exam:    Temp (24hrs), Av.2 °F (36.8 °C), Min:97.8 °F (36.6 °C), Max:98.6 °F (37 °C)      Neuro Exam  Alert and oriented x 4  PERRL, EOMI, 3->2  mm OU, visual fields grossly intact  Facial expression and sensation symmetric  Tongue and uvula midline  Shoulder shrug symmetric  Neuro Exam  No acute  distress  Dressing clean and dry  Pt is able to follow commands. Pt denies pain. Remains aphasic  Labs:  Recent Labs     21  0844   WBC 15.2*   HGB 14.1   HCT 40.0*          Recent Labs     21  0423      K 4.1      CO2 28   BUN 11   CREATININE 0.8*   GLUCOSE 129*   CALCIUM 9.1   PHOS 3.2   MG 2.60*       Recent Labs     21  1040   PROTIME 12.0   INR 1.06   APTT 33.0     Assessment and Plan:     Patient is a 41 y. o. male w/at least 5 cystic masses in brain who presented with aphasia and headaches x 2 weeks. Pt underwent craniotomy for drainage late Friday. Awaiting cultures.      Plan:  1.    Neuro exams q4   2. For change in exam MUST contact neurosurgery team along with critical care or primary team  5. Cerebral edema:  - Keep Na within normal limits  - Decadron 4 mg Q6H  - Keep HOB >30 degrees  - NO dextrose in IVF's or in IV drips  6. Seizure prophylaxis: Keppra 500mg BID  7. GI Prophylaxis: Pepcid  8. DVT Prophylaxis: SCD's  9. Pain: Managed by medical team   10. Speech consulted for swallow eval, appreciate recs   11.  ID consulted for concern of infectious process    Disp- ok to transfer to the floor     Juan FIGUEROA

## 2021-08-01 NOTE — PROGRESS NOTES
No acute events overnight, VSS. Neuro status slightly improved with pt being able to state name. Answers yes/no orientation questions appropriately, however has difficulty with word finding. Able to ambulate to bathroom with stand by assist and verbal cues. Will continue to monitor.

## 2021-08-01 NOTE — PLAN OF CARE
Care plan reviewed.       Problem: Falls - Risk of:  Goal: Will remain free from falls  Description: Will remain free from falls  Outcome: Ongoing     Problem: Discharge Planning:  Goal: Discharged to appropriate level of care  Description: Discharged to appropriate level of care  Outcome: Ongoing     Problem: Pain:  Goal: Pain level will decrease  Description: Pain level will decrease  Outcome: Ongoing     Problem: Injury - Risk of, Healthcare-Acquired Condition:  Goal: Will remain free from falls  Description: Will remain free from falls  Outcome: Ongoing     Problem: Pain:  Goal: Pain level will decrease  Description: Pain level will decrease  Outcome: Ongoing

## 2021-08-01 NOTE — PROGRESS NOTES
ICU Transfer Note          PGY-1    Hospital Day: 3                                                         Code:Full Code  Admit Date: 7/30/2021                                 PCP: No primary care provider on file. SUBJECTIVE:  Briefly this is a Tino Burton is a 47 y/o male with a one month hx of HA, blurry vision, and aphasia. CT showed multiple intraparenchymal cysts in brain bilaterally. S/p R and L frontotemporal craniotomy for parietal cystic lesions. PHYSICAL EXAM:  /79   Pulse 68   Temp 98.3 °F (36.8 °C) (Oral)   Resp 11   Ht 5' 10\" (1.778 m)   Wt 182 lb 8.7 oz (82.8 kg)   SpO2 93%   BMI 26.19 kg/m²   Physical Exam  Physical Examination:    General appearance: Appears comfortable at rest, found asleep in reclining chair   HEENT: Post-op craniotomy staples seen    Respiratory: Normal respiratory effort. No wheezes, rubs, or crackles   Cardiovascular: regular S1/S2, with no Murmer, rub or gallop.  Abdomen: Soft, non-tender, non-distended   Musculoskeletal: No clubbing, cyanosis, no lower extremity edema, peripheral pulses present, cap refill < 2sec   Neurologic: Cranial nerves grossly intact, PERRL/EOMI, pt is slow to respond to questions about orientation, was not oriented by 4    ASSESSMENT AND PLAN:    Please see progress note dated 8/1/2021 per Dr. Rock Laina Serrato Eth: ADULT DIET; Dysphagia - Pureed  PPx: SCD, Pepcid  CODE: Full Code  DISPO: GMF    The objective and subjective findings as well as the ICU course of treatment have been reviewed with the ICU team. The treatment plan has been reviewed with the ICU team. The patient is being transferred to [place] [address] in stable condition.      Samantha Fry MD  Internal Medicine Resident, PGY-1  8/1/2021  1:18 PM

## 2021-08-01 NOTE — PROGRESS NOTES
and Marina Free the PA in to see the patient. Plan of care discussed. May transfer out of ICU with Q 4 hr neuro checks. Will continue to monitor.

## 2021-08-01 NOTE — PROGRESS NOTES
Patient cried out while this nurse had him up to the bathroom. Motioning toward his head, his fist clenched, his face in a grimace. I assessed his incision, no change from admission, and asked if his head was hurting and he responded with a nod. Given 1 mg dilaudid at this time. Patient in bed, denies further needs. Responding primarily with \"yeah\", \"no\", and \"ok\". Able to follow commands, but exhibits poor safety awareness.

## 2021-08-01 NOTE — PROGRESS NOTES
Spoke with  via perfect serve in regards to incision care, Fran Ruiz stated it was okay to remove the dressing. Will continue to monitor.

## 2021-08-01 NOTE — PROGRESS NOTES
pharmacy!   Esdras Hester PharmD, [facility]  Wireless: [phone]  8/1/2021 9:25 AM

## 2021-08-01 NOTE — PROGRESS NOTES
Report called to [facility] pt transferring to room 5500 . All documented belongings and meds will be packed up and sent with pt.

## 2021-08-01 NOTE — PLAN OF CARE
Problem: Mobility - Impaired:  Goal: Able to ambulate with minimal assistance  Description: Able to ambulate with minimal assistance  Outcome: Met This Shift     Problem: Mobility - Impaired:  Goal: Ability to appropriately use an adaptive device for ambulation will improve  Description: Ability to appropriately use an adaptive device for ambulation will improve  Outcome: Met This Shift     Problem: Falls - Risk of:  Goal: Will remain free from falls  Description: Will remain free from falls  8/1/2021 1622 by Lawson Donahue RN  Outcome: Ongoing     Problem: Falls - Risk of:  Goal: Absence of physical injury  Description: Absence of physical injury  Outcome: Ongoing     Problem: Discharge Planning:  Goal: Discharged to appropriate level of care  Description: Discharged to appropriate level of care  8/1/2021 1622 by Lawson Donahue RN  Outcome: Ongoing     Problem: Verbal Communication - Impaired:  Goal: Functional communication will improve  Description: Functional communication will improve  Outcome: Ongoing     Problem: Verbal Communication - Impaired:  Goal: Ability to interact with others will improve  Description: Ability to interact with others will improve  Outcome: Ongoing     Problem: Verbal Communication - Impaired:  Goal: Ability to establish a method of communication will improve  Description: Ability to establish a method of communication will improve  Outcome: Ongoing     Problem: Pain:  Goal: Pain level will decrease  Description: Pain level will decrease  8/1/2021 1622 by Lawson Donahue RN  Outcome: Ongoing     Problem: Pain:  Goal: Recognizes and communicates pain  Description: Recognizes and communicates pain  Outcome: Ongoing     Problem: Mobility - Impaired:  Goal: Able to ambulate independently  Description: Able to ambulate independently  Outcome: Ongoing     Problem: Mobility - Impaired:  Goal: Able to verbalize acceptance of life and situations over which he or she has no control  Description: Absence of contracture deformity  Outcome: Ongoing     Problem: Autonomic Dysreflexia - Risk of:  Goal: Absence of autonomic dysreflexia signs and symptoms  Description: Absence of autonomic dysreflexia signs and symptoms  Outcome: Ongoing     Problem: Injury - Risk of, Healthcare-Acquired Condition:  Goal: Will remain free from falls  Description: Will remain free from falls  8/1/2021 1622 by Louis Hall RN  Outcome: Ongoing     Problem: Injury - Risk of, Healthcare-Acquired Condition:  Goal: Absence of healthcare acquired conditions  Description: Absence of healthcare acquired conditions  Outcome: Ongoing     Problem: Injury - Risk of, Healthcare-Acquired Condition:  Goal: Absence of pressure ulcer  Description: Absence of pressure ulcer  Outcome: Ongoing     Problem: Injury - Risk of, Healthcare-Acquired Condition:  Goal: Demonstration of wound healing without infection will improve  Description: Demonstration of wound healing without infection will improve  Outcome: Ongoing     Problem: Injury - Risk of, Healthcare-Acquired Condition:  Goal: Absence of urinary tract infection signs and symptoms  Description: Absence of urinary tract infection signs and symptoms  Outcome: Ongoing     Problem: Nutrition Deficit - Risk of:  Goal: Ability to achieve adequate nutritional intake will improve  Description: Ability to achieve adequate nutritional intake will improve  Outcome: Ongoing     Problem: Nutrition Deficit - Risk of:  Goal: Maintenance of adequate weight for body size and type will improve to within specified parameters  Description: Maintenance of adequate weight for body size and type will improve to within specified parameters  Outcome: Ongoing     Problem: Swallowing - Impaired:  Goal: Able to swallow without choking  Description: Able to swallow without choking  Outcome: Ongoing     Problem: Swallowing - Impaired:  Goal: Absence of aspiration  Description: Absence of aspiration  Outcome: Ongoing     Problem: Aspiration - Risk of:  Goal: Absence of aspiration  Description: Absence of aspiration  Outcome: Ongoing     Problem: Respiratory Function - Compromised:  Goal: Absence of pulmonary infection  Description: Absence of pulmonary infection  Outcome: Ongoing     Problem: Respiratory Function - Compromised:  Goal: Levels of oxygenation will improve  Description: Levels of oxygenation will improve  Outcome: Ongoing     Problem: Respiratory Function - Compromised:  Goal: Increase in ventilator-free time  Description: Increase in ventilator-free time  Outcome: Ongoing     Problem: Respiratory Function - Compromised:  Goal: Ability to maintain a clear airway will improve  Description: Ability to maintain a clear airway will improve  Outcome: Ongoing     Problem: Bowel Function - Altered:  Goal: Bowel elimination is within specified parameters  Description: Bowel elimination is within specified parameters  Outcome: Ongoing     Problem: Bowel Function - Altered:  Goal: Control of bowel function will improve  Description: Control of bowel function will improve  Outcome: Ongoing     Problem:  Bowel Function - Altered:  Goal: Ability to maintain normal bowel function will improve  Description: Ability to maintain normal bowel function will improve  Outcome: Ongoing     Problem: Urinary Elimination - Impaired:  Goal: Urinary elimination within specified parameters  Description: Urinary elimination within specified parameters  Outcome: Ongoing     Problem: Urinary Elimination - Impaired:  Goal: Absence of postvoid residual urine  Description: Absence of postvoid residual urine  Outcome: Ongoing     Problem: Urinary Elimination - Impaired:  Goal: Absence of incontinence - Urinary  Description: Absence of incontinence - Urinary  Outcome: Ongoing     Problem: Urinary Elimination - Impaired:  Goal: Reports of episodes of incontinence will decrease - Urinary  Description: Reports of episodes of incontinence will decrease - Urinary  Outcome: Ongoing     Problem: Self-Care Deficit:  Goal: Ability to perform activities of daily living will improve  Description: Ability to perform activities of daily living will improve  Outcome: Ongoing     Problem: Self-Care Deficit:  Goal: Able to perform ADL with assistance  Description: Able to perform ADL with assistance  Outcome: Ongoing     Problem: Self-Care Deficit:  Goal: Ability to communicate needs accurately will improve - ADL needs  Description: Ability to communicate needs accurately will improve - ADL needs  Outcome: Ongoing     Problem: Self-Care Deficit:  Goal: Able to use self-care assistive device appropriately  Description: Able to use self-care assistive device appropriately  Outcome: Ongoing     Problem: Sexual Dysfunction:  Goal: Expressions of positive opinion of body image will increase  Description: Expressions of positive opinion of body image will increase  Outcome: Ongoing     Problem: Psychosocial Distress:  Goal: Absence of psychosocial distress  Description: Absence of psychosocial distress  Outcome: Ongoing     Problem: Psychosocial Distress:  Goal: Ability to cope will improve  Description: Ability to cope will improve  Outcome: Ongoing     Problem: Psychosocial Distress:  Goal: Ability to identify and utilize available support systems will improve  Description: Ability to identify and utilize available support systems will improve  Outcome: Ongoing     Problem: Disruptive Behavior:  Goal: Knowledge of developmental care interventions  Description: Absence of violence  Outcome: Ongoing     Problem: Disruptive Behavior:  Goal: Decrease in feelings of agitation  Description: Decrease in feelings of agitation  Outcome: Ongoing     Problem: Pain:  Description: Pain management should include both nonpharmacologic and pharmacologic interventions.   Goal: Pain level will decrease  Description: Pain level will decrease  8/1/2021 1622 by Eriberto Bell ELLY Hudson  Outcome: Ongoing     Problem: Pain:  Description: Pain management should include both nonpharmacologic and pharmacologic interventions. Goal: Control of acute pain  Description: Control of acute pain  Outcome: Ongoing     Problem: Pain:  Description: Pain management should include both nonpharmacologic and pharmacologic interventions.   Goal: Control of chronic pain  Description: Control of chronic pain  Outcome: Ongoing

## 2021-08-01 NOTE — PROGRESS NOTES
Hospitalist Progress Note      PCP: No primary care provider on file. Date of Admission: 7/30/2021    Chief Complaint: Headache and difficulty speaking    Hospital Course: Patient admitted for multiple intraparenchymal cystic lesions. Status post craniotomy for drainage 07/30. Continue Keppra and Decadron. Cultures pending. Continue IV antibiotics. Subjective: Patient still having difficulty speaking but understand and follow commands. Denies any complaints. Medications:  Reviewed    Infusion Medications    sodium chloride       Scheduled Medications    cefepime  2,000 mg Intravenous Q8H    vancomycin  1,000 mg Intravenous Q8H    sodium chloride flush  10 mL Intravenous 2 times per day    polyethylene glycol  17 g Oral Daily    sennosides-docusate sodium  1 tablet Oral BID    dexamethasone  4 mg Intravenous Q6H    levetiracetam  500 mg Intravenous BID    pantoprazole  40 mg Oral QAM AC     PRN Meds: sodium chloride flush, sodium chloride, acetaminophen, oxyCODONE **OR** oxyCODONE, HYDROmorphone **OR** HYDROmorphone, naloxone, promethazine **OR** ondansetron, aluminum & magnesium hydroxide-simethicone, bisacodyl, labetalol      Intake/Output Summary (Last 24 hours) at 8/1/2021 0820  Last data filed at 8/1/2021 0643  Gross per 24 hour   Intake --   Output 1600 ml   Net -1600 ml       Physical Exam Performed:    /73   Pulse 69   Temp 98.3 °F (36.8 °C) (Oral)   Resp 12   Ht 5' 10\" (1.778 m)   Wt 182 lb 8.7 oz (82.8 kg)   SpO2 93%   BMI 26.19 kg/m²     General appearance: No apparent distress, appears stated age and cooperative. HEENT: Pupils equal, round, and reactive to light. Conjunctivae/corneas clear. Dressing noted  Neck: Supple, with full range of motion. No jugular venous distention. Trachea midline. Respiratory:  Normal respiratory effort. Clear to auscultation, bilaterally without Rales/Wheezes/Rhonchi.   Cardiovascular: Regular rate and rhythm with normal S1/S2 without murmurs, rubs or gallops. Abdomen: Soft, non-tender, non-distended with normal bowel sounds. Musculoskeletal: No clubbing, cyanosis or edema bilaterally. Full range of motion without deformity. Skin: Skin color, texture, turgor normal.  No rashes or lesions. Neurologic: Strength equal in all extremities. Patient able to follow commands. Has difficulty speaking but is able to get some words out. Capillary Refill: Brisk,< 3 seconds   Peripheral Pulses: +2 palpable, equal bilaterally       Labs:   Recent Labs     07/30/21  0515 07/30/21  1039 07/31/21  0445   WBC 8.1 8.6 18.8*   HGB 15.7 15.4 14.5   HCT 44.5 43.9 41.9    306 293     Recent Labs     07/30/21  0515 07/30/21  0515 07/30/21  1039 07/31/21  0445 08/01/21  0423      < > 137 139 142   K 4.2   < > 4.3 4.1 4.1      < > 102 106 104   CO2 23   < > 24 23 28   BUN 11   < > 13 14 11   CREATININE 0.8*   < > 0.8* 0.8* 0.8*   CALCIUM 9.2   < > 9.2 8.7 9.1   PHOS 3.3  --   --  3.5 3.2    < > = values in this interval not displayed. Recent Labs     07/29/21 2048 07/30/21  1039   AST 32 25   ALT 66* 62*   BILITOT 0.6 0.6   ALKPHOS 65 64     Recent Labs     07/30/21  1040   INR 1.06     Recent Labs     07/29/21 2048   TROPONINI <0.01       Urinalysis:      Lab Results   Component Value Date    NITRU Negative 07/29/2021    BLOODU Negative 07/29/2021    SPECGRAV <=1.005 07/29/2021    GLUCOSEU Negative 07/29/2021       Radiology:  MRI BRAIN W WO CONTRAST   Final Result      Postoperative changes of bilateral frontotemporal craniotomy with partial evacuation of the dominant cystic lesions involving the right temporal, right parietal, and left frontal lobes. The other lesions are unchanged. There is stable associated    vasogenic edema. Improved, minimal residual right-to-left midline shift.       Expected postoperative changes of pneumocephalus, minimal hemorrhagic change in the surgical cavity, and trace bilateral cerebral convexity subdural collections. Fluoroscopy modified barium swallow with video   Final Result      As above      CT Head wo Contrast   Final Result   1. Bilateral craniotomy, postsurgical change including pneumocephalus. 2.  No complicating features, hydrocephalus or herniation. CT CHEST ABDOMEN PELVIS WO CONTRAST   Final Result      CHEST:      1. No evidence of metastatic disease in the chest.   2.  No acute intrathoracic abnormality. ABDOMEN/PELVIS:      1. No evidence of metastatic disease in the abdomen and pelvis. 2.  No acute intra-abdominopelvic abnormality. MRI BRAIN W WO CONTRAST   Final Result      1. Bilateral cerebral intra-axial thin ring-enhancing cystic lesions with surrounding vasogenic edema (dominant lesions with incomplete ring enhancement at the cortical gray matter margin) and no internal diffusion restriction. The thin well-defined ring    of enhancement and no internal diffusion restriction raises concern for nonpyrogenic abscesses, such as fungal or paracytic infection. Cystic metastases are a possibility, but considered less likely given the thin well-defined ring enhancement. Incomplete ring of enhancement along the cortical gray matter aspect of the lesions raises the thought of demyelinating disease, but this is unlikely given the hypointense signal on DWI images and the morphology of well-defined thin ring enhancement. 2. Stable mild right subfalcine herniation and uncal herniation                       Assessment/Plan:    Multiple intraparenchymal cystic lesions: Etiology unclear  CT chest abdomen pelvis negative for metastasis  HIV screen negative  Status post craniotomy 07/30/2021  Continue Keppra and Decadron  Monitor blood pressure. Goal systolic blood pressure less than 160 with as needed labetalol  Continue neurochecks  Continue IV antibiotics  Neurosurgery and ID following, appreciate recs    Leucocytosis  Likely sec to steroids.    Cont to monitor      DVT Prophylaxis: SCDs, on hold due to recent surgery  Diet: ADULT DIET;  Dysphagia - Pureed   Code Status: Full Code    PT/OT Eval Status: Ordered    Dispo -pending culture result/antibiotic determination, ID/neurosurgery recs, PT Hope Stark MD

## 2021-08-02 LAB
ALBUMIN SERPL-MCNC: 3.8 G/DL (ref 3.4–5)
ANION GAP SERPL CALCULATED.3IONS-SCNC: 10 MMOL/L (ref 3–16)
BASOPHILS ABSOLUTE: 0 K/UL (ref 0–0.2)
BASOPHILS RELATIVE PERCENT: 0.2 %
BODY FLUID CULTURE, STERILE: ABNORMAL
BUN BLDV-MCNC: 16 MG/DL (ref 7–20)
CALCIUM SERPL-MCNC: 8.9 MG/DL (ref 8.3–10.6)
CHLORIDE BLD-SCNC: 102 MMOL/L (ref 99–110)
CO2: 24 MMOL/L (ref 21–32)
CREAT SERPL-MCNC: 0.7 MG/DL (ref 0.9–1.3)
EOSINOPHILS ABSOLUTE: 0 K/UL (ref 0–0.6)
EOSINOPHILS RELATIVE PERCENT: 0 %
GFR AFRICAN AMERICAN: >60
GFR NON-AFRICAN AMERICAN: >60
GLUCOSE BLD-MCNC: 110 MG/DL (ref 70–99)
GRAM STAIN RESULT: ABNORMAL
HCT VFR BLD CALC: 43.8 % (ref 40.5–52.5)
HEMOGLOBIN: 15 G/DL (ref 13.5–17.5)
IGA: 136 MG/DL (ref 70–400)
IGM: 86 MG/DL (ref 40–230)
LYMPHOCYTES ABSOLUTE: 1.3 K/UL (ref 1–5.1)
LYMPHOCYTES RELATIVE PERCENT: 12.1 %
MAGNESIUM: 2.8 MG/DL (ref 1.8–2.4)
MCH RBC QN AUTO: 32 PG (ref 26–34)
MCHC RBC AUTO-ENTMCNC: 34.2 G/DL (ref 31–36)
MCV RBC AUTO: 93.7 FL (ref 80–100)
MONOCYTES ABSOLUTE: 0.5 K/UL (ref 0–1.3)
MONOCYTES RELATIVE PERCENT: 5.1 %
NEUTROPHILS ABSOLUTE: 8.8 K/UL (ref 1.7–7.7)
NEUTROPHILS RELATIVE PERCENT: 82.6 %
ORGANISM: ABNORMAL
PDW BLD-RTO: 13.3 % (ref 12.4–15.4)
PHOSPHORUS: 3.8 MG/DL (ref 2.5–4.9)
PLATELET # BLD: 287 K/UL (ref 135–450)
PMV BLD AUTO: 8.2 FL (ref 5–10.5)
POTASSIUM SERPL-SCNC: 4.5 MMOL/L (ref 3.5–5.1)
RBC # BLD: 4.67 M/UL (ref 4.2–5.9)
REASON FOR REJECTION: NORMAL
REJECTED TEST: NORMAL
SODIUM BLD-SCNC: 136 MMOL/L (ref 136–145)
VANCOMYCIN TROUGH: 20.5 UG/ML (ref 10–20)
WBC # BLD: 10.6 K/UL (ref 4–11)

## 2021-08-02 PROCEDURE — 80069 RENAL FUNCTION PANEL: CPT

## 2021-08-02 PROCEDURE — 36415 COLL VENOUS BLD VENIPUNCTURE: CPT

## 2021-08-02 PROCEDURE — 83735 ASSAY OF MAGNESIUM: CPT

## 2021-08-02 PROCEDURE — 6370000000 HC RX 637 (ALT 250 FOR IP)

## 2021-08-02 PROCEDURE — 97166 OT EVAL MOD COMPLEX 45 MIN: CPT

## 2021-08-02 PROCEDURE — 97116 GAIT TRAINING THERAPY: CPT

## 2021-08-02 PROCEDURE — 82785 ASSAY OF IGE: CPT

## 2021-08-02 PROCEDURE — 80202 ASSAY OF VANCOMYCIN: CPT

## 2021-08-02 PROCEDURE — 6360000002 HC RX W HCPCS

## 2021-08-02 PROCEDURE — 2580000003 HC RX 258

## 2021-08-02 PROCEDURE — 82784 ASSAY IGA/IGD/IGG/IGM EACH: CPT

## 2021-08-02 PROCEDURE — 99232 SBSQ HOSP IP/OBS MODERATE 35: CPT | Performed by: INTERNAL MEDICINE

## 2021-08-02 PROCEDURE — 6360000002 HC RX W HCPCS: Performed by: STUDENT IN AN ORGANIZED HEALTH CARE EDUCATION/TRAINING PROGRAM

## 2021-08-02 PROCEDURE — 2060000000 HC ICU INTERMEDIATE R&B

## 2021-08-02 PROCEDURE — 97530 THERAPEUTIC ACTIVITIES: CPT

## 2021-08-02 PROCEDURE — 92526 ORAL FUNCTION THERAPY: CPT

## 2021-08-02 PROCEDURE — 97535 SELF CARE MNGMENT TRAINING: CPT

## 2021-08-02 PROCEDURE — 85025 COMPLETE CBC W/AUTO DIFF WBC: CPT

## 2021-08-02 PROCEDURE — 97162 PT EVAL MOD COMPLEX 30 MIN: CPT

## 2021-08-02 PROCEDURE — 92507 TX SP LANG VOICE COMM INDIV: CPT

## 2021-08-02 RX ORDER — HEPARIN SODIUM 5000 [USP'U]/ML
5000 INJECTION, SOLUTION INTRAVENOUS; SUBCUTANEOUS EVERY 8 HOURS SCHEDULED
Status: DISCONTINUED | OUTPATIENT
Start: 2021-08-02 | End: 2021-08-04 | Stop reason: HOSPADM

## 2021-08-02 RX ORDER — LEVETIRACETAM 500 MG/1
500 TABLET ORAL 2 TIMES DAILY
Status: DISCONTINUED | OUTPATIENT
Start: 2021-08-02 | End: 2021-08-04 | Stop reason: HOSPADM

## 2021-08-02 RX ADMIN — DEXAMETHASONE SODIUM PHOSPHATE 4 MG: 4 INJECTION, SOLUTION INTRAMUSCULAR; INTRAVENOUS at 09:57

## 2021-08-02 RX ADMIN — PANTOPRAZOLE SODIUM 40 MG: 40 TABLET, DELAYED RELEASE ORAL at 05:29

## 2021-08-02 RX ADMIN — OXYCODONE 10 MG: 5 TABLET ORAL at 03:34

## 2021-08-02 RX ADMIN — DEXAMETHASONE SODIUM PHOSPHATE 4 MG: 4 INJECTION, SOLUTION INTRAMUSCULAR; INTRAVENOUS at 16:01

## 2021-08-02 RX ADMIN — CEFEPIME 2000 MG: 2 INJECTION, POWDER, FOR SOLUTION INTRAMUSCULAR; INTRAVENOUS at 17:06

## 2021-08-02 RX ADMIN — DOCUSATE SODIUM 50 MG AND SENNOSIDES 8.6 MG 1 TABLET: 8.6; 5 TABLET, FILM COATED ORAL at 09:54

## 2021-08-02 RX ADMIN — OXYCODONE 10 MG: 5 TABLET ORAL at 14:43

## 2021-08-02 RX ADMIN — SODIUM CHLORIDE 25 ML: 9 INJECTION, SOLUTION INTRAVENOUS at 13:15

## 2021-08-02 RX ADMIN — LEVETIRACETAM 500 MG: 100 INJECTION, SOLUTION INTRAVENOUS at 09:46

## 2021-08-02 RX ADMIN — HEPARIN SODIUM 5000 UNITS: 5000 INJECTION INTRAVENOUS; SUBCUTANEOUS at 20:14

## 2021-08-02 RX ADMIN — VANCOMYCIN HYDROCHLORIDE 1250 MG: 10 INJECTION, POWDER, LYOPHILIZED, FOR SOLUTION INTRAVENOUS at 20:15

## 2021-08-02 RX ADMIN — HEPARIN SODIUM 5000 UNITS: 5000 INJECTION INTRAVENOUS; SUBCUTANEOUS at 14:43

## 2021-08-02 RX ADMIN — CEFEPIME 2000 MG: 2 INJECTION, POWDER, FOR SOLUTION INTRAMUSCULAR; INTRAVENOUS at 02:33

## 2021-08-02 RX ADMIN — VANCOMYCIN HYDROCHLORIDE 1250 MG: 10 INJECTION, POWDER, LYOPHILIZED, FOR SOLUTION INTRAVENOUS at 05:30

## 2021-08-02 RX ADMIN — DOCUSATE SODIUM 50 MG AND SENNOSIDES 8.6 MG 1 TABLET: 8.6; 5 TABLET, FILM COATED ORAL at 20:14

## 2021-08-02 RX ADMIN — POLYETHYLENE GLYCOL 3350 17 G: 17 POWDER, FOR SOLUTION ORAL at 09:55

## 2021-08-02 RX ADMIN — VANCOMYCIN HYDROCHLORIDE 1250 MG: 10 INJECTION, POWDER, LYOPHILIZED, FOR SOLUTION INTRAVENOUS at 13:15

## 2021-08-02 RX ADMIN — LEVETIRACETAM 500 MG: 500 TABLET ORAL at 20:14

## 2021-08-02 RX ADMIN — DEXAMETHASONE SODIUM PHOSPHATE 4 MG: 4 INJECTION, SOLUTION INTRAMUSCULAR; INTRAVENOUS at 03:34

## 2021-08-02 RX ADMIN — CEFEPIME 2000 MG: 2 INJECTION, POWDER, FOR SOLUTION INTRAMUSCULAR; INTRAVENOUS at 09:45

## 2021-08-02 ASSESSMENT — PAIN DESCRIPTION - FREQUENCY
FREQUENCY: INTERMITTENT
FREQUENCY: INTERMITTENT

## 2021-08-02 ASSESSMENT — PAIN DESCRIPTION - ONSET
ONSET: ON-GOING
ONSET: ON-GOING

## 2021-08-02 ASSESSMENT — PAIN DESCRIPTION - DESCRIPTORS
DESCRIPTORS: HEADACHE
DESCRIPTORS: HEADACHE

## 2021-08-02 ASSESSMENT — PAIN SCALES - GENERAL
PAINLEVEL_OUTOF10: 0
PAINLEVEL_OUTOF10: 7
PAINLEVEL_OUTOF10: 7

## 2021-08-02 ASSESSMENT — PAIN - FUNCTIONAL ASSESSMENT: PAIN_FUNCTIONAL_ASSESSMENT: PREVENTS OR INTERFERES SOME ACTIVE ACTIVITIES AND ADLS

## 2021-08-02 ASSESSMENT — PAIN DESCRIPTION - PAIN TYPE: TYPE: SURGICAL PAIN

## 2021-08-02 ASSESSMENT — PAIN DESCRIPTION - PROGRESSION: CLINICAL_PROGRESSION: NOT CHANGED

## 2021-08-02 ASSESSMENT — PAIN DESCRIPTION - LOCATION
LOCATION: HEAD
LOCATION: HEAD

## 2021-08-02 NOTE — PROGRESS NOTES
ID Follow-up NOTE  RESIDENT NOTE - reviewed / edited, attending note at bottom    CC:   Multiple brain masses   Antibiotics: Vancomycin, Cefepime    Admit Date: 7/30/2021  Hospital Day: 4    Subjective:     POD#3 for stereotactic right frontotemporal craniotomy for evacuation of temporal and parietal cystic lesions and stereotactic left frontotemporal craniotomy for evacuation of frontal cystic lesion. Surgical pathology and cultures, fungal and AFB sent    Pt sitting comfortably in bed. Having trouble finding words when asked questions. No complaints at this time, No fevers reported, WBC today 10. 6, 15.2 (8/1),18.1 (7/31). On vancomycin and cefepime. Objective:     Patient Vitals for the past 8 hrs:   BP Temp Temp src Pulse Resp SpO2   08/02/21 0700 113/68 98.3 °F (36.8 °C) Oral 58 16 96 %   08/02/21 0230 (!) 102/58 98.3 °F (36.8 °C) Oral 62 16 93 %     I/O last 3 completed shifts: In: 2982 [P.O.:540; I.V.:2442]  Out: 1 [Urine:1]  No intake/output data recorded. EXAM:  GENERAL: No apparent distress.     HEENT: Membranes moist, no oral lesion - craniotomy wound closed  NECK:  Supple, no lymphadenopathy  LUNGS: Clear b/l, no rales, no dullness  CARDIAC: RRR, no murmur appreciated  ABD:  + BS, soft / NT  EXT:  No rash, no edema, no lesions  NEURO: No focal neurologic findings  PSYCH: Orientation, sensorium, mood normal  LINES:  Peripheral iv       Data Review:  Lab Results   Component Value Date    WBC 15.2 (H) 08/01/2021    HGB 14.1 08/01/2021    HCT 40.0 (L) 08/01/2021    MCV 91.2 08/01/2021     08/01/2021     Lab Results   Component Value Date    CREATININE 0.7 (L) 08/02/2021    BUN 16 08/02/2021     08/02/2021    K 4.5 08/02/2021     08/02/2021    CO2 24 08/02/2021       Hepatic Function Panel:   Lab Results   Component Value Date    ALKPHOS 64 07/30/2021    ALT 62 07/30/2021    AST 25 07/30/2021    PROT 6.9 07/30/2021    BILITOT 0.6 07/30/2021    LABALBU 3.8 08/02/2021 MICRO:    Surgical culture: No WBC or organisms seen, NGTD  Tissue culture: No WBC or organisms seen, NGTD  Body fluid culture: NGTD  AFB culture: no AFB by fluorescent stain  Fungus culture Head, body fluid, tissue: pending  HIV: non-reactive  Hepatitis Group: non reactive    IMAGIN/31 MRI BRAIN W WO CONTRAST   Postoperative changes of bilateral frontotemporal craniotomy with partial evacuation of the dominant cystic lesions involving the right temporal, right parietal, and left frontal lobes. The other lesions are unchanged. There is stable associated   vasogenic edema.     Improved, minimal residual right-to-left midline shift.     Expected postoperative changes of pneumocephalus, minimal hemorrhagic change in the surgical cavity, and trace bilateral cerebral convexity subdural collections.  CT HEAD WO CONTRAST  1.  Bilateral craniotomy, postsurgical change including pneumocephalus. 2.  No complicating features, hydrocephalus or herniation.  MRI BRIAIN W WO CONTRAST  1. Bilateral cerebral intra-axial thin ring-enhancing cystic lesions with surrounding vasogenic edema (dominant lesions with incomplete ring enhancement at the cortical gray matter margin) and no internal diffusion restriction. The thin well-defined ring of enhancement and no internal diffusion restriction raises concern for nonpyrogenic abscesses, such as fungal or paracytic infection. Cystic metastases are a possibility, but considered less likely given the thin well-defined ring enhancement. Incomplete ring of enhancement along the cortical gray matter aspect of the lesions raises the thought of demyelinating disease, but this is unlikely given the hypointense signal on DWI images and the morphology of well-defined thin ring enhancement.   2. Stable mild right subfalcine herniation and uncal herniation     CT CHEST ABDOMEN PELVIS  CHEST:  1.  No evidence of metastatic disease in the chest.  2.  No acute intrathoracic abnormality. ABDOMEN/PELVIS:  1.  No evidence of metastatic disease in the abdomen and pelvis. 2.  No acute intra-abdominopelvic abnormality. 7/29 CT HEAD WO CONTRAST  1. At least 5 cystic masses in the bilateral cerebral hemispheres demonstrate  minimal thin peripheral enhancement.  Surrounding vasogenic edema and mass  effect with right to left midline shift measuring 7 mm and diffuse sulcal  effacement throughout the bilateral cerebral hemispheres.  Crowding of the  right quadrigeminal and ambient cisterns.  These masses may represent  intracranial metastatic disease.  Additional considerations include  neurocysticercosis and cerebral abscesses in the appropriate clinical  setting.  Tumefactive demyelination is considered less likely but difficult  to entirely exclude.  Recommend follow-up brain MRI with and without  intravenous contrast for further evaluation.  Additionally, follow-up CT  chest, abdomen and pelvis may be helpful. 7/29 CTA HEAD NECK W CONTRAST  2. No acute abnormality in the large arteries of the head and neck.  No focal  hemodynamically significant stenosis or occlusion.       Scheduled Meds:   cefepime  2,000 mg Intravenous Q8H    vancomycin  1,250 mg Intravenous Q8H    sodium chloride flush  10 mL Intravenous 2 times per day    polyethylene glycol  17 g Oral Daily    sennosides-docusate sodium  1 tablet Oral BID    dexamethasone  4 mg Intravenous Q6H    levetiracetam  500 mg Intravenous BID    pantoprazole  40 mg Oral QAM AC       Continuous Infusions:   sodium chloride         PRN Meds:  sodium chloride flush, sodium chloride, acetaminophen, oxyCODONE **OR** oxyCODONE, naloxone, promethazine **OR** ondansetron, aluminum & magnesium hydroxide-simethicone, bisacodyl, labetalol      Assessment:     Ignacio Pulido is a 46 y. o. male with no PMH who presents to the emergency department brought in by his father with c/o AMS and speech difficulty.  For the last 1.5 months  Possible brain abscess vs metastasis, pt has negative metastasis CT chest, abd, pelvis  Pathogens include  -neurocysticercosis  -toxoplasmosis  -actinomyces, nocardia  -fungal infections, aspergillosis  -primary CNS lymphoma  Patient has no indication of immunocompromised, HIV non-reactive, Hepatitis panel non-reactive. Plan:     -cont vancomycin and cefepime (Day 3)  -follow up cultures and path    Discussed with Dr. Mona Mehta MD,PGY-1    Addendum to Resident Progress note:  Pt seen, examined and evaluated. I have reviewed the current history, physical findings, labs and assessment and plan and agree with note as documented by resident (Dr. Nahomi Olivo).     47 yo man with no known PMH.  + cig use      Pt reports 1-2 mo difficulty with speech, unable to find words, slurred speech. He has had blurred vision, bilateral.    Less awake / alert over 1-2 days  He developed inability to hold a cup / plate with his R hand on 7/29 and then presented to [facility] ED     In ED, afeb. Abnormal CT with mult cystic cerebral lesions  Transferred and admitted to [facility] on 7/30  Had MRI - mult cystic cerebral lesions with edema   s/p brain biopsy 7/30      HIV screen neg    IMP/  CNS lesions,    5 separate lesions      concern infection though not typical for bacterial ella abscesse              No hx or indication of immunocompromised               No hx or known primary cancer (chest / abd / pelvic CT neg)    POD#3 drainage - \"cyst was extensively loculated with numerous membranes lined with necrotic appearing whitish material\"     REC/  Obtain CD4 count   Obtain immunoglobins  Await path - reviewed case and discussed with Maryann Ochoa (requested special stains)    Medical Decision Making:   The following items were considered in medical decision making:  Discussion of patient care with other providers  Reviewed clinical lab tests  Reviewed radiology tests  Reviewed other diagnostic

## 2021-08-02 NOTE — PROGRESS NOTES
Pt is alert and oriented to self only. VSS, pain med given per MAR.all fall precaution is in place. Will continue to monitor.

## 2021-08-02 NOTE — CARE COORDINATION
Case Management Assessment           Initial Evaluation                Date / Time of Evaluation: 8/2/2021 5:02 PM                 Assessment Completed by: PAOLA Greene    Patient Name: Miguel Hdez     YOB: 1980  Diagnosis: Brain mass [G93.89]     Date / Time: 7/30/2021  3:58 AM    Patient Admission Status: Inpatient    If patient is discharged prior to next notation, then this note serves as note for discharge by case management. Current PCP: No primary care provider on file. Clinic Patient: No    Chart Reviewed: Yes  Patient/ Family Interviewed: Yes    Initial assessment completed at bedside with: Patient     Hospitalization in the last 30 days: No    Emergency Contacts:  Extended Emergency Contact Information  Primary Emergency Contact: andreina paiz  Address: [address] [place] [address] Phone: [phone]  Mobile Phone: [phone]  Relation: Parent   needed? No    Advance Directives:   Code Status: Full Code    Healthcare Power of : Yes  Agent: Father  Contact Number: see above     Financial:  Payor: Caroline Zarate / Plan: Judie Pizano / Product Type: *No Product type* /     Pre-cert required for SNF: Yes    Pharmacy:  No Pharmacies Listed    Potential assistance Purchasing Medications: Potential Assistance Purchasing Medications: No  Does Patient want to participate in local refill/ meds to beds program?: Not Assessed    Meds To Beds General Rules:  1. Can ONLY be done Monday- Friday between 8:30am-5pm  2. Prescription(s) must be in pharmacy by 3pm to be filled same day  3. Copy of patient's insurance/ prescription drug card and patient face sheet must be sent along with the prescription(s)  4. Cost of Rx cannot be added to hospital bill.  If financial assistance is needed, please contact unit  or ;  or  CANNOT provide pharmacy voucher for patients co-pays  5. Patients can then  the prescription on their way out of the hospital at discharge, or pharmacy can deliver to the bedside if staff is available. (payment due at time of pick-up or delivery - cash, check, or card accepted)     Able to afford home medications/ co-pay costs: Yes    ADLS:  Support Systems: Parent    PT AM-PAC: 19 /24  OT AM-PAC: 19 /24    Housing:  Home Environment: home w/father   Steps: yes     Plans to RETURN to current housing: Yes  Barrier(s) to RETURNING to current housing: none     Home Care Information:  Currently ACTIVE with "[employer] Way: No  Home Care Agency: Not Applicable    Currently ACTIVE with [place] on Aging: No      Durable Medical Equipment:  DME Provider: N/A   Equipment: Rolling Walker, cane     Home Oxygen and Respiratory Equipment:  Has HOME OXYGEN prior to admission: No    Dialysis:  Active with HD/PD prior to admission: No    DISCHARGE PLAN:  Disposition: Home with possible home health care     Transportation PLAN for discharge: family     Factors facilitating achievement of predicted outcomes: Family support    Barriers to discharge: final medicaiton/medical needs     Additional Case Management Notes:   SW met with patient at bedside. Patient is from home where he resides with his father. Patient has assistance from father in completing ALD's. And meeting needs. Patient has access to [employer], rolling walker, and cane at home. Father works from home and can provide support all but one day a week. Patient plans to return home at discharge. Patient continues on IV ABX at this time pending cultures. SW to follow for final recommendations.      The Plan for Transition of Care is related to the following treatment goals Brain mass [G93.89]      The Patient and/or patient representative  was provided with a choice of provider and agrees with the discharge plan Not Indicated    Freedom of choice list was provided with basic dialogue that supports the patient's individualized plan of care/goals and shares the quality data associated with the providers.  Not Indicated    Care Transition patient: PAOLA Peña  [facility]   Case Management Department  Ph: [phone]

## 2021-08-02 NOTE — PLAN OF CARE
Problem: Falls - Risk of:  Goal: Will remain free from falls  Description: Will remain free from falls  8/2/2021 0256 by Richar White RN  Outcome: Ongoing  Patient remains free from falls during this shift. Patient is up x1 person assist. Bed is in the lowest position and the bed and chair alarm is activated. Anti-slip socks are on. Call light is within reach. Will continue to monitor and reassess. Problem: Verbal Communication - Impaired:  Goal: Functional communication will improve  Description: Functional communication will improve  8/2/2021 0256 by Richar White RN  Outcome: Ongoing  Patient continues to have expressive aphasia but is able to communicate needs. Will continue to monitor and reassess. Problem: Pain:  Goal: Pain level will decrease  Description: Pain level will decrease  8/2/2021 0256 by Richar White RN  Outcome: Ongoing  RN assesses pain using 0-10 scale. Patient understands how to rate pain using 0-10 scale. Pain is controled with medication per MAR. RN encourages patient to call out for breakthrough pain. Will continue to monitor and reassess.

## 2021-08-02 NOTE — PROGRESS NOTES
chloride        Antibiotics   Recent Abx Admin                   cefepime (MAXIPIME) 2000 mg IVPB minibag in NS (mg) 2,000 mg New Bag 08/02/21 0945     2,000 mg New Bag  0233     2,000 mg New Bag 08/01/21 1708     2,000 mg New Bag  1010    vancomycin (VANCOCIN) 1,250 mg in sodium chloride 0.9 % 250 mL IVPB (mg) 1,250 mg New Bag 08/02/21 0530     1,250 mg New Bag 08/01/21 2243     1,250 mg New Bag  1143                 Neurologic Exam:  Mental status: awake, delayed responses to questions, patient able to follow all commands     Cranial Nerves:  II: Visual acuity not tested  III, IV, VI: PERRL, 3 mm bilaterally, EOMI, no nystagmus noted  V: Facial sensation intact bilaterally to touch  VII: Face symmetric  VIII: Hearing intact bilaterally to spoken voice  IX: Palate movement equal bilaterally  XI: Shoulder shrug equal bilaterally  XII: Tongue midline    Musculoskeletal:   Gait: Not tested   Tone: normal  Sensory: intact to all extremities  Motor strength:    Right  Left    Right  Left    Deltoid  5 5  Hip Flex  5 5   Biceps  5 5  Knee Extensors  5 5   Triceps  5 5  Knee Flexors  5 5   Wrist Ext  5 5  Ankle Dorsiflex. 5 5   Wrist Flex  5 5  Ankle Plantarflex. 5 5   Handgrip  5 5  Ext Jus Longus  5 5   Thumb Ext  5 5         Incision: intact, clean and dry, open to air      Respiratory:  Unlabored respiratory pattern    Abdomen:   Soft, ND     Cardiovascular:  Warm, well perfused    Assessment   Patient is a 47 yo male s/p Procedure(s) (LRB):  RIGHT FRONTALTEMPORAL CRANIOTOMY FOR EVACUATION OF COLLECTONS AND DIAGNOSIS: LEFT PARIETAL CRANIOTOMY FOR EVACUATION OF COLLECTIONS (Right) per Dr. Maida Mccarthy. Pt father educated from \"Your Guide to Neurosurgery\" book pg 33-38. Plan:  1. Neurologic exam frequency:Q4H  2. Mobility:PT/OT, OOB as tolerated   3. Steroids: Decadron 4mg Q6H  4. Seizure Prophylaxis: Keppra 500mg BID  5. Diet: per primary team   6.  Antibiotics: Cefepime Q8H, Vancomycin Q8H, Infectious Disease consulted-appreciate recs   7. DVT Prophylaxis: SCDs   8. GI Prophylaxis: Protonix  9. Bowel Regimen: Glycolax, Senna  10. Pain control: PRN Tylenol, Oxycodone  11. Incisional Care: RICARDO  12. Dispo Planning: pending cytology results     Patient was discussed with Dr. Diana Monzon who agrees with above assessment and plan.      Electronically signed by: Jaison Cherry, 8/2/2021 9:52 AM   Neurosurgery Nurse Practitioner Student   [phone]

## 2021-08-02 NOTE — PLAN OF CARE
Problem: Falls - Risk of:  Goal: Will remain free from falls  Description: Will remain free from falls  8/2/2021 0759 by Yadi Morales RN  Outcome: Ongoing   Fall risk precaution in place. Bed is locked and in lowest position. 2/4 side rails are up. Call light with in reach. Fall risk bracelet in place, non slip socks on.frequent check on patient. free from falls at this time. will continue to monitor.        Problem: Pain:  Goal: Pain level will decrease  Description: Pain level will decrease  8/2/2021 0759 by Yadi Morlaes RN  Outcome: Ongoing    Problem: Mobility - Impaired:  Goal: Able to ambulate independently  Description: Able to ambulate independently  Outcome: Ongoing Bryan Blank

## 2021-08-02 NOTE — PLAN OF CARE
Increase independence in ADLs and increase activity tolerance in order to return to baseline level of functioning.

## 2021-08-02 NOTE — PROGRESS NOTES
Devices  Safety Devices in place: Yes  Type of devices: Call light within reach; Chair alarm in place; Left in chair;Nurse notified           Patient Diagnosis(es): There were no encounter diagnoses. has no past medical history on file. has a past surgical history that includes craniotomy (Right, 7/30/2021). Treatment Diagnosis: impaired ADLs and functional transfers s/p craniotomy      Restrictions  Position Activity Restriction  Other position/activity restrictions: seizure precautions, up as tolerated    Subjective   General  Chart Reviewed: Yes  Additional Pertinent Hx: 47 yo male admit to ED 7/30 p/w worsening aphasia and headaches. CT Head (+) multiple cystic massess and vasogenic edema. Undergoing R/L craniotomy for evacuation on 7/30. PMHx: none listed  Family / Caregiver Present: No  Referring Practitioner: Tl Gardiner MD/  Diagnosis: brain cyst  Subjective  Subjective: Patient in bed upon arrival, agreeable to OT evaluation. Social/Functional History  Social/Functional History  Lives With:  (chart review indicates he lives with parents)  Type of Home: House  Home Layout: Two level, Bed/Bath upstairs, 1/2 bath on main level  Home Access: Stairs to enter with rails  Entrance Stairs - Number of Steps: \"not sure\" regarding number of steps  Bathroom Shower/Tub: Tub/Shower unit  Bathroom Toilet: Standard  Bathroom Equipment:  (none)  Home Equipment: Rolling walker, Cane (equipment belongs to dad, but is not currently being used)  ADL Assistance: Independent  Homemaking Assistance: Independent  Homemaking Responsibilities: Yes (\"not right now, I don't\" but unable to specify assist)  Ambulation Assistance: Independent  Transfer Assistance: Independent  Active : Yes  Occupation: Full time employment  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  All information obtained per patient       Objective   Vision: Within Functional Limits  Hearing: Within functional limits    Orientation  Overall Orientation Status:  (limited assessment d/t aphasia; able to state name is Ida Amato but unable to generate last name despite cueing and prompting)  Functional Mobility  Functional - Mobility Device: No device  Activity: To/from bathroom (+brief distance in hallway)  Assist Level: Contact guard assistance  Functional Mobility Comments: slightly unsteady particularly with turns, impulsive at times with cues to slow pace  Toilet Transfers  Equipment Used: Standard toilet  Toilet Transfer: Contact guard assistance  Toilet Transfers Comments: steadying assist required with descent d/t decreased body positioning to toilet  ADL  Grooming: Stand by assistance (oral care in stance at sink; noted decreased secretion management and decreased oral control throughout task)  LE Dressing: Verbal cueing;Contact guard assistance (v/c for proper alignment/positioning. Steadying assist to pull up over hips)  Toileting:  (denied need)  Bed mobility  Supine to Sit: Supervision (HOB elevated)  Transfers  Sit to stand: Stand by assistance (slightly impulsive, cues for safe technique)  Stand to sit: Contact guard assistance (v/c for improved alignment to chair prior to beginning descent)  Cognition  Overall Cognitive Status: Exceptions  Arousal/Alertness: Appropriate responses to stimuli  Following Commands: Follows one step commands consistently  Attention Span: Difficulty dividing attention; Attends with cues to redirect  Safety Judgement: Decreased awareness of need for safety  Cognition Comment: limited by aphasia, impulsive and decreased safety awareness      Plan   Plan  Times per week: 5-7  Times per day: Daily  Current Treatment Recommendations: Strengthening, Functional Mobility Training, Balance Training, Endurance Training, Safety Education & Training, Self-Care / ADL, Patient/Caregiver Education & Training, Equipment Evaluation, Education, & procurement, Home Management Training    AM-PAC Score        AM-PAC Inpatient Daily Activity Raw Score: 19 (08/02/21 0931)  AM-PAC Inpatient ADL T-Scale Score : 40.22 (08/02/21 0931)  ADL Inpatient CMS 0-100% Score: 42.8 (08/02/21 0931)  ADL Inpatient CMS G-Code Modifier : CK (08/02/21 0931)    Goals  Short term goals  Time Frame for Short term goals: by d/c  Short term goal 1: Patient will complete LB dressing with SPVN and no v/c for optimal alignment  Short term goal 2: Patient will complete functional transfers, including commode transfer, with MOD I  Short term goal 3: Patient will complete functional mobility for item retrieval x5 with SPVN       Therapy Time   Individual Concurrent Group Co-treatment   Time In 0810         Time Out 0835         Minutes 0942 [place], OTR/L #[id number]

## 2021-08-02 NOTE — PROGRESS NOTES
Speech Language Pathology  Facility/Department: [facility]  Speech Language Daily Treatment Note  NAME: Svetlana Dueñas  : 1980  MRN: [id number]    Date of Eval: 2021  Evaluating Therapist: JUSTINE Reinoso    Patient Diagnosis(es): has Brain cyst; Somnolence; and Dysphasia on their problem list.      Chart reviewed. Pain: stated \"ow\" x 1 but then denied pain    Medical diagnosis: brain masses  Treatment diagnosis: aphasia, apraxia    Subjective: Pt in chair upon entry, holding up RUE and inspecting IV. Pt denied discomfort or irritation at IV site but continued to direct visual attention to IV, requiring cues from SLP to attend to session. Treatment:  Pt seen to address the following goals:  Goal 1: Pt will verbalize personal information with mod cues. 8/2: Independent with first name, required sentence completion task for last name. After this, pt was dependent to state first + last name with anticipatory coarticulation noted and attempts at self-correction phonemic errors. Continue goal.     Goal 2: Pt will complete automatic speech tasks with 50% accuracy or mod cues. 8/2: MAX cues for 50% accuracy with counting 1-10. Limited by reduced initiation / propulsion through task. Continue goal.    Goal 3: Pt will demonstrate comprehension of mildly complex verbal information with 80% accuracy or min cues. 8/2: Goal not targeted. Continue goal.    Goal 4: Pt will participate in ongoing cognitive linguistic assessment. 8/2: Pt continued with persistent distant gaze to R and open mouth posturing but improved responsiveness to visually attend to SLP when cued. Trismus noted with reduced mandibular movement during speech tasks, limiting intelligibility at times. Inconsistent responsiveness to cues for increased ROM. Continue goal.    Education:  Educated to role of SLP, aphasia, apraxia, skills targeted, and importance of repetition.     Assessment: Aphasia, apraxia of speech, cognitive linguistic impairment. Continues with flat affect, slowed initiation, and reduced verbal output. Benefits from phonemic cues. Speech intelligibility limited at times by trismus and reduced movement of articulators. Plan:  Continue speech/language therapy to address above goals, 3-5 x/week x LOS  DC recommendations: ongoing ST recommended    D/W nursing and Dr. Low Freeman met prior to leaving room, call button in reach. RN and Dr. Xuan Faustin at side. Treatment time: 15 min        Tabitha Del Real MA CCC-SLP; [id number]  Speech-Language Pathologist  Pager # [phone]  Phone # [phone]  Office # [phone]    If patient is discharged prior to next session, this note will serve as discharge summary.

## 2021-08-02 NOTE — PROGRESS NOTES
Clinical Pharmacy Progress Note    46 y.o. male admitted with intraparenchymal cystic lesions. Pharmacy has been asked by Dr. Tracie Cline to adjust all drips to normal saline as appropriate based on compatibility to avoid fluid shifts since D5 is osmotically active. The following intermittent IV drips/infusions have been adjusted to saline:  Cefepime  Levetiracetam  Vancomycin    Total IV fluid delivered to patient over last 24h: 2982 mL    RPh will follow daily to ensure all new IVPBs + drips are in NS. Please call with questions.   Roverto Humphries, PharmD, [facility]  Wireless: [id number]   8/2/2021 11:16 AM

## 2021-08-02 NOTE — PROGRESS NOTES
Speech Language Pathology  Facility/Department: [facility] 5T ORTHO/NEURO  Dysphagia Daily Treatment Note    NAME: Kiana Freeman  : 1980  MRN: [id number]    Patient Diagnosis(es):   Patient Active Problem List    Diagnosis Date Noted    Brain mass     Somnolence 2021    Dysphasia 2021    Brain cyst 2021     Allergies: No Known Allergies    RECENT RESULTS MRI BRAIN (21)  1. Bilateral cerebral intra-axial thin ring-enhancing cystic lesions with surrounding vasogenic edema (dominant lesions with incomplete ring enhancement at the cortical gray matter margin) and no internal diffusion restriction. The thin well-defined ring    of enhancement and no internal diffusion restriction raises concern for nonpyrogenic abscesses, such as fungal or paracytic infection. Cystic metastases are a possibility, but considered less likely given the thin well-defined ring enhancement. Incomplete ring of enhancement along the cortical gray matter aspect of the lesions raises the thought of demyelinating disease, but this is unlikely given the hypointense signal on DWI images and the morphology of well-defined thin ring enhancement. 2. Stable mild right subfalcine herniation and uncal herniation            RECENT RESULTS  CT OF HEAD (21)  1.  Bilateral craniotomy, postsurgical change including pneumocephalus. 2.  No complicating features, hydrocephalus or herniation. Repeat MRI 21     Postoperative changes of bilateral frontotemporal craniotomy with partial evacuation of the dominant cystic lesions involving the right temporal, right parietal, and left frontal lobes. The other lesions are unchanged.  There is stable associated    vasogenic edema.       Improved, minimal residual right-to-left midline shift.       Expected postoperative changes of pneumocephalus, minimal hemorrhagic change in the surgical cavity, and trace bilateral cerebral convexity subdural collections.     Previous MBS - n/a    Chart reviewed. Medical Diagnosis: brain mass  Treatment Diagnosis: dysphagia    BSE Impression 7/31/21  Oral dysphagia with reduced labial seal resulting in persistent anterior loss on R with all consistencies. Prolonged posterior transit of all with intermittent bolus holding noted. Pooling of secretions noted in R buccal cavity and mild oral residue on R with puree solids. Solids beyond puree not trialed d/t apparent odynophagia and need for instrumental assessment. No overt s/s associated with aspiration exhibited with any consistency trialed but unable to fully evaluate as pt did not follow directions for 3 oz water test and did not consistently vocalize to assess for wet quality. Recommend MBS for full assessment of oral and pharyngeal phases.       MBS results 7/31/21  Oral Phase: Oral phase marked by reduced labial seal with anterior loss on R with all liquids, reduced bolus control with larger amounts resulting in premature spillage over base of tongue, slowed posterior propulsion, and R buccal residue with all consistencies. Pt with severely impaired mastication of regular solid, initially just holding bolus on lingual surface with no initiation to masticate. Pt then demonstrated slowed, laborious, and inefficient masticatory pattern with facial grimacing - endorsed pain when questioned. Minimal recollection of solid bolus occurred with piecemeal swallowing and liquid rinse required to fully clear. Pharyngeal: Delayed swallow initiation placing pt at risk of aspiration with larger liquid boluses. Due to posterior spillage over base of tongue + delayed initiation, pt with trace aspiration of thin liquids via cup edge prior to the swallow x 1 and moderately deep penetration of nectar thick liquids via cup edge x 1.  Pt with delayed, weak throat clear in response to aspiration in addition to grimacing and pointing to chest. No penetration or aspiration observed with small boluses of liquids (via tsp or cued for small sip via cup and straw) or solid textures. Instance of reduced laryngeal elevation resulted in penetrated material from nectar thick liquids not fully clearing from laryngeal vestibule immediately but eventually cleared with subsequent swallows. Intermittent vallecular residue in between swallows d/t spillover from oral cavity. Adequate pharyngeal stripping wave with no significant pharyngeal residue after the swallow. Upper Esophageal Screen: Pt with delayed belching and hiccuping after completion of procedure. Pain: denies    Current Diet : puree/thin liquids    Treatment:  Pt seen bedside to address the following goals:  Goal 1: Pt will consume PO with <3 instances of anterior loss on R x 2 sessions. 8/2: no anterior loss of bolus presented via tsp/cup and straw  Cont goal    Goal 2: Pt will participate in MBS procedure. 7/31 (s/p MBS): GOAL MET     New goals:  Goal 3: Pt will consume recommended diet consistency w/o respiratory decline or overt s/s associated with aspiration. 8/2: no respiratory decline per chart review, no concerns per RN. Pt consumed liquids via tsp,  cup and straw with no overt signs of aspiration, voice remained clear. Pt completed 18 repetitions of effortful swallow after ice chips and tsps of water. Pt encouraged to complete throughout the day  Goal met, cont to ensure consistency    Goal 4: Pt will consume soft solids with adequate mastication and min oral residue x 2 sessions. 8/2: pt analyzed with small piece of malka cracker dipped in applesauce to soften slightly. Pt demonstrated prolonged mastication, intermittent holding requiring cues to cont chewing. Pt cont with grimacing and report of pain (pointing to jaw/neck area) when chewing. This was noted in prior sessions as well. Portion of bolus remained, required increased cues to cont to masticate with liquid wash then clearing.   Cont goal     Patient/Family/Caregiver Education:  Pt and father educated to purpose of tasks presented and instruction to complete effortful swallow independently throughout the day. Compensatory Strategies:  Supervision: 1:1 (will require d/t cognitive linguistic deficits - cues for small sips of liquids)  Upright as possible for all oral intake  Check for pocketing of food   Alternate solids and liquids  Remain upright for 30-45 minutes after meals  Small bites/sips        Plan:  Continued daily Dysphagia treatment with goals per  plan of care. Diet recommendations:cont puree/thin liquids  DC recommendation: ongoing treatment indicated  Treatment: 15  D/W nursing [facility]  Needs met prior to leaving room, call button in reach. Babatunde Eller M.S./[facility] #[id number]  Pg.  # [phone]  If patient is discharged prior to next treatment, this note will serve as the discharge summary

## 2021-08-02 NOTE — PROGRESS NOTES
Physical Therapy    Facility/Department: [facility]  Initial Assessment and Treatment    NAME: Farnaz Alcala  : 1980  MRN: [id number]    Date of Service: 2021    Discharge Recommendations:    Farnaz Alcala scored a  19/24 on the AM-PAC short mobility form. Current research shows that an AM-PAC score of 18 or greater is typically associated with a discharge to the patient's home setting. Based on the patient's AM-PAC score and their current functional mobility deficits, it is recommended that the patient have 2-3 sessions per week of Physical Therapy at d/c to increase the patient's independence. At this time, this patient demonstrates the endurance and safety to discharge home with home services) and a follow up treatment frequency of 2-3x/wk. Please see assessment section for further patient specific details. If patient discharges prior to next session this note will serve as a discharge summary. Please see below for the latest assessment towards goals. PT Equipment Recommendations  Equipment Needed: Yes (Pt may need a cane)    Assessment   Assessment: Pt is 45 yo M with decreased dynamic balance and presence of expressive aphasia. Pt following most simple commands. Pt's father present and answering questions about home information. No DME needs. Rec home with 24 hour assist and home PT. Will follow. Treatment Diagnosis: Decreased dynamic balance  Prognosis: Good  Decision Making: Medium Complexity  PT Education: PT Role;General Safety  Patient Education: Pt verbalized understanding  REQUIRES PT FOLLOW UP: Yes  Activity Tolerance  Activity Tolerance: Patient Tolerated treatment well       Patient Diagnosis(es): There were no encounter diagnoses. has no past medical history on file. has a past surgical history that includes craniotomy (Right, 2021).     Restrictions  Position Activity Restriction  Other position/activity restrictions: seizure precautions, up as tolerated     Vision/Hearing   Difficult to assess due to aphasia    Subjective  General  Chart Reviewed: Yes  Additional Pertinent Hx: 47 yo male admit to ED 7/30 p/w worsening aphasia and headaches. CT Head (+) multiple cystic massess and vasogenic edema. Undergoing R/L craniotomy for evacuation on 7/30. PMHx: none listed  Family / Caregiver Present: Yes (Father)  Referring Practitioner: Dr. Cyn Pennington  Diagnosis: Brain mass  Subjective  Subjective: Pt sitting at EOB and requesting to use bathroom per nursing  Pain Screening  Patient Currently in Pain: Denies     Orientation  Orientation  Overall Orientation Status:  (Difficult to assess due to expressive aphasia)     Social/Functional History  Social/Functional History  Lives With:  (With father)  Type of Home: House  Home Layout: Two level (Bedroom upstairs and bathroom on 1st floor)  Home Access: Stairs to enter with rails  Entrance Stairs - Number of Steps: 5  Bathroom Shower/Tub: Tub/Shower unit  Bathroom Toilet: Standard  Bathroom Equipment: Shower chair  Home Equipment: Rolling walker, Cane  ADL Assistance: Independent  Homemaking Assistance: Independent  Homemaking Responsibilities: Yes (\"not right now, I don't\" but unable to specify assist)  Ambulation Assistance: Independent  Transfer Assistance: Independent  Active : Yes  Occupation: Unemployed  Type of occupation: Worked at [employer] in the past  Additional Comments: Father works from home except 1 day a week when he is out of the home. Pt's father states he may not have assist on the day he works as there is no family in the area. Above information obtained from father.     Objective  AROM RLE (degrees)  RLE AROM: WFL  AROM LLE (degrees)  LLE AROM : WFL     Strength RLE  Strength RLE: WFL  Strength LLE  Strength LLE: WFL     Transfers  Sit to Stand: Contact guard assistance (2x)  Stand to sit: Contact guard assistance (2x)     Ambulation  Ambulation?: Yes  Ambulation 1  Device: No Device  Assistance: Contact guard assistance  Quality of Gait: Narrow LV with occassional scissoring, veers R with gait at times, unsteady at times, R knee hyperextension in stance  Gait Deviations: Slow Lupe;Decreased step length  Distance: 400 feet  Stairs/Curb  Stairs?: Yes  Stairs  # Steps : 2  Stairs Height: 6\"  Rails: Right ascending  Comment: Reciprocal pattern     Balance  Sitting - Static: Good  Standing - Static: Fair;-  Standing - Dynamic: Fair;-    Plan   Plan  Times per week: 5-7  Current Treatment Recommendations: Balance Training, Strengthening, Functional Mobility Training, Stair training, Gait Training, Transfer Training, Safety Education & Training  Safety Devices  Type of devices: Call light within reach, Chair alarm in place, Left in chair, Nurse notified    Goals  Short term goals  Time Frame for Short term goals: by discharge  Short term goal 1: Sit to and from stand with supervision  Short term goal 2: Pt will ambulate 400 feet with supervision and with or without cane  Short term goal 3: Pt will ambulate up and down 1 flight steps with rail and SBA    Therapy Time   Individual Concurrent Group Co-treatment   Time In 1440         Time Out 1518         Minutes 38           Timed Code Treatment Minutes:   23    Total Treatment Minutes:  [address], PT

## 2021-08-02 NOTE — PROGRESS NOTES
Progress Note    Admit Date: 2021  Day: 2  Diet: ADULT DIET; Dysphagia - Pureed    CC: headache and aphasia    Interval history:   Pt was seen at bedside. Pt has been stable since admission  He denies any fever, chills, nausea, vomiting   Pt complained of mild headache but is improving. AOX2 today, able to state he is at hospital and name as well follows commands      HPI:  Flex Gómez is a 46 y.o. male with no PMHx who presents with a few weeks of worsening aphasia and headaches. Reports that he has had difficulty finding the right words. He has also had headaches and vision changes. Describes the vision changes as sometimes blurriness and some times partial vision loss. He says the symptoms wax and wane. He takes ibuprofen for the headache but it does not help. Last night he was dropping items at dinner and his father brought him to the [facility] ED. As per father- 1 month aog these problems started with mild facial paralysis, particularly in the upper lip area with visual problem that fluctuated. Concerned with cost he neglected it. His dad helped put constantino for medicade. Symptoms got worse suddenly around the past few days and  had periods of feeling lethargic. Last night having dinner at 1900 the pt felt weak and drop dish of sphaghetti and his cup of water. His dad saw this and immediately brought him to the hospital. In the ED the pt could not remember his name,  etc.  After given the IV steriod, the pt said he knew the answers but couldn't get the answers out and was able to answer the questions correctly. In  his mother had symptoms that involved small projection in the skull and could not find out what it was . One night she had a intense headache and went to ED and found out she had aggressive bone tumors involving ribs pelvis, skull. 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      In the ED, CT revealed multiple cystic masses with minimal thin peripheral enhancement, vasogenic edema, and mass effect with 7mm midline shift. He was transferred to [facility] for further management.     On arrival, he continued to have expressive aphasia but did not have any current complaints. Denies N/V, dizziness, hearing changes, dysphagia. Denies any hx of recurrent infections or immunocompromise. He is a 20-year smoker (unable to vocalize how much). His mother  from brain cancer believed to be lung cancer mets.       Medications:     Scheduled Meds:   cefepime  2,000 mg Intravenous Q8H    vancomycin  1,250 mg Intravenous Q8H    sodium chloride flush  10 mL Intravenous 2 times per day    polyethylene glycol  17 g Oral Daily    sennosides-docusate sodium  1 tablet Oral BID    dexamethasone  4 mg Intravenous Q6H    levetiracetam  500 mg Intravenous BID    pantoprazole  40 mg Oral QAM AC     Continuous Infusions:   sodium chloride       PRN Meds:sodium chloride flush, sodium chloride, acetaminophen, oxyCODONE **OR** oxyCODONE, naloxone, promethazine **OR** ondansetron, aluminum & magnesium hydroxide-simethicone, bisacodyl, labetalol    Objective:   Vitals:   T-max:  Patient Vitals for the past 8 hrs:   BP Temp Temp src Pulse Resp SpO2   21 0700 113/68 98.3 °F (36.8 °C) Oral 58 16 96 %   21 0230 (!) 102/58 98.3 °F (36.8 °C) Oral 62 16 93 %       Intake/Output Summary (Last 24 hours) at 2021 0854  Last data filed at 2021 0355  Gross per 24 hour   Intake 2982 ml   Output 1 ml   Net 2981 ml       Review of Systems   All other systems reviewed and are negative. Physical Exam  Constitutional:       Appearance: Normal appearance. HENT:      Head: Normocephalic and atraumatic. Nose: Nose normal.      Mouth/Throat:      Pharynx: Oropharynx is clear. Eyes:      Extraocular Movements: Extraocular movements intact. Conjunctiva/sclera: Conjunctivae normal.      Pupils: Pupils are equal, round, and reactive to light.    Cardiovascular:      Rate and Rhythm: Normal rate and regular rhythm. Pulses: Normal pulses. Heart sounds: Normal heart sounds. Pulmonary:      Effort: Pulmonary effort is normal.      Breath sounds: Normal breath sounds. Abdominal:      General: Abdomen is flat. Bowel sounds are normal.      Palpations: Abdomen is soft. Skin:     General: Skin is warm. Capillary Refill: Capillary refill takes less than 2 seconds. Neurological:      Mental Status: He is alert. Cranial Nerves: Facial asymmetry present. Gait: Gait abnormal.      Comments: Lower left facial droop. Mild gait ataxia. Mildly confused. AOx2         LABS:    CBC:   Recent Labs     07/30/21  1039 07/31/21 0445 08/01/21  0844   WBC 8.6 18.8* 15.2*   HGB 15.4 14.5 14.1   HCT 43.9 41.9 40.0*    293 267   MCV 92.2 92.5 91.2     Renal:    Recent Labs     07/31/21 0445 08/01/21  0423 08/02/21  0528    142 136   K 4.1 4.1 4.5    104 102   CO2 23 28 24   BUN 14 11 16   CREATININE 0.8* 0.8* 0.7*   GLUCOSE 140* 129* 110*   CALCIUM 8.7 9.1 8.9   MG 2.50* 2.60* 2.80*   PHOS 3.5 3.2 3.8   ANIONGAP 10 10 10     Hepatic:   Recent Labs     07/30/21  1039 07/30/21  1039 07/31/21 0445 08/01/21  0423 08/02/21  0528   AST 25  --   --   --   --    ALT 62*  --   --   --   --    BILITOT 0.6  --   --   --   --    PROT 6.9  --   --   --   --    LABALBU 4.4   < > 3.9 4.0 3.8   ALKPHOS 64  --   --   --   --     < > = values in this interval not displayed. Troponin:   No results for input(s): TROPONINI in the last 72 hours. BNP: No results for input(s): BNP in the last 72 hours. Lipids: No results for input(s): CHOL, HDL in the last 72 hours. Invalid input(s): LDLCALCU, TRIGLYCERIDE  ABGs:  No results for input(s): PHART, JAZ0WTQ, PO2ART, DBB1WSV, BEART, THGBART, [id number], LVZ1IYJ in the last 72 hours.     INR:   Recent Labs     07/30/21  1040   INR 1.06     Lactate: No results for input(s): LACTATE in the last 72 hours.  Cultures:  -----------------------------------------------------------------  RAD:   MRI BRAIN W WO CONTRAST   Final Result      Postoperative changes of bilateral frontotemporal craniotomy with partial evacuation of the dominant cystic lesions involving the right temporal, right parietal, and left frontal lobes. The other lesions are unchanged. There is stable associated    vasogenic edema. Improved, minimal residual right-to-left midline shift. Expected postoperative changes of pneumocephalus, minimal hemorrhagic change in the surgical cavity, and trace bilateral cerebral convexity subdural collections. Fluoroscopy modified barium swallow with video   Final Result      As above      CT Head wo Contrast   Final Result   1. Bilateral craniotomy, postsurgical change including pneumocephalus. 2.  No complicating features, hydrocephalus or herniation. CT CHEST ABDOMEN PELVIS WO CONTRAST   Final Result      CHEST:      1. No evidence of metastatic disease in the chest.   2.  No acute intrathoracic abnormality. ABDOMEN/PELVIS:      1. No evidence of metastatic disease in the abdomen and pelvis. 2.  No acute intra-abdominopelvic abnormality. MRI BRAIN W WO CONTRAST   Final Result      1. Bilateral cerebral intra-axial thin ring-enhancing cystic lesions with surrounding vasogenic edema (dominant lesions with incomplete ring enhancement at the cortical gray matter margin) and no internal diffusion restriction. The thin well-defined ring    of enhancement and no internal diffusion restriction raises concern for nonpyrogenic abscesses, such as fungal or paracytic infection. Cystic metastases are a possibility, but considered less likely given the thin well-defined ring enhancement.     Incomplete ring of enhancement along the cortical gray matter aspect of the lesions raises the thought of demyelinating disease, but this is unlikely given the hypointense signal on DWI images and the morphology of well-defined thin ring enhancement. 2. Stable mild right subfalcine herniation and uncal herniation                   Assessment/Plan:   Remi Jenkins is a 46 y.o. 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 Infectious process or HIV-related etiology (e.g. neurocysticercosis, toxo, CNS lymphoma) also possible and need to r/o HIV especially given prior male with male sexual hx. Mother had history of lung cancer that mets to the bones and skulls. - Brain MRI w/wo-  Bilateral cerebral intra-axial thin ring-enhancing cystic lesions with surrounding vasogenic edema. Stable mild right subfalcine herniation and uncal herniation. - CT C/A/P w/wo: unremarkable  - NSGY consult:07/30 - Stereotactic right frontotemporal craniotomy for evacuation of temporal and parietal cystic lesions. Stereotactic left frontotemporal craniotomy for evacuation of frontal cystic lesion preformed    - decadron 4mg q6h  - Keppra 500mg BID- Now taking PO   - [employer] HOB  - q4h neurochecks  - SBP<160: PRN labetalol  - HIV rapid & screen- negative  - repeat CT head for change in neuro exam  -PRN Oxycodone- 1 dose this AM  -ID following--patient got brain biopsy with neurosurgery-await cultures / path  -Vancomycin  2g IV ( started @ 7/31)  -Cefepime 2g IV ( started at 7/31- )    Code Status:Full code  FEN: NPO with sips for meds  PPX: SCDs  DISPO: Hannah Louie MD, PGY-1  08/02/21  8:54 AM    This patient has been staffed and discussed with Geoff Haynes MD.

## 2021-08-02 NOTE — PROGRESS NOTES
Clinical Pharmacy Progress Note    Admit date: 7/30/2021    Subjective/Objective:  45 yo male with no significant PMHx presented with worsening aphasia, headaches, and visual changes over several weeks. He was found to have 5 cystic masses concerning for infectious process. He underwent a R frontal temporal and L parietal craniotomy on 7/31, and specimens were collected and sent for culture. HIV screen was also sent due to concern for immunosuppression. Interval Update: Remains on IV cefepime + vancomycin; cultures pending. Pharmacy is consulted to dose vancomycin per Dr. Paty Rangel    Pertinent Medications:  Cefepime 2g IV q8h - day #3  Vancomycin - pharmacy to dose - day #3   7/31    (7/31-8/1)   1.25g IV q8h (8/1-current)    Recent Labs     08/01/21  0423 08/02/21  0528    136   K 4.1 4.5    102   CO2 28 24   PHOS 3.2 3.8   BUN 11 16   CREATININE 0.8* 0.7*     Estimated Creatinine Clearance: 146 mL/min (A) (based on SCr of 0.7 mg/dL (L)). Recent Labs     08/01/21  0844 08/02/21  0930   WBC 15.2* 10.6   HGB 14.1 15.0   HCT 40.0* 43.8   MCV 91.2 93.7    287     Height:  5' 10\" (177.8 cm)  Weight: 182 lb 8.7 oz (82.8 kg)    Micro:  R temporal mass; tissue, fungus, and AFB x2: NGTD  R temporal fluid (7/30): NGTD    Vancomycin Levels:  Trough 8/1/21 08:44 = 8.7 mcg/mL (drawn appropriately on 1g IV q8h)  Trough 8/2/21 08:38 = 20.5 mcg/mL -- drawn ~3h after dose hung; not true trough level      Assessment/Plan:  1) CNS infection: Vancomycin + Cefepime  · Vancomycin--pharmacy to dose  · On 1.25g IV q8h - dose increased 8/1 based on trough of 8.7 mcg/mL on 1g IV q8h. · Renal function remains stable. · Trough drawn this AM = 20.5 mcg/mL - level was drawn ~3h after previous dose was hung, so not accurate trough level. Calculated AUC based on level was 484; within target of 400-600. Will continue current dose. · Repeat trough ordered for tomorrow AM, 8/3.  Target trough ~15-20 mcg/mL. · Clinical status will be monitored closely / dose will be adjusted as appropriate. Please call with any questions.   Pia Roque PharmD, BCPS  Wireless: [phone]   8/2/2021 11:14 AM

## 2021-08-02 NOTE — PROGRESS NOTES
Bedside report done with Steffi Shield. Pt in the chair. Incision to the head CDI. Pt A/O to person. VSS. No signs of pain per FLACC pain scale. Bed alarm on, wheels locked, bed in lowest position, side rails up 2/4, nonskid socks on, call light and bedside table in reach. Will continue to monitor.

## 2021-08-02 NOTE — PROGRESS NOTES
Patient is alert and oriented to self and place. Vital signs are stable. Patient's pain is controlled with medication per MAR. Patient ambulates x1 person assist with a gait belt. Patient tolerates ambulation well. Patient voiding urine without complication. Bed is in the lowest position. Bed alarm is activated. Call light is within reach. Will continue to monitor and reassess.

## 2021-08-03 PROBLEM — Z98.890 S/P CRANIOTOMY: Status: ACTIVE | Noted: 2021-08-03

## 2021-08-03 LAB
ALBUMIN SERPL-MCNC: 3.7 G/DL (ref 3.4–5)
ANION GAP SERPL CALCULATED.3IONS-SCNC: 10 MMOL/L (ref 3–16)
BASOPHILS ABSOLUTE: 0 K/UL (ref 0–0.2)
BASOPHILS RELATIVE PERCENT: 0.1 %
BUN BLDV-MCNC: 18 MG/DL (ref 7–20)
CALCIUM SERPL-MCNC: 8.9 MG/DL (ref 8.3–10.6)
CHLORIDE BLD-SCNC: 102 MMOL/L (ref 99–110)
CO2: 24 MMOL/L (ref 21–32)
CREAT SERPL-MCNC: 0.7 MG/DL (ref 0.9–1.3)
EOSINOPHILS ABSOLUTE: 0 K/UL (ref 0–0.6)
EOSINOPHILS RELATIVE PERCENT: 0 %
GFR AFRICAN AMERICAN: >60
GFR NON-AFRICAN AMERICAN: >60
GLUCOSE BLD-MCNC: 114 MG/DL (ref 70–99)
HCT VFR BLD CALC: 39 % (ref 40.5–52.5)
HEMOGLOBIN: 13.8 G/DL (ref 13.5–17.5)
LYMPHOCYTES ABSOLUTE: 1.6 K/UL (ref 1–5.1)
LYMPHOCYTES RELATIVE PERCENT: 17.7 %
MAGNESIUM: 2.5 MG/DL (ref 1.8–2.4)
MCH RBC QN AUTO: 32.4 PG (ref 26–34)
MCHC RBC AUTO-ENTMCNC: 35.5 G/DL (ref 31–36)
MCV RBC AUTO: 91.4 FL (ref 80–100)
MONOCYTES ABSOLUTE: 0.5 K/UL (ref 0–1.3)
MONOCYTES RELATIVE PERCENT: 5.9 %
NEUTROPHILS ABSOLUTE: 7 K/UL (ref 1.7–7.7)
NEUTROPHILS RELATIVE PERCENT: 76.3 %
PDW BLD-RTO: 13 % (ref 12.4–15.4)
PHOSPHORUS: 3.5 MG/DL (ref 2.5–4.9)
PLATELET # BLD: 270 K/UL (ref 135–450)
PMV BLD AUTO: 8.4 FL (ref 5–10.5)
POTASSIUM SERPL-SCNC: 4.5 MMOL/L (ref 3.5–5.1)
RBC # BLD: 4.27 M/UL (ref 4.2–5.9)
SODIUM BLD-SCNC: 136 MMOL/L (ref 136–145)
VANCOMYCIN TROUGH: 10.6 UG/ML (ref 10–20)
WBC # BLD: 9.2 K/UL (ref 4–11)

## 2021-08-03 PROCEDURE — 6370000000 HC RX 637 (ALT 250 FOR IP)

## 2021-08-03 PROCEDURE — 6360000002 HC RX W HCPCS

## 2021-08-03 PROCEDURE — 6360000002 HC RX W HCPCS: Performed by: STUDENT IN AN ORGANIZED HEALTH CARE EDUCATION/TRAINING PROGRAM

## 2021-08-03 PROCEDURE — 99232 SBSQ HOSP IP/OBS MODERATE 35: CPT | Performed by: INTERNAL MEDICINE

## 2021-08-03 PROCEDURE — 6360000002 HC RX W HCPCS: Performed by: NEUROLOGICAL SURGERY

## 2021-08-03 PROCEDURE — 2580000003 HC RX 258

## 2021-08-03 PROCEDURE — 97530 THERAPEUTIC ACTIVITIES: CPT

## 2021-08-03 PROCEDURE — 2580000003 HC RX 258: Performed by: NEUROLOGICAL SURGERY

## 2021-08-03 PROCEDURE — 2060000000 HC ICU INTERMEDIATE R&B

## 2021-08-03 PROCEDURE — 80069 RENAL FUNCTION PANEL: CPT

## 2021-08-03 PROCEDURE — 80202 ASSAY OF VANCOMYCIN: CPT

## 2021-08-03 PROCEDURE — 97116 GAIT TRAINING THERAPY: CPT

## 2021-08-03 PROCEDURE — 36415 COLL VENOUS BLD VENIPUNCTURE: CPT

## 2021-08-03 PROCEDURE — 83735 ASSAY OF MAGNESIUM: CPT

## 2021-08-03 PROCEDURE — 97535 SELF CARE MNGMENT TRAINING: CPT

## 2021-08-03 PROCEDURE — 92526 ORAL FUNCTION THERAPY: CPT

## 2021-08-03 PROCEDURE — 85025 COMPLETE CBC W/AUTO DIFF WBC: CPT

## 2021-08-03 PROCEDURE — 92507 TX SP LANG VOICE COMM INDIV: CPT

## 2021-08-03 RX ORDER — DEXAMETHASONE 4 MG/1
4 TABLET ORAL EVERY 8 HOURS
Qty: 12 TABLET | Refills: 0 | Status: SHIPPED | OUTPATIENT
Start: 2021-08-03 | End: 2021-08-03 | Stop reason: SDUPTHER

## 2021-08-03 RX ORDER — PANTOPRAZOLE SODIUM 40 MG/1
40 TABLET, DELAYED RELEASE ORAL
Qty: 30 TABLET | Refills: 3 | Status: SHIPPED | OUTPATIENT
Start: 2021-08-04

## 2021-08-03 RX ORDER — LEVETIRACETAM 500 MG/1
500 TABLET ORAL 2 TIMES DAILY
Qty: 30 TABLET | Refills: 0 | Status: SHIPPED | OUTPATIENT
Start: 2021-08-03 | End: 2021-08-18

## 2021-08-03 RX ORDER — PANTOPRAZOLE SODIUM 40 MG/1
40 TABLET, DELAYED RELEASE ORAL
Qty: 30 TABLET | Refills: 3 | Status: SHIPPED | OUTPATIENT
Start: 2021-08-04 | End: 2021-08-03

## 2021-08-03 RX ORDER — OXYCODONE HYDROCHLORIDE 5 MG/1
5 TABLET ORAL EVERY 6 HOURS PRN
Qty: 28 TABLET | Refills: 0 | Status: SHIPPED | OUTPATIENT
Start: 2021-08-03 | End: 2021-08-03

## 2021-08-03 RX ORDER — POLYETHYLENE GLYCOL 3350 17 G/17G
17 POWDER, FOR SOLUTION ORAL DAILY
Qty: 527 G | Refills: 1 | COMMUNITY
Start: 2021-08-04 | End: 2021-08-03

## 2021-08-03 RX ORDER — DEXAMETHASONE 4 MG/1
4 TABLET ORAL EVERY 8 HOURS
Qty: 12 TABLET | Refills: 0 | Status: SHIPPED | OUTPATIENT
Start: 2021-08-03 | End: 2021-08-05

## 2021-08-03 RX ORDER — POLYETHYLENE GLYCOL 3350 17 G/17G
17 POWDER, FOR SOLUTION ORAL DAILY
Qty: 527 G | Refills: 1 | COMMUNITY
Start: 2021-08-04 | End: 2021-09-03

## 2021-08-03 RX ORDER — OXYCODONE HYDROCHLORIDE 5 MG/1
5 TABLET ORAL EVERY 6 HOURS PRN
Qty: 28 TABLET | Refills: 0 | Status: SHIPPED | OUTPATIENT
Start: 2021-08-03 | End: 2021-08-10

## 2021-08-03 RX ORDER — SENNA AND DOCUSATE SODIUM 50; 8.6 MG/1; MG/1
1 TABLET, FILM COATED ORAL 2 TIMES DAILY
COMMUNITY
Start: 2021-08-03

## 2021-08-03 RX ORDER — LEVETIRACETAM 500 MG/1
500 TABLET ORAL 2 TIMES DAILY
Qty: 30 TABLET | Refills: 0 | Status: SHIPPED | OUTPATIENT
Start: 2021-08-03 | End: 2021-08-03

## 2021-08-03 RX ADMIN — DEXAMETHASONE SODIUM PHOSPHATE 4 MG: 4 INJECTION, SOLUTION INTRAMUSCULAR; INTRAVENOUS at 05:08

## 2021-08-03 RX ADMIN — LEVETIRACETAM 500 MG: 500 TABLET ORAL at 08:31

## 2021-08-03 RX ADMIN — LEVETIRACETAM 500 MG: 500 TABLET ORAL at 22:05

## 2021-08-03 RX ADMIN — OXYCODONE 10 MG: 5 TABLET ORAL at 12:50

## 2021-08-03 RX ADMIN — DOCUSATE SODIUM 50 MG AND SENNOSIDES 8.6 MG 1 TABLET: 8.6; 5 TABLET, FILM COATED ORAL at 08:31

## 2021-08-03 RX ADMIN — Medication 10 ML: at 08:31

## 2021-08-03 RX ADMIN — VANCOMYCIN HYDROCHLORIDE 1250 MG: 10 INJECTION, POWDER, LYOPHILIZED, FOR SOLUTION INTRAVENOUS at 05:09

## 2021-08-03 RX ADMIN — DEXAMETHASONE SODIUM PHOSPHATE 4 MG: 4 INJECTION, SOLUTION INTRAMUSCULAR; INTRAVENOUS at 10:53

## 2021-08-03 RX ADMIN — DEXAMETHASONE SODIUM PHOSPHATE 4 MG: 4 INJECTION, SOLUTION INTRAMUSCULAR; INTRAVENOUS at 17:46

## 2021-08-03 RX ADMIN — DOCUSATE SODIUM 50 MG AND SENNOSIDES 8.6 MG 1 TABLET: 8.6; 5 TABLET, FILM COATED ORAL at 22:05

## 2021-08-03 RX ADMIN — HEPARIN SODIUM 5000 UNITS: 5000 INJECTION INTRAVENOUS; SUBCUTANEOUS at 22:08

## 2021-08-03 RX ADMIN — VANCOMYCIN HYDROCHLORIDE 1500 MG: 10 INJECTION, POWDER, LYOPHILIZED, FOR SOLUTION INTRAVENOUS at 13:43

## 2021-08-03 RX ADMIN — CEFEPIME 2000 MG: 2 INJECTION, POWDER, FOR SOLUTION INTRAMUSCULAR; INTRAVENOUS at 09:36

## 2021-08-03 RX ADMIN — HEPARIN SODIUM 5000 UNITS: 5000 INJECTION INTRAVENOUS; SUBCUTANEOUS at 05:08

## 2021-08-03 RX ADMIN — DEXAMETHASONE SODIUM PHOSPHATE 4 MG: 4 INJECTION, SOLUTION INTRAMUSCULAR; INTRAVENOUS at 00:17

## 2021-08-03 RX ADMIN — HEPARIN SODIUM 5000 UNITS: 5000 INJECTION INTRAVENOUS; SUBCUTANEOUS at 13:45

## 2021-08-03 RX ADMIN — CEFEPIME 2000 MG: 2 INJECTION, POWDER, FOR SOLUTION INTRAMUSCULAR; INTRAVENOUS at 01:29

## 2021-08-03 RX ADMIN — POLYETHYLENE GLYCOL 3350 17 G: 17 POWDER, FOR SOLUTION ORAL at 08:30

## 2021-08-03 RX ADMIN — DEXAMETHASONE SODIUM PHOSPHATE 4 MG: 4 INJECTION, SOLUTION INTRAMUSCULAR; INTRAVENOUS at 22:04

## 2021-08-03 RX ADMIN — OXYCODONE 10 MG: 5 TABLET ORAL at 01:29

## 2021-08-03 RX ADMIN — PANTOPRAZOLE SODIUM 40 MG: 40 TABLET, DELAYED RELEASE ORAL at 05:10

## 2021-08-03 ASSESSMENT — PAIN DESCRIPTION - ORIENTATION
ORIENTATION: RIGHT;LEFT
ORIENTATION: RIGHT;LEFT

## 2021-08-03 ASSESSMENT — PAIN DESCRIPTION - PAIN TYPE: TYPE: SURGICAL PAIN

## 2021-08-03 ASSESSMENT — PAIN DESCRIPTION - PROGRESSION
CLINICAL_PROGRESSION: NOT CHANGED
CLINICAL_PROGRESSION: NOT CHANGED

## 2021-08-03 ASSESSMENT — PAIN DESCRIPTION - LOCATION
LOCATION: HEAD
LOCATION: HEAD

## 2021-08-03 ASSESSMENT — PAIN - FUNCTIONAL ASSESSMENT
PAIN_FUNCTIONAL_ASSESSMENT: PREVENTS OR INTERFERES SOME ACTIVE ACTIVITIES AND ADLS
PAIN_FUNCTIONAL_ASSESSMENT: PREVENTS OR INTERFERES SOME ACTIVE ACTIVITIES AND ADLS

## 2021-08-03 ASSESSMENT — PAIN DESCRIPTION - ONSET
ONSET: ON-GOING
ONSET: ON-GOING

## 2021-08-03 ASSESSMENT — PAIN DESCRIPTION - DESCRIPTORS
DESCRIPTORS: DISCOMFORT;ACHING
DESCRIPTORS: ACHING;DISCOMFORT

## 2021-08-03 ASSESSMENT — PAIN SCALES - GENERAL
PAINLEVEL_OUTOF10: 0
PAINLEVEL_OUTOF10: 10
PAINLEVEL_OUTOF10: 10
PAINLEVEL_OUTOF10: 7
PAINLEVEL_OUTOF10: 0

## 2021-08-03 ASSESSMENT — PAIN DESCRIPTION - FREQUENCY
FREQUENCY: CONTINUOUS
FREQUENCY: CONTINUOUS

## 2021-08-03 NOTE — PROGRESS NOTES
Clinical Pharmacy Progress Note    46 y.o. male admitted with intraparenchymal cystic lesions. Pharmacy has been asked by Dr. Lorne Quiñonez to adjust all drips to normal saline as appropriate based on compatibility to avoid fluid shifts since D5 is osmotically active. The following intermittent IV drips/infusions have been adjusted to saline:  Cefepime  Vancomycin    Total IV fluid delivered to patient over last 24h: 700mL    RPh will follow daily to ensure all new IVPBs + drips are in NS. Please call with questions.   Izabel Feliciano, PharmD, BCPS  Wireless: [phone]   8/3/2021 9:28 AM

## 2021-08-03 NOTE — PROGRESS NOTES
Physical Therapy  Daily Treatment Note    Discharge Recommendations: Farnaz Alcala scored a 19/24 on the AM-PAC short mobility form. Current research shows that an AM-PAC score of 18 or greater is typically associated with a discharge to the patient's home setting. Based on the patient's AM-PAC score and their current functional mobility deficits, it is recommended that the patient have 2-3 sessions per week of Physical Therapy at d/c to increase the patient's independence. At this time, this patient demonstrates the endurance and safety to discharge home with home PT and a follow up treatment frequency of 2-3x/wk. Please see assessment section for further patient specific details. Assessment:  Pt mildly impulsive at times. Needs occasional cues for safety, especially due to IV lines. Pt needing CGA for safe ambulation at this time. Did well on stairs with railing. Plan is for home with 24 hour assist of family. Would benefit from continued PT to work towards baseline function. No DME needs anticipated. HOME HEALTH CARE: LEVEL 1 STANDARD  - Initial home health evaluation to occur within 24-48 hours, in patient home   - Therapy to evaluate with goal of regaining prior level of functioning   - Therapy to evaluate if patient has [id number] needs for personal care    Equipment Needs: Defer to next level of care    Chart Reviewed: Yes     Other position/activity restrictions: seizure precautions, up as tolerated   Additional Pertinent Hx: 47 yo male admit to ED 7/30 p/w worsening aphasia and headaches. CT Head (+) multiple cystic massess and vasogenic edema. Undergoing R/L craniotomy for evacuation on 7/30. PMHx: none listed      Diagnosis: Brain mass   Treatment Diagnosis: Decreased dynamic balance    Subjective: Pt in bed initially. Agreeable to working with to PT. Expressive aphasia noted, but able to answer some questions. Pain: c/o discomfort R side of head. No grimacing noted during session. next treatment, this note will serve as the discharge summary.     Renu Smith #[id number]

## 2021-08-03 NOTE — PROGRESS NOTES
No acute events overnight. VSS. Pt is A/O to person, place and situation. Pt taking Po pain medication for incisional pain on the head. Incisions on the head are CDI. Pt ambulating CGA with GB. Pt has an unsteady gait. Pt voiding adequately per toilet. Bed alarm on, wheels locked, bed in lowest position, side rails up 2/4, nonskid socks on, call light and bedside table in reach. Will continue to monitor.

## 2021-08-03 NOTE — PLAN OF CARE
Problem: Falls - Risk of:  Goal: Will remain free from falls  Description: Will remain free from falls  Outcome: Ongoing   Fall risk precautions in place. Bed is locked and in lowest position. 2/4 side rails up. Call light within reach. Fall risk Bracelet in place. Frequent checks on patient. Free from falls at this time. Will continue to monitor. Problem: Pain:  Goal: Pain level will decrease  Description: Pain level will decrease  Outcome: Ongoing   PRN medication given for pain. Will continue to monitor.

## 2021-08-03 NOTE — PROGRESS NOTES
ID Follow-up NOTE  RESIDENT NOTE - reviewed / edited, attending note at bottom    CC:   Multiple brain masses   Antibiotics: Vancomycin, Cefepime    Admit Date: 7/30/2021  Hospital Day: 5    Subjective:     POD#4 for stereotactic right frontotemporal craniotomy for evacuation of temporal and parietal cystic lesions and stereotactic left frontotemporal craniotomy for evacuation of frontal cystic lesion. Drainage showed clear, yellow fluid. Surgical pathology and cultures, fungal and AFB sent    Pt sitting comfortably in bed. He was a little more conversant today. When asked how he was doing he shrugged his shoulders. No complaints at this time. Denies fevers/chills  No fevers reported, WBC today 9.6, 10.6 (8/2),. On vancomycin and cefepime Day 4    Objective:     Patient Vitals for the past 8 hrs:   BP Temp Temp src Pulse Resp SpO2 Weight   08/03/21 0715 113/64 98.1 °F (36.7 °C) Oral 59 16 97 % --   08/03/21 0600 -- -- -- -- -- -- 176 lb 2.4 oz (79.9 kg)   08/03/21 0229 (!) 98/59 98.4 °F (36.9 °C) Tympanic 54 16 94 % --     I/O last 3 completed shifts: In: 240 [P.O.:240]  Out: -   No intake/output data recorded. EXAM:  GENERAL: No apparent distress.     HEENT: Membranes moist, no oral lesion - craniotomy wounds with staples, no drainage  NECK:  Supple, no lymphadenopathy  LUNGS: Clear b/l, no rales, no dullness  CARDIAC: RRR, no murmur appreciated  ABD:  + BS, soft / NT  EXT:  No rash, no edema, no lesions  NEURO: No focal neurologic findings  PSYCH: Orientation, sensorium, mood normal  LINES:  Peripheral iv       Data Review:  Lab Results   Component Value Date    WBC 9.2 08/03/2021    HGB 13.8 08/03/2021    HCT 39.0 (L) 08/03/2021    MCV 91.4 08/03/2021     08/03/2021     Lab Results   Component Value Date    CREATININE 0.7 (L) 08/02/2021    BUN 16 08/02/2021     08/02/2021    K 4.5 08/02/2021     08/02/2021    CO2 24 08/02/2021       Hepatic Function Panel:   Lab Results   Component Value Date    ALKPHOS 64 2021    ALT 62 2021    AST 25 2021    PROT 6.9 2021    BILITOT 0.6 2021    LABALBU 3.8 2021       MICRO:    Surgical culture: No WBC or organisms seen, NGTD  Tissue culture: No WBC or organisms seen, NGTD  Body fluid culture: Staphylococcus epidermidis  AFB culture: no AFB by fluorescent stain  Fungus culture Head, body fluid, tissue: no fungal elements seen  HIV: non-reactive  Hepatitis Group: non reactive  IgA: 136  IgM: 86    IMAGIN/31 MRI BRAIN W WO CONTRAST   Postoperative changes of bilateral frontotemporal craniotomy with partial evacuation of the dominant cystic lesions involving the right temporal, right parietal, and left frontal lobes. The other lesions are unchanged. There is stable associated   vasogenic edema.     Improved, minimal residual right-to-left midline shift.     Expected postoperative changes of pneumocephalus, minimal hemorrhagic change in the surgical cavity, and trace bilateral cerebral convexity subdural collections.  CT HEAD WO CONTRAST  1.  Bilateral craniotomy, postsurgical change including pneumocephalus. 2.  No complicating features, hydrocephalus or herniation.  MRI BRIAIN W WO CONTRAST  1. Bilateral cerebral intra-axial thin ring-enhancing cystic lesions with surrounding vasogenic edema (dominant lesions with incomplete ring enhancement at the cortical gray matter margin) and no internal diffusion restriction. The thin well-defined ring of enhancement and no internal diffusion restriction raises concern for nonpyrogenic abscesses, such as fungal or paracytic infection. Cystic metastases are a possibility, but considered less likely given the thin well-defined ring enhancement.    Incomplete ring of enhancement along the cortical gray matter aspect of the lesions raises the thought of demyelinating disease, but this is unlikely given the hypointense signal on DWI images and the morphology of well-defined thin ring enhancement. 2. Stable mild right subfalcine herniation and uncal herniation    7/30 CT CHEST ABDOMEN PELVIS  CHEST:  1.  No evidence of metastatic disease in the chest.  2.  No acute intrathoracic abnormality. ABDOMEN/PELVIS:  1.  No evidence of metastatic disease in the abdomen and pelvis. 2.  No acute intra-abdominopelvic abnormality. 7/29 CT HEAD WO CONTRAST  1. At least 5 cystic masses in the bilateral cerebral hemispheres demonstrate  minimal thin peripheral enhancement.  Surrounding vasogenic edema and mass  effect with right to left midline shift measuring 7 mm and diffuse sulcal  effacement throughout the bilateral cerebral hemispheres.  Crowding of the  right quadrigeminal and ambient cisterns.  These masses may represent  intracranial metastatic disease.  Additional considerations include  neurocysticercosis and cerebral abscesses in the appropriate clinical  setting.  Tumefactive demyelination is considered less likely but difficult  to entirely exclude.  Recommend follow-up brain MRI with and without  intravenous contrast for further evaluation.  Additionally, follow-up CT  chest, abdomen and pelvis may be helpful. 7/29 CTA HEAD NECK W CONTRAST  2.  No acute abnormality in the large arteries of the head and neck.  No focal  hemodynamically significant stenosis or occlusion.       Scheduled Meds:   levETIRAcetam  500 mg Oral BID    heparin (porcine)  5,000 Units Subcutaneous 3 times per day    cefepime  2,000 mg Intravenous Q8H    vancomycin  1,250 mg Intravenous Q8H    sodium chloride flush  10 mL Intravenous 2 times per day    polyethylene glycol  17 g Oral Daily    sennosides-docusate sodium  1 tablet Oral BID    dexamethasone  4 mg Intravenous Q6H    pantoprazole  40 mg Oral QAM AC       Continuous Infusions:   sodium chloride 25 mL (08/02/21 1315)       PRN Meds:  sodium chloride flush, sodium chloride, acetaminophen, oxyCODONE **OR** oxyCODONE, naloxone, promethazine **OR** ondansetron, aluminum & magnesium hydroxide-simethicone, bisacodyl, labetalol      Assessment:     Yvon Pulido is a 46 y. o. male with no PMH who presents to the emergency department brought in by his father with c/o AMS and speech difficulty. For the last 1.5 months. MRI was shown to have 5 bihemispheric ring-enhancing lesions  Possible brain abscess vs metastasis, pt has negative metastasis CT chest, abd, pelvis  Pathogens include  -neurocysticercosis  -toxoplasmosis  -actinomyces, nocardia  -fungal infections, aspergillosis  -primary CNS lymphoma  Patient has no indication of immunocompromised, HIV non-reactive, Hepatitis panel non-reactive. -Body fluid culture from surgical sites grew Staph epidermidis, with no fungal elements seen  -Tissue biopsy cultures, NGTD  -AFB smears were negative    Plan:     -cont vancomycin and cefepime (Day 4)  -await surgical pathology and cytology  -follow-up cultures  -immunoglobulin levels WNL  -await CD4 counts    Discussed with Dr. Vani Steve MD,PGY-1    Addendum to Resident Progress note:  Pt seen, examined and evaluated. I have reviewed the current history, physical findings, labs and assessment and plan and agree with note as documented by resident (Dr. French).     47 yo man with no known PMH.  + cig use      Pt reports 1-2 mo difficulty with speech, unable to find words, slurred speech.   He has had blurred vision, bilateral.    Less awake / alert over 1-2 days  He developed inability to hold a cup / plate with his R hand on 7/29 and then presented to [facility] ED     In ED, afeb.  Abnormal CT with mult cystic cerebral lesions  Transferred and admitted to [facility] on 7/30  Had MRI - mult cystic cerebral lesions with edema   s/p brain biopsy 7/30      HIV screen neg     IMP/  CNS lesions,               5 separate lesions                     concern infection though not typical for bacterial ella abscesse              No hx or indication of immunocompromised               No hx or known primary cancer (chest / abd / pelvic CT neg)     POD#4 drainage - \"cyst was extensively loculated with numerous membranes lined with necrotic appearing whitish material\"  GS    No WBC, no organism  Fungal stain neg. AFB stain neg     REC/  Can d/c vanco / cefepime from ID standpoint  Obtain CD4 count - never collected  Await path - reviewed case and discussed with Pathologist - Dr Tanna Munoz 8/2 (requested special stains)     Medical Decision Making:   The following items were considered in medical decision making:  Discussion of patient care with other providers  Reviewed clinical lab tests  Reviewed radiology tests  Reviewed other diagnostic tests/interventions  Independent review of radiologic images - reviewed with Radiologist 7/30  Microbiology cultures and other micro tests reviewed       Discussed with pt, Pathologist - Dr Tanna Munoz, Radiologist - Dr Dlai Sharp MD

## 2021-08-03 NOTE — PROGRESS NOTES
Speech Language Pathology  Facility/Department: [facility] 5T ORTHO/NEURO  Dysphagia Daily Treatment Note    NAME: Kim Thompson  : 1980  MRN: [id number]    Patient Diagnosis(es):   Patient Active Problem List    Diagnosis Date Noted    Brain mass     Somnolence 2021    Dysphasia 2021    Brain cyst 2021     Allergies: No Known Allergies    RECENT RESULTS MRI BRAIN (21)  1. Bilateral cerebral intra-axial thin ring-enhancing cystic lesions with surrounding vasogenic edema (dominant lesions with incomplete ring enhancement at the cortical gray matter margin) and no internal diffusion restriction. The thin well-defined ring    of enhancement and no internal diffusion restriction raises concern for nonpyrogenic abscesses, such as fungal or paracytic infection. Cystic metastases are a possibility, but considered less likely given the thin well-defined ring enhancement. Incomplete ring of enhancement along the cortical gray matter aspect of the lesions raises the thought of demyelinating disease, but this is unlikely given the hypointense signal on DWI images and the morphology of well-defined thin ring enhancement. 2. Stable mild right subfalcine herniation and uncal herniation            RECENT RESULTS  CT OF HEAD (21)  1.  Bilateral craniotomy, postsurgical change including pneumocephalus. 2.  No complicating features, hydrocephalus or herniation. Repeat MRI 21     Postoperative changes of bilateral frontotemporal craniotomy with partial evacuation of the dominant cystic lesions involving the right temporal, right parietal, and left frontal lobes. The other lesions are unchanged.  There is stable associated    vasogenic edema.       Improved, minimal residual right-to-left midline shift.       Expected postoperative changes of pneumocephalus, minimal hemorrhagic change in the surgical cavity, and trace bilateral cerebral convexity subdural collections.     Previous MBS - n/a    Chart reviewed. Medical Diagnosis: brain mass  Treatment Diagnosis: dysphagia    BSE Impression 7/31/21  Oral dysphagia with reduced labial seal resulting in persistent anterior loss on R with all consistencies. Prolonged posterior transit of all with intermittent bolus holding noted. Pooling of secretions noted in R buccal cavity and mild oral residue on R with puree solids. Solids beyond puree not trialed d/t apparent odynophagia and need for instrumental assessment. No overt s/s associated with aspiration exhibited with any consistency trialed but unable to fully evaluate as pt did not follow directions for 3 oz water test and did not consistently vocalize to assess for wet quality. Recommend MBS for full assessment of oral and pharyngeal phases.       MBS results 7/31/21  Oral Phase: Oral phase marked by reduced labial seal with anterior loss on R with all liquids, reduced bolus control with larger amounts resulting in premature spillage over base of tongue, slowed posterior propulsion, and R buccal residue with all consistencies. Pt with severely impaired mastication of regular solid, initially just holding bolus on lingual surface with no initiation to masticate. Pt then demonstrated slowed, laborious, and inefficient masticatory pattern with facial grimacing - endorsed pain when questioned. Minimal recollection of solid bolus occurred with piecemeal swallowing and liquid rinse required to fully clear. Pharyngeal: Delayed swallow initiation placing pt at risk of aspiration with larger liquid boluses. Due to posterior spillage over base of tongue + delayed initiation, pt with trace aspiration of thin liquids via cup edge prior to the swallow x 1 and moderately deep penetration of nectar thick liquids via cup edge x 1.  Pt with delayed, weak throat clear in response to aspiration in addition to grimacing and pointing to chest. No penetration or aspiration observed with small boluses of liquids (via tsp or cued for small sip via cup and straw) or solid textures. Instance of reduced laryngeal elevation resulted in penetrated material from nectar thick liquids not fully clearing from laryngeal vestibule immediately but eventually cleared with subsequent swallows. Intermittent vallecular residue in between swallows d/t spillover from oral cavity. Adequate pharyngeal stripping wave with no significant pharyngeal residue after the swallow. Upper Esophageal Screen: Pt with delayed belching and hiccuping after completion of procedure. Pain: denies    Current Diet : puree/thin liquids    Treatment:  Pt seen bedside to address the following goals:  Goal 1: Pt will consume PO with <3 instances of anterior loss on R x 2 sessions. 8/2: no anterior loss of bolus presented via tsp/cup and straw  Cont goal  8/3- goal met- no anterior loss of cracker and water by straw as well as when RN administered medication with water by straw. Goal 2: Pt will participate in MBS procedure. 7/31 (s/p MBS): GOAL MET     New goals:  Goal 3: Pt will consume recommended diet consistency w/o respiratory decline or overt s/s associated with aspiration. 8/2: no respiratory decline per chart review, no concerns per RN. Pt consumed liquids via tsp,  cup and straw with no overt signs of aspiration, voice remained clear. Pt completed 18 repetitions of effortful swallow after ice chips and tsps of water. Pt encouraged to complete throughout the day  Goal met, cont to ensure consistency  8/3-  RN reported no concerns re: swallowing or lung status. Pt was analyzed with medication with water, cracker and water by straw. No s/s aspiration noted. Pt encouraged to utilize effortful swallow through out the session. con't goal     Goal 4: Pt will consume soft solids with adequate mastication and min oral residue x 2 sessions. 8/2: pt analyzed with small piece of malka cracker dipped in applesauce to soften slightly.  Pt demonstrated prolonged mastication, intermittent holding requiring cues to cont chewing. Pt cont with grimacing and report of pain (pointing to jaw/neck area) when chewing. This was noted in prior sessions as well. Portion of bolus remained, required increased cues to cont to masticate with liquid wash then clearing. Cont goal  8/3- pt with slow, prolonged mastication with cracker with intermittent oral holding. Pt required cues to continue with mastication. Pt with instances of grimacing and reporting jaw pain with mastication. Pt with lingual residue noted which required a liquid wash to clear. Because of this, recommend con't pureed diet at this time. con't goal      Patient/Family/Caregiver Education:  Pt educated to the purpose of the visit and swallowing strategies. Pt indicated comprehension    Compensatory Strategies:  Supervision: 1:1 (will require d/t cognitive linguistic deficits - cues for small sips of liquids)  Upright as possible for all oral intake  Check for pocketing of food   Alternate solids and liquids  Remain upright for 30-45 minutes after meals  Small bites/sips        Plan:  Continued daily Dysphagia treatment with goals per  plan of care. Diet recommendations:cont puree/thin liquids  DC recommendation: ongoing treatment indicated  Treatment: 15  D/W nursingDee  Needs met prior to leaving room, call button in reach.     Alexi Morena, [place], [address]  Speech-Language Pathologist  Pager [phone]    If patient is discharged prior to next treatment, this note will serve as the discharge summary

## 2021-08-03 NOTE — PROGRESS NOTES
Clinical Pharmacy Progress Note    Admit date: 7/30/2021    Subjective/Objective:  45 yo male with no significant PMHx presented with worsening aphasia, headaches, and visual changes over several weeks. He was found to have 5 cystic masses concerning for infectious process. He underwent a R frontal temporal and L parietal craniotomy on 7/31, and specimens were collected and sent for culture. HIV screen was also sent due to concern for immunosuppression. Interval Update: Remains on IV cefepime + vancomycin; cultures pending. Pharmacy is consulted to dose vancomycin per Dr. Grant Deter    Pertinent Medications:  Cefepime 2g IV q8h - day #4  Vancomycin - pharmacy to dose - day #4   7/31    (7/31-8/1)    (8/1-8/3)   1.5g IV q8h (8/3-current)    Recent Labs     08/02/21  0528 08/03/21  0501    136   K 4.5 4.5    102   CO2 24 24   PHOS 3.8 3.5   BUN 16 18   CREATININE 0.7* 0.7*     Estimated Creatinine Clearance: 146 mL/min (A) (based on SCr of 0.7 mg/dL (L)). Recent Labs     08/02/21  0930 08/03/21  0501   WBC 10.6 9.2   HGB 15.0 13.8   HCT 43.8 39.0*   MCV 93.7 91.4    270     Height:  5' 10\" (177.8 cm)  Weight: 176 lb 2.4 oz (79.9 kg)    Micro:  R temporal mass; tissue, fungus, and AFB x2: NGTD  R temporal fluid (7/30): NGTD    Vancomycin Levels:  Trough 8/1/21 08:44 = 8.7 mcg/mL (drawn appropriately on 1g IV q8h)  Trough 8/2/21 08:38 = 20.5 mcg/mL -- drawn ~3h after dose hung; not true trough level  Trough 8/3/21 05:01 = 10.6 mcg/mL - drawn appropriately on 1.25g IV q8h      Assessment/Plan:  1) CNS infection: Vancomycin + Cefepime  · Vancomycin--pharmacy to dose  · Trough overnight = 10.6 mcg/mL, timed appropriately on 1.25g IV q8h. Renal function remains stable. · Will increase dose to 1.5g IV q8h to target trough closer to 15-20 mcg/mL. Predicted AUC on updated regimen ~534, within goal of 400-600. · Repeat trough will be ordered when appropriate. Target trough ~15-20 mcg/mL.   · Clinical status will be monitored closely / dose will be adjusted as appropriate. Please call with any questions.   Holger Chung PharmD, BCPS  Wireless: [id number]   8/3/2021 9:21 AM

## 2021-08-03 NOTE — PROGRESS NOTES
Progress Note    Admit Date: 2021  Day: 4  Diet: ADULT DIET; Dysphagia - Pureed    CC: headache and aphasia    Interval history:   Pt was seen at bedside. Pt vision has been improving with significant motor improvement. He denies any fever, chills, nausea, vomiting   Pt complained of mild headache and jaw pain- cannot open mouth  Spoke to father for update and plan for his son. HPI:  Marika Diaz is a 46 y.o. male with no PMHx who presents with a few weeks of worsening aphasia and headaches. Reports that he has had difficulty finding the right words. He has also had headaches and vision changes. Describes the vision changes as sometimes blurriness and some times partial vision loss. He says the symptoms wax and wane. He takes ibuprofen for the headache but it does not help. Last night he was dropping items at dinner and his father brought him to the [facility] ED. As per father- 1 month aog these problems started with mild facial paralysis, particularly in the upper lip area with visual problem that fluctuated. Concerned with cost he neglected it. His dad helped put constantino for medicade. Symptoms got worse suddenly around the past few days and  had periods of feeling lethargic. Last night having dinner at 1900 the pt felt weak and drop dish of sphaghetti and his cup of water. His dad saw this and immediately brought him to the hospital. In the ED the pt could not remember his name,  etc.  After given the IV steriod, the pt said he knew the answers but couldn't get the answers out and was able to answer the questions correctly. In  his mother had symptoms that involved small projection in the skull and could not find out what it was . One night she had a intense headache and went to ED and found out she had aggressive bone tumors involving ribs pelvis, skull. 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      In the ED, CT revealed multiple cystic masses with minimal thin peripheral enhancement, vasogenic edema, and mass effect with 7mm midline shift. He was transferred to [facility] for further management.     On arrival, he continued to have expressive aphasia but did not have any current complaints. Denies N/V, dizziness, hearing changes, dysphagia. Denies any hx of recurrent infections or immunocompromise. He is a 20-year smoker (unable to vocalize how much). His mother  from brain cancer believed to be lung cancer mets.       Medications:     Scheduled Meds:   levETIRAcetam  500 mg Oral BID    heparin (porcine)  5,000 Units Subcutaneous 3 times per day    cefepime  2,000 mg Intravenous Q8H    vancomycin  1,250 mg Intravenous Q8H    sodium chloride flush  10 mL Intravenous 2 times per day    polyethylene glycol  17 g Oral Daily    sennosides-docusate sodium  1 tablet Oral BID    dexamethasone  4 mg Intravenous Q6H    pantoprazole  40 mg Oral QAM AC     Continuous Infusions:   sodium chloride 25 mL (21 1315)     PRN Meds:sodium chloride flush, sodium chloride, acetaminophen, oxyCODONE **OR** oxyCODONE, naloxone, promethazine **OR** ondansetron, aluminum & magnesium hydroxide-simethicone, bisacodyl, labetalol    Objective:   Vitals:   T-max:  Patient Vitals for the past 8 hrs:   BP Temp Temp src Pulse Resp SpO2   21 0229 (!) 98/59 98.4 °F (36.9 °C) Tympanic 54 16 94 %       Intake/Output Summary (Last 24 hours) at 8/3/2021 0651  Last data filed at 2021 1037  Gross per 24 hour   Intake 240 ml   Output --   Net 240 ml       Review of Systems   All other systems reviewed and are negative. Physical Exam  Constitutional:       Appearance: Normal appearance. HENT:      Head: Normocephalic and atraumatic. Nose: Nose normal.      Mouth/Throat:      Pharynx: Oropharynx is clear. Eyes:      Extraocular Movements: Extraocular movements intact. Conjunctiva/sclera: Conjunctivae normal.      Pupils: Pupils are equal, round, and reactive to light. Cardiovascular:      Rate and Rhythm: Normal rate and regular rhythm. Pulses: Normal pulses. Heart sounds: Normal heart sounds. Pulmonary:      Effort: Pulmonary effort is normal.      Breath sounds: Normal breath sounds. Abdominal:      General: Abdomen is flat. Bowel sounds are normal.      Palpations: Abdomen is soft. Musculoskeletal:         General: Tenderness present. Comments: Bilateral jaw pain   Skin:     General: Skin is warm. Capillary Refill: Capillary refill takes less than 2 seconds. Neurological:      Mental Status: He is alert. Cranial Nerves: Facial asymmetry present. Gait: Gait abnormal.      Comments: Lower left facial droop. Mild gait ataxia. Mildly confused. AOx2         LABS:    CBC:   Recent Labs     08/01/21  0844 08/02/21  0930 08/03/21  0501   WBC 15.2* 10.6 9.2   HGB 14.1 15.0 13.8   HCT 40.0* 43.8 39.0*    287 270   MCV 91.2 93.7 91.4     Renal:    Recent Labs     08/01/21  0423 08/02/21  0528    136   K 4.1 4.5    102   CO2 28 24   BUN 11 16   CREATININE 0.8* 0.7*   GLUCOSE 129* 110*   CALCIUM 9.1 8.9   MG 2.60* 2.80*   PHOS 3.2 3.8   ANIONGAP 10 10     Hepatic:   Recent Labs     08/01/21  0423 08/02/21  0528   LABALBU 4.0 3.8     Troponin:   No results for input(s): TROPONINI in the last 72 hours. BNP: No results for input(s): BNP in the last 72 hours. Lipids: No results for input(s): CHOL, HDL in the last 72 hours. Invalid input(s): LDLCALCU, TRIGLYCERIDE  ABGs:  No results for input(s): PHART, RZM3AQR, PO2ART, PKN2CXI, BEART, THGBART, R7IZWEQH, LSG4XXD in the last 72 hours. INR:   No results for input(s): INR in the last 72 hours. Lactate: No results for input(s): LACTATE in the last 72 hours.   Cultures:  -----------------------------------------------------------------  RAD:   MRI BRAIN W WO CONTRAST   Final Result      Postoperative changes of bilateral frontotemporal craniotomy with partial evacuation of the dominant cystic lesions involving the right temporal, right parietal, and left frontal lobes. The other lesions are unchanged. There is stable associated    vasogenic edema. Improved, minimal residual right-to-left midline shift. Expected postoperative changes of pneumocephalus, minimal hemorrhagic change in the surgical cavity, and trace bilateral cerebral convexity subdural collections. Fluoroscopy modified barium swallow with video   Final Result      As above      CT Head wo Contrast   Final Result   1. Bilateral craniotomy, postsurgical change including pneumocephalus. 2.  No complicating features, hydrocephalus or herniation. CT CHEST ABDOMEN PELVIS WO CONTRAST   Final Result      CHEST:      1. No evidence of metastatic disease in the chest.   2.  No acute intrathoracic abnormality. ABDOMEN/PELVIS:      1. No evidence of metastatic disease in the abdomen and pelvis. 2.  No acute intra-abdominopelvic abnormality. MRI BRAIN W WO CONTRAST   Final Result      1. Bilateral cerebral intra-axial thin ring-enhancing cystic lesions with surrounding vasogenic edema (dominant lesions with incomplete ring enhancement at the cortical gray matter margin) and no internal diffusion restriction. The thin well-defined ring    of enhancement and no internal diffusion restriction raises concern for nonpyrogenic abscesses, such as fungal or paracytic infection. Cystic metastases are a possibility, but considered less likely given the thin well-defined ring enhancement. Incomplete ring of enhancement along the cortical gray matter aspect of the lesions raises the thought of demyelinating disease, but this is unlikely given the hypointense signal on DWI images and the morphology of well-defined thin ring enhancement.    2. Stable mild right subfalcine herniation and uncal herniation                   Assessment/Plan:   Pj Lacy is a 46 y.o. 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 Infectious process or HIV-related etiology (e.g. neurocysticercosis, toxo, CNS lymphoma) also possible and need to r/o HIV especially given prior male with male sexual hx. Mother had history of lung cancer that mets to the bones and skulls. - Brain MRI w/wo-  Bilateral cerebral intra-axial thin ring-enhancing cystic lesions with surrounding vasogenic edema. Stable mild right subfalcine herniation and uncal herniation. - CT C/A/P w/wo: unremarkable  - NSGY consult:07/30 - Stereotactic right frontotemporal craniotomy for evacuation of temporal and parietal cystic lesions. Stereotactic left frontotemporal craniotomy for evacuation of frontal cystic lesion preformed    - decadron 4mg q6h  - Keppra 500mg BID- Now taking PO   - Ele[employer] HOB  - q4h neurochecks  -Heparin subq   - SBP<160: PRN labetalol  - HIV rapid & screen- negative  - repeat CT head for change in neuro exam  -PRN Oxycodone 10mg - 0129 dose this AM  -ID following--awaiting cultures / path  -Vancomycin  2g IV ( started @ 7/31-)  -Cefepime 2g IV ( started at 7/31- )  -immunoglobulin levels WNL  -await CD4 counts    Code Status:Full code  FEN: NPO with sips for meds  PPX: SCDs  DISPO: Tanika Barber MD, PGY-1  08/03/21  6:51 AM    This patient has been staffed and discussed with Munira Gloria MD.

## 2021-08-03 NOTE — PROGRESS NOTES
Occupational Therapy  Facility/Department: [facility] 5T ORTHO/NEURO  Daily Treatment Note  NAME: Ting Medina  : 1980  MRN: [id number]    Date of Service: 8/3/2021    Discharge Recommendations:  Ting Medina scored a 19/24 on the AM-PAC ADL Inpatient form. Current research shows that an AM-PAC score of 18 or greater is typically associated with a discharge to the patient's home setting. Based on the patient's AM-PAC score, and their current ADL deficits, it is recommended that the patient have 2-3 sessions per week of Occupational Therapy at d/c to increase the patient's independence. At this time, this patient demonstrates the endurance and safety to discharge home with 24 hr assist and HHOT and a follow up treatment frequency of 2-3x/wk. Please see assessment section for further patient specific details. HOME HEALTH CARE: LEVEL 1 STANDARD    - Initial home health evaluation to occur within 24-48 hours, in patient home   - Therapy to evaluate with goal of regaining prior level of functioning   - Therapy to evaluate if patient has [address] needs for personal care    If patient discharges prior to next session this note will serve as a discharge summary. Please see below for the latest assessment towards goals. OT Equipment Recommendations  Equipment Needed: No    Assessment   Performance deficits / Impairments: Decreased functional mobility ; Decreased ADL status; Decreased balance;Decreased safe awareness;Decreased strength;Decreased high-level IADLs  Assessment: Pt SBA for functional mobility and transfers this date. Pt slightly impulsive. Pt would benefit from 24 hr assist and HHOT. Continue with POC.   Treatment Diagnosis: impaired ADLs and functional transfers s/p craniotomy  Prognosis: Good  OT Education: OT Role;Plan of Care;Transfer Training  Patient Education: verb understanding  REQUIRES OT FOLLOW UP: Yes  Activity Tolerance  Activity Tolerance: Patient Tolerated treatment well  Safety Devices  Safety Devices in place: Yes  Type of devices: Call light within reach;Nurse notified; All fall risk precautions in place; Bed alarm in place; Left in bed         Patient Diagnosis(es): The encounter diagnosis was S/P craniotomy. has no past medical history on file. has a past surgical history that includes craniotomy (Right, 7/30/2021). Restrictions  Position Activity Restriction  Other position/activity restrictions: seizure precautions, up as tolerated  Subjective   General  Chart Reviewed: Yes  Additional Pertinent Hx: 45 yo male admit to ED 7/30 p/w worsening aphasia and headaches. CT Head (+) multiple cystic massess and vasogenic edema. Undergoing R/L craniotomy for evacuation on 7/30. PMHx: none listed  Response to previous treatment: Patient with no complaints from previous session  Family / Caregiver Present: Yes (dad)  Referring Practitioner: Ellie Lopez MD/  Diagnosis: brain cyst  Subjective  Subjective: Pt in bed upon entry, pt agreeable to therapy session. General Comment  Comments: Pt supine to sit Supervision, pt sit EOB Supervision. Pt sit to stand SBA, pt ambulated SBA ~18 ft to bathroom sink. Pt in stance SBA for oral care/wash face ~5 min. Pt ambulated to sofa SBA no device4 ~15 ft, stand to sit SBA for seated rest break. Pt sit to stand SBA and ambulated in hallway SBA ~375 ft with 1 seated rest break. Back in room pt stand to sit SBA EOB, sit to supine Mod I. Call light in reach, bed alarm on and pt given warm blanket.   Pain Assessment  Pain Level: 10  Pain Location: Head  Pain Orientation: Right;Left  Pain Descriptors: Aching;Discomfort  Pain Frequency: Continuous  Pain Onset: On-going  Clinical Progression: Not changed  Functional Pain Assessment: Prevents or interferes some active activities and ADLs  Pre Treatment Pain Screening  Intervention List: Patient able to continue with treatment  Comments / Details: RN present and administered meds prior  Vital Signs  Patient Currently in Pain: Yes   Orientation  Orientation  Overall Orientation Status:  (aphasia)  Objective    ADL  Grooming: Stand by assistance        Balance  Sitting Balance: Supervision  Standing Balance: Stand by assistance  Standing Balance  Time: ~15 min total  Activity: to/from bathroom, ambulation in hallway, in stance to groom  Functional Mobility  Functional - Mobility Device: No device  Activity: To/from bathroom; Other  Assist Level: Stand by assistance  Bed mobility  Supine to Sit: Supervision  Sit to Supine: Modified independent  Scooting: Supervision  Transfers  Sit to stand: Stand by assistance  Stand to sit: Stand by assistance                       Cognition  Overall Cognitive Status: Exceptions  Arousal/Alertness: Appropriate responses to stimuli  Following Commands: Follows one step commands consistently  Attention Span: Difficulty dividing attention; Attends with cues to redirect  Safety Judgement: Decreased awareness of need for safety  Cognition Comment: limited by aphasia, impulsive and decreased safety awareness                                         Plan   Plan  Times per week: 5-7  Times per day: Daily  Current Treatment Recommendations: Strengthening, Functional Mobility Training, Balance Training, Endurance Training, Safety Education & Training, Self-Care / ADL, Patient/Caregiver Education & Training, Equipment Evaluation, Education, & procurement, Home Management Training  G-Code     OutComes Score                                                  AM-PAC Score        -[facility] Inpatient Daily Activity Raw Score: 19 (08/03/21 1516)  -PAC Inpatient ADL T-Scale Score : 40.22 (08/03/21 1516)  ADL Inpatient CMS 0-100% Score: 42.8 (08/03/21 1516)  ADL Inpatient CMS G-Code Modifier : CK (08/03/21 1516)    Goals  Short term goals  Time Frame for Short term goals: by d/c  Short term goal 1: Patient will complete LB dressing with SPVN and no v/c for optimal alignment - goal not addressed 8/3  Short term goal 2: Patient will complete functional transfers, including commode transfer, with MOD I - goal not met 8/3  Short term goal 3: Patient will complete functional mobility for item retrieval x5 with SPVN - goal not met 8/3       Therapy Time   Individual Concurrent Group Co-treatment   Time In 1342         Time Out 1408         Minutes 26         Timed Code Treatment Minutes: [address] [facility], [address]

## 2021-08-03 NOTE — PROGRESS NOTES
Speech Language Pathology  Facility/Department: [facility] 5T ORTHO/NEURO  Speech Language Daily Treatment Note  NAME: Svetlana Dueñas  : 1980  MRN: [id number]    Date of Eval: 8/3/2021  Evaluating Therapist: Daphnie Brumfield, JUSTINE    Patient Diagnosis(es): has Brain cyst; Somnolence; Dysphasia; and Brain mass on their problem list.      Chart reviewed. Pain: reported jaw pain when chewing solids, but no other complaints of pain     Medical diagnosis: brain masses  Treatment diagnosis: aphasia, apraxia    Subjective: Pt sitting up in bed, requiring cues from SLP to attend to session. Treatment:  Pt seen to address the following goals:  Goal 1: Pt will verbalize personal information with mod cues. 8/2: Independent with first name, required sentence completion task for last name. After this, pt was dependent to state first + last name with anticipatory coarticulation noted and attempts at self-correction phonemic errors. Continue goal.   8/3-  Pt was able to produce his first name independently. Pt had difficulty with producing last name. Pt required Max cues to produce last name including, fill in cues, breaking name down into syllables as well as visual and phonemic cues. Pt had good error awareness but had difficulty correcting errors without cues. con't goal     Goal 2: Pt will complete automatic speech tasks with 50% accuracy or mod cues. 8/2: MAX cues for 50% accuracy with counting 1-10. Limited by reduced initiation / propulsion through task. Continue goal.  8/3- pt required MAX cues for counting to 1-5 (achieved 50% accuracy) and stating the days of the week (achieved 30% accuracy) con't goal       Goal 3: Pt will demonstrate comprehension of mildly complex verbal information with 80% accuracy or min cues. 8/2: Goal not targeted. Continue goal.  8/3-  Pt answered 3 unit yes/no questions with 100% accuracy and multi unit yes/no questions with 100% accuracy.  con't goal     Goal 4: Pt will participate in ongoing cognitive linguistic assessment. 8/2: Pt continued with persistent distant gaze to R and open mouth posturing but improved responsiveness to visually attend to SLP when cued. Trismus noted with reduced mandibular movement during speech tasks, limiting intelligibility at times. Inconsistent responsiveness to cues for increased ROM. Continue goal.  8/3-  Pt required max cues for eye contact and to watch SLP for visual and phonemic cues. Pt was able to write a  short phrase with 80% accuracy with mod cues. con't goal.     Education:  Educated to role of SLP, aphasia, apraxia, skills targeted, and importance of repetition. Pt with questionable comprehension. Assessment: Aphasia, apraxia of speech, cognitive linguistic impairment. Continues with flat affect, slowed initiation, and reduced verbal output. Benefits from phonemic cues. Speech intelligibility limited at times by trismus and reduced movement of articulators. Plan:  Continue speech/language therapy to address above goals, 3-5 x/week x LOS  DC recommendations: ongoing ST recommended  D/W nursing, Dee  Needs met prior to leaving room, call button in reach. RN and Dr. Svitlana Cid at side. Treatment time: 15 min      Cris Duffy [address]  Speech-Language Pathologist  Pager [phone]    If patient is discharged prior to next session, this note will serve as discharge summary.

## 2021-08-03 NOTE — CARE COORDINATION
Case Management Daily Note                    Date: 8/3/2021     Patient Name: Evelio Simon    Date of Admission: 7/30/2021  3:58 AM  YOB: 1980    Length of Stay: 4  GMLOS: 9.44 (AMLOS)         Patient Admission Status: Inpatient  Diagnosis:Brain mass [G93.89]     ________________________________________________________________________________________  Discharge Plan: Home with father, possible home health care. Insurance: Payor: Robby Brown / Plan: Atif Adair / Product Type: *No Product type* /   Is pre-cert/notification needed: yes if placement     Tentative discharge date: 8/4    Current barriers: Path results for ID     Referrals completed: Not Applicable    Resources/ information provided: Not indicated at this time   ________________________________________________________________________________________  PT AM-PAC: 23 / 24 per last evaluation on: 8/2    OT AM-PAC: 23 / 24 per last evaluation on: 8/3    DME Needs for discharge: None   ________________________________________________________________________________________  Notes/Plan of Care:   SW rounded on this date. Plan remains for home with father at discharge. ID awaiting path results for ABX needs. SW following. Preston Mendoza and/or his family were provided with choice of provider; he and/or his family are in agreement with the discharge plan at this time.     Care Transition Patient: PAOLA Navarro  [facility]   Case Management Department  Ph: [phone]

## 2021-08-04 ENCOUNTER — APPOINTMENT (OUTPATIENT)
Dept: CT IMAGING | Age: 41
DRG: 021 | End: 2021-08-04
Attending: INTERNAL MEDICINE
Payer: COMMERCIAL

## 2021-08-04 VITALS
DIASTOLIC BLOOD PRESSURE: 71 MMHG | OXYGEN SATURATION: 98 % | HEIGHT: 70 IN | TEMPERATURE: 97.7 F | BODY MASS INDEX: 23.99 KG/M2 | SYSTOLIC BLOOD PRESSURE: 115 MMHG | WEIGHT: 167.55 LBS | HEART RATE: 53 BPM | RESPIRATION RATE: 16 BRPM

## 2021-08-04 LAB
ALBUMIN SERPL-MCNC: 3.8 G/DL (ref 3.4–5)
ANION GAP SERPL CALCULATED.3IONS-SCNC: 10 MMOL/L (ref 3–16)
BASOPHILS ABSOLUTE: 0 K/UL (ref 0–0.2)
BASOPHILS RELATIVE PERCENT: 0.1 %
BUN BLDV-MCNC: 24 MG/DL (ref 7–20)
CALCIUM SERPL-MCNC: 9 MG/DL (ref 8.3–10.6)
CHLORIDE BLD-SCNC: 100 MMOL/L (ref 99–110)
CO2: 26 MMOL/L (ref 21–32)
CREAT SERPL-MCNC: 0.7 MG/DL (ref 0.9–1.3)
EOSINOPHILS ABSOLUTE: 0 K/UL (ref 0–0.6)
EOSINOPHILS RELATIVE PERCENT: 0 %
GFR AFRICAN AMERICAN: >60
GFR NON-AFRICAN AMERICAN: >60
GLUCOSE BLD-MCNC: 121 MG/DL (ref 70–99)
HCT VFR BLD CALC: 40.3 % (ref 40.5–52.5)
HEMOGLOBIN: 14.1 G/DL (ref 13.5–17.5)
LYMPHOCYTES ABSOLUTE: 1.4 K/UL (ref 1–5.1)
LYMPHOCYTES RELATIVE PERCENT: 16.3 %
MAGNESIUM: 2.6 MG/DL (ref 1.8–2.4)
MCH RBC QN AUTO: 31.7 PG (ref 26–34)
MCHC RBC AUTO-ENTMCNC: 34.9 G/DL (ref 31–36)
MCV RBC AUTO: 90.9 FL (ref 80–100)
MONOCYTES ABSOLUTE: 0.4 K/UL (ref 0–1.3)
MONOCYTES RELATIVE PERCENT: 4.4 %
NEUTROPHILS ABSOLUTE: 7 K/UL (ref 1.7–7.7)
NEUTROPHILS RELATIVE PERCENT: 79.2 %
PDW BLD-RTO: 13.1 % (ref 12.4–15.4)
PHOSPHORUS: 4.6 MG/DL (ref 2.5–4.9)
PLATELET # BLD: 292 K/UL (ref 135–450)
PMV BLD AUTO: 8.3 FL (ref 5–10.5)
POTASSIUM SERPL-SCNC: 4.2 MMOL/L (ref 3.5–5.1)
RBC # BLD: 4.44 M/UL (ref 4.2–5.9)
SODIUM BLD-SCNC: 136 MMOL/L (ref 136–145)
WBC # BLD: 8.9 K/UL (ref 4–11)

## 2021-08-04 PROCEDURE — 6370000000 HC RX 637 (ALT 250 FOR IP)

## 2021-08-04 PROCEDURE — 80069 RENAL FUNCTION PANEL: CPT

## 2021-08-04 PROCEDURE — 85025 COMPLETE CBC W/AUTO DIFF WBC: CPT

## 2021-08-04 PROCEDURE — 97116 GAIT TRAINING THERAPY: CPT

## 2021-08-04 PROCEDURE — 36415 COLL VENOUS BLD VENIPUNCTURE: CPT

## 2021-08-04 PROCEDURE — 2580000003 HC RX 258

## 2021-08-04 PROCEDURE — 97535 SELF CARE MNGMENT TRAINING: CPT

## 2021-08-04 PROCEDURE — 83735 ASSAY OF MAGNESIUM: CPT

## 2021-08-04 PROCEDURE — 6360000002 HC RX W HCPCS: Performed by: STUDENT IN AN ORGANIZED HEALTH CARE EDUCATION/TRAINING PROGRAM

## 2021-08-04 PROCEDURE — 70450 CT HEAD/BRAIN W/O DYE: CPT

## 2021-08-04 PROCEDURE — 6360000002 HC RX W HCPCS

## 2021-08-04 RX ADMIN — HEPARIN SODIUM 5000 UNITS: 5000 INJECTION INTRAVENOUS; SUBCUTANEOUS at 06:43

## 2021-08-04 RX ADMIN — HEPARIN SODIUM 5000 UNITS: 5000 INJECTION INTRAVENOUS; SUBCUTANEOUS at 15:50

## 2021-08-04 RX ADMIN — LEVETIRACETAM 500 MG: 500 TABLET ORAL at 09:53

## 2021-08-04 RX ADMIN — Medication 10 ML: at 09:53

## 2021-08-04 RX ADMIN — DOCUSATE SODIUM 50 MG AND SENNOSIDES 8.6 MG 1 TABLET: 8.6; 5 TABLET, FILM COATED ORAL at 09:53

## 2021-08-04 RX ADMIN — POLYETHYLENE GLYCOL 3350 17 G: 17 POWDER, FOR SOLUTION ORAL at 09:53

## 2021-08-04 RX ADMIN — DEXAMETHASONE SODIUM PHOSPHATE 4 MG: 4 INJECTION, SOLUTION INTRAMUSCULAR; INTRAVENOUS at 09:53

## 2021-08-04 RX ADMIN — DEXAMETHASONE SODIUM PHOSPHATE 4 MG: 4 INJECTION, SOLUTION INTRAMUSCULAR; INTRAVENOUS at 06:43

## 2021-08-04 RX ADMIN — ACETAMINOPHEN 650 MG: 325 TABLET ORAL at 11:28

## 2021-08-04 RX ADMIN — OXYCODONE 5 MG: 5 TABLET ORAL at 02:08

## 2021-08-04 RX ADMIN — PANTOPRAZOLE SODIUM 40 MG: 40 TABLET, DELAYED RELEASE ORAL at 06:42

## 2021-08-04 RX ADMIN — ACETAMINOPHEN 650 MG: 325 TABLET ORAL at 02:08

## 2021-08-04 ASSESSMENT — PAIN DESCRIPTION - PROGRESSION
CLINICAL_PROGRESSION: NOT CHANGED
CLINICAL_PROGRESSION: NOT CHANGED

## 2021-08-04 ASSESSMENT — PAIN - FUNCTIONAL ASSESSMENT
PAIN_FUNCTIONAL_ASSESSMENT: ACTIVITIES ARE NOT PREVENTED
PAIN_FUNCTIONAL_ASSESSMENT: ACTIVITIES ARE NOT PREVENTED

## 2021-08-04 ASSESSMENT — PAIN DESCRIPTION - LOCATION
LOCATION: HEAD

## 2021-08-04 ASSESSMENT — PAIN DESCRIPTION - DESCRIPTORS
DESCRIPTORS: HEADACHE
DESCRIPTORS: HEADACHE

## 2021-08-04 ASSESSMENT — PAIN SCALES - GENERAL
PAINLEVEL_OUTOF10: 1
PAINLEVEL_OUTOF10: 6
PAINLEVEL_OUTOF10: 1
PAINLEVEL_OUTOF10: 2

## 2021-08-04 ASSESSMENT — PAIN DESCRIPTION - FREQUENCY
FREQUENCY: CONTINUOUS
FREQUENCY: CONTINUOUS

## 2021-08-04 ASSESSMENT — PAIN DESCRIPTION - ONSET
ONSET: ON-GOING
ONSET: ON-GOING

## 2021-08-04 ASSESSMENT — PAIN DESCRIPTION - ORIENTATION
ORIENTATION: RIGHT;LEFT
ORIENTATION: RIGHT;LEFT
ORIENTATION: MID

## 2021-08-04 ASSESSMENT — PAIN DESCRIPTION - PAIN TYPE
TYPE: SURGICAL PAIN

## 2021-08-04 NOTE — CARE COORDINATION
CTN contacted Yumiko with [facility] [phone]. They have accepted this patient and will pull referral from [facility]. They will contact patient and make arrangements for General acute hospital'[facility].    Electronically signed by Dimitri Vera LPN on 0/4/7673 at 8:33 PM

## 2021-08-04 NOTE — PROGRESS NOTES
Patient Alert, Oriented to self only. Patient in bed resting. Patient tolerating medications whole. Tolerating Regular, Adult Dysphagia diet well without c/o N/V. Patient has Right AC PIV and Right Hand PIV, dressings clean, dry, intact, normal saline locked. Patient has no c/o. Patient has surgical incisions on both the right and left lateral sides of head. Both incisions approximated, dry, intact with staples and no drainage noted. Patient suffering from aphasia r/t surgical procedure. Call light within reach, able to make needs known and calls out appropriately. Fall precautions in place, will continue to monitor.  Electronically signed by Miguel Caruso on 8/4/2021 at 10:34 AM

## 2021-08-04 NOTE — PROGRESS NOTES
Physician Progress Note      PATIENTCojuan pablo De Santiago  [facility] #:                  [id number]  :                       1980  ADMIT DATE:       2021 3:58 AM  DISCH DATE:  RESPONDING  PROVIDER #:        Sofiya Guardado CNP          QUERY TEXT:    Dear Provider,    Pt admitted with brain lesion, cerebral edema and brain compression. Pt noted   to be confused and disoriented. AMS documented. If possible, please document   in the progress notes and discharge summary if you are evaluating and / or   treating any of the following: The medical record reflects the following:  Risk Factors: 47 yo with brain lesion, cerebral edema and compression. Clinical Indicators: 45 y/o male presenting with 1.5 month history of   progressively worsening HA, mental status, slurred speech, and blurry vision. On  patient had difficulty handling item at dinner table so father brought   the patient to ED. Patient noted to be confused and disoriented. Treatment: Head imaging, IVF, Neuro checks, stereotactic right frontotemporal   craniotomy for evacuation of temporal and parietal cystic lesions and   stereotactic left frontotemporal craniotomy for evacuation of frontal cystic   lesion. Surgical pathology and cultures, fungal and AFB sent  Options provided:  -- Metabolic encephalopathy  -- Other - I will add my own diagnosis  -- Disagree - Not applicable / Not valid  -- Disagree - Clinically unable to determine / Unknown  -- Refer to Clinical Documentation Reviewer    PROVIDER RESPONSE TEXT:    Provider is clinically unable to determine a response to this query.     Query created by: Alisha Clements on 2021 11:41 AM      Electronically signed by:  Sofiya Guardado CNP 2021 1:02 PM

## 2021-08-04 NOTE — CARE COORDINATION
CM following for discharge planning. Discharge order noted. Pt to return home with family and HHC. No IV abx noted on AVS. [facility] orders to be faxed. No further CM needs.      Jacqui Baird RN, BSN, [address]  Case Management Department  [phone]

## 2021-08-04 NOTE — DISCHARGE INSTR - COC
Continuity of Care Form    Patient Name: Marika Diaz   :  1980  MRN:  [id number]    Admit date:  2021  Discharge date:  ***    Code Status Order: Full Code   Advance Directives:     Admitting Physician:  Jordis Goldberg, MD  PCP: No primary care provider on file. Discharging Nurse: [facility] Unit/Room#: 7667/7530-29  Discharging Unit Phone Number: ***    Emergency Contact:   Extended Emergency Contact Information  Primary Emergency Contact: andreina paiz  Address: [address]           [place], [place] [address] Phone: [phone]  Mobile Phone: [phone]  Relation: Parent   needed? No    Past Surgical History:  Past Surgical History:   Procedure Laterality Date    CRANIOTOMY Right 2021    RIGHT FRONTALTEMPORAL CRANIOTOMY FOR EVACUATION OF COLLECTONS AND DIAGNOSIS: LEFT PARIETAL CRANIOTOMY FOR EVACUATION OF COLLECTIONS performed by Rony Shaw MD at [address]       Immunization History: There is no immunization history on file for this patient.     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    Somnolence R40.0    Dysphasia R47.02    Brain mass G93.89    S/P craniotomy Z98.890       Isolation/Infection:   Isolation          No Isolation        Patient Infection Status     None to display          Nurse Assessment:  Last Vital Signs: /68   Pulse 52   Temp 98.1 °F (36.7 °C) (Oral)   Resp 16   Ht 5' 10\" (1.778 m)   Wt 167 lb 8.8 oz (76 kg)   SpO2 97%   BMI 24.04 kg/m²     Last documented pain score (0-10 scale): Pain Level: 2  Last Weight:   Wt Readings from Last 1 Encounters:   21 167 lb 8.8 oz (76 kg)     Mental Status:  {IP PT MENTAL STATUS:}    IV Access:  {[facility] [place]:344635065}    Nursing Mobility/ADLs:  Walking   {CHP DME SIWD:530794943}  Transfer  {CHP DME WAXL:537204906}  Bathing  {CHP DME NOJW:960142437}  Dressing  {CHP DME YEDX:916136107}  Toileting  {CHP DME KQHS:094600502}  Feeding  {P DME XXTP:768169053}  Med Admin  {P DME ORPE:685150188}  Med Delivery   { RONNIE MED Delivery:361782155}    Wound Care Documentation and Therapy:        Elimination:  Continence:   · Bowel: {YES / EM:00249}  · Bladder: {YES / US:42528}  Urinary Catheter: {Urinary Catheter:019436323}   Colostomy/Ileostomy/Ileal Conduit: {YES / TB:09335}       Date of Last BM: ***    Intake/Output Summary (Last 24 hours) at 2021 1309  Last data filed at 2021 0826  Gross per 24 hour   Intake 480 ml   Output --   Net 480 ml     I/O last 3 completed shifts:   In: 56 [P.O.:480; I.V.:10]  Out: -     Safety Concerns:     [employer] Safety Concerns:047063267}    Impairments/Disabilities:      [employer] Impairments/Disabilities:643059872}    Nutrition Therapy:  Current Nutrition Therapy:   [employer] Diet List:170202411}    Routes of Feeding: {[facility] DME Other Feedings:281648174}  Liquids: {Slp liquid thickness:01642}  Daily Fluid Restriction: {P DME Yes amt example:498849402}  Last Modified Barium Swallow with Video (Video Swallowing Test): {Done Not Done GZDZ:146390113}    Treatments at the Time of Hospital Discharge:   Respiratory Treatments: ***  Oxygen Therapy:  {Therapy; copd oxygen:37485}  Ventilator:    { CC Vent KCGB:897065141}    Rehab Therapies: {THERAPEUTIC INTERVENTION:8849102597}  Weight Bearing Status/Restrictions: [employer]  Weight Bearin}  Other Medical Equipment (for information only, NOT a DME order):  {EQUIPMENT:674600379}  Other Treatments: ***    Patient's personal belongings (please select all that are sent with patient):  {[facility] DME Belongings:383059206}    RN SIGNATURE:  {Esignature:791513876}    CASE MANAGEMENT/SOCIAL WORK SECTION    Inpatient Status Date: ***    Readmission Risk Assessment Score:  Readmission Risk              Risk of Unplanned Readmission:  13           Discharging to Facility/ Agency   · Name: · Address:  · Phone:  · Fax:    Dialysis Facility (if applicable)   · Name:  · Address:  · Dialysis Schedule:  · Phone:  · Fax:    / signature: {Esignature:602415442}    PHYSICIAN SECTION    Prognosis: {Prognosis:5775514130}    Condition at Discharge: Dionisio Caro Patient Condition:293056007}    Rehab Potential (if transferring to Rehab): {Prognosis:0869183586}    Recommended Labs or Other Treatments After Discharge: ***    Physician Certification: I certify the above information and transfer of Marika Marrow  is necessary for the continuing treatment of the diagnosis listed and that he requires {Admit to Appropriate Level of Care:10675} for {GREATER/LESS:671435746} 30 days.      Update Admission H&P: {CHP DME Changes in HVVMX:639283497}    PHYSICIAN SIGNATURE:  {Esignature:100475742}

## 2021-08-04 NOTE — PROGRESS NOTES
Occupational Therapy  Facility/Department: [facility] 5T ORTHO/NEURO  Daily Treatment Note  NAME: Rik Hines  : 1980  MRN: [id number]    Date of Service: 2021    Discharge Recommendations:  Rik Hines scored a 19/24 on the AM-PAC ADL Inpatient form. Current research shows that an AM-PAC score of 18 or greater is typically associated with a discharge to the patient's home setting. Based on the patient's AM-PAC score, and their current ADL deficits, it is recommended that the patient have 2-3 sessions per week of Occupational Therapy at d/c to increase the patient's independence. At this time, this patient demonstrates the endurance and safety to discharge home with 24 hr assist and HHOT and a follow up treatment frequency of 2-3x/wk. Please see assessment section for further patient specific details. HOME HEALTH CARE: LEVEL 1 STANDARD    - Initial home health evaluation to occur within 24-48 hours, in patient home   - Therapy to evaluate with goal of regaining prior level of functioning   - Therapy to evaluate if patient has [address] needs for personal care  If patient discharges prior to next session this note will serve as a discharge summary. Please see below for the latest assessment towards goals. OT Equipment Recommendations  Equipment Needed: No    Assessment   Performance deficits / Impairments: Decreased functional mobility ; Decreased ADL status; Decreased balance;Decreased safe awareness;Decreased strength;Decreased high-level IADLs  Assessment: Pt with shower level ADL this date, pt slightly impulsive and vcs for safety. Pt CGA for some LB ADLs. Pt would benefit from 24 hr assist and HHOT. Continue with POC.   Treatment Diagnosis: impaired ADLs and functional transfers s/p craniotomy  Prognosis: Good  OT Education: OT Role;Plan of Care;Transfer Training;ADL Adaptive Strategies  Patient Education: demonstrate understanding  REQUIRES OT FOLLOW UP: Yes  Activity Tolerance  Activity Tolerance: Patient Tolerated treatment well  Safety Devices  Safety Devices in place: Yes  Type of devices: Call light within reach;Nurse notified; All fall risk precautions in place; Chair alarm in place; Left in chair         Patient Diagnosis(es): The encounter diagnosis was S/P craniotomy. has no past medical history on file. has a past surgical history that includes craniotomy (Right, 7/30/2021). Restrictions  Position Activity Restriction  Other position/activity restrictions: seizure precautions, up as tolerated  Subjective   General  Chart Reviewed: Yes  Additional Pertinent Hx: 47 yo male admit to ED 7/30 p/w worsening aphasia and headaches. CT Head (+) multiple cystic massess and vasogenic edema. Undergoing R/L craniotomy for evacuation on 7/30. PMHx: none listed  Response to previous treatment: Patient with no complaints from previous session  Family / Caregiver Present: No  Referring Practitioner: Bart Lang MD/  Diagnosis: brain cyst  Subjective  Subjective: Pt in bed upon entry, pt agreeable to therapy session/ADL shower. General Comment  Comments: Pt supine to sit Supervision, pt sit EOB Supervision. Pt sit to stand SBA, pt ambulated SBA ~18 ft to bathroom toilet. Pt toilet transfer SBA and pt toileted SBA. Pt shower transfer CGA. Pt washed UB setup/SBA and LB setup/CGA in stance and for figure 4. Pt donned gown. Pt donned brief and hospital pants, CGA in stance to pull up due to pt with wide legged squat during adjusting brief. Pt donned  socks CGA with figure 4 on showr chair. Pt in stance at sink SBA for oral care. Pt ambulated ~18 ft to recliner SBA. Pt stand to sit SBA. Pt served lunch and ate at setup. Call light in reach, chair alarm on and pt served lunch.   Pain Assessment  Pain Type: Surgical pain  Pain Location: Head  Pain Orientation: Right;Left  Pain Descriptors: Headache  Pain Frequency: Continuous  Pain Onset: On-going  Clinical Progression: Not changed  Functional Pain Assessment: Activities are not prevented  Pre Treatment Pain Screening  Intervention List: Patient able to continue with treatment;Nurse called to administer meds  Vital Signs  Patient Currently in Pain: Yes (\"my head hurts some\")   Orientation  Orientation  Overall Orientation Status:  (aphasia)  Orientation Level: Unable to assess  Objective    ADL  Grooming: Stand by assistance  UE Bathing: Stand by assistance;Setup  LE Bathing: Contact guard assistance;Setup  LE Dressing: Verbal cueing;Contact guard assistance;Setup  Toileting: Stand by assistance        Balance  Sitting Balance: Supervision  Standing Balance: Stand by assistance (SBA, CGA LB ADLs)  Standing Balance  Time: ~12 min total  Activity: to/from bathroom, in stance oral care, LB ADLs and shower/toilet transfer  Functional Mobility  Functional - Mobility Device: No device  Activity: To/from bathroom  Assist Level: Stand by assistance  Toilet Transfers  Toilet - Technique: Ambulating  Equipment Used: Standard toilet  Toilet Transfer: Stand by assistance  Shower Transfers  Shower - Transfer From: Walker  Shower - Transfer Type: To and From  Shower - Transfer To:  Shower seat with back  Shower - Technique: Ambulating  Shower Transfers: Contact Guard  Shower Transfers Comments: use of grab bar  Bed mobility  Supine to Sit: Supervision  Scooting: Supervision  Transfers  Sit to stand: Stand by assistance  Stand to sit: Stand by assistance                       Cognition  Overall Cognitive Status: Exceptions  Safety Judgement: Decreased awareness of need for safety  Cognition Comment: limited by aphasia, impulsive and decreased safety awareness                                         Plan   Plan  Times per week: 5-7  Times per day: Daily  Current Treatment Recommendations: Strengthening, Functional Mobility Training, Balance Training, Endurance Training, Safety Education & Training, Self-Care / ADL, Patient/Caregiver Education & Training, Equipment Evaluation, Education, & procurement, Home Management Training  G-Code     OutComes Score                                                  AM-PAC Score        AM-PAC Inpatient Daily Activity Raw Score: 19 (08/04/21 1215)  AM-PAC Inpatient ADL T-Scale Score : 40.22 (08/04/21 1215)  ADL Inpatient CMS 0-100% Score: 42.8 (08/04/21 1215)  ADL Inpatient CMS G-Code Modifier : CK (08/04/21 1215)    Goals  Short term goals  Time Frame for Short term goals: by d/c  Short term goal 1: Patient will complete LB dressing with SPVN and no v/c for optimal alignment - goal not addressed 8/3, not met 8/4  Short term goal 2: Patient will complete functional transfers, including commode transfer, with MOD I - goal not met 8/3, 8/4  Short term goal 3: Patient will complete functional mobility for item retrieval x5 with SPVN - goal not met 8/3, 8/4       Therapy Time   Individual Concurrent Group Co-treatment   Time In 1125         Time Out 1203         Minutes 38         Timed Code Treatment Minutes: George, [address]

## 2021-08-04 NOTE — PROGRESS NOTES
Progress Note    Admit Date: 2021  Day: 5  Diet: ADULT DIET; Dysphagia - Pureed    CC: headache and aphasia    Interval history:   Pt was seen at bedside. Pt still has visual field defect in the left lateral visual field. He denies any fever, chills, nausea, vomiting   Pt still complained of mild headache and jaw pain  Pt went in for CT head- showed mild edema with reduced midline shift. Spoke to father about pathology report pending. HPI:  Adonay Sousa is a 46 y.o. male with no PMHx who presents with a few weeks of worsening aphasia and headaches. Reports that he has had difficulty finding the right words. He has also had headaches and vision changes. Describes the vision changes as sometimes blurriness and some times partial vision loss. He says the symptoms wax and wane. He takes ibuprofen for the headache but it does not help. Last night he was dropping items at dinner and his father brought him to the [facility] ED. As per father- 1 month aog these problems started with mild facial paralysis, particularly in the upper lip area with visual problem that fluctuated. Concerned with cost he neglected it. His dad helped put constantino for medicade. Symptoms got worse suddenly around the past few days and  had periods of feeling lethargic. Last night having dinner at 1900 the pt felt weak and drop dish of sphaghetti and his cup of water. His dad saw this and immediately brought him to the hospital. In the ED the pt could not remember his name,  etc.  After given the IV steriod, the pt said he knew the answers but couldn't get the answers out and was able to answer the questions correctly. In  his mother had symptoms that involved small projection in the skull and could not find out what it was . One night she had a intense headache and went to ED and found out she had aggressive bone tumors involving ribs pelvis, skull. 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      In the ED, CT revealed multiple cystic masses with minimal thin peripheral enhancement, vasogenic edema, and mass effect with 7mm midline shift. He was transferred to [facility] for further management.     On arrival, he continued to have expressive aphasia but did not have any current complaints. Denies N/V, dizziness, hearing changes, dysphagia. Denies any hx of recurrent infections or immunocompromise. He is a 20-year smoker (unable to vocalize how much). His mother  from brain cancer believed to be lung cancer mets.       Medications:     Scheduled Meds:   levETIRAcetam  500 mg Oral BID    heparin (porcine)  5,000 Units Subcutaneous 3 times per day    sodium chloride flush  10 mL Intravenous 2 times per day    polyethylene glycol  17 g Oral Daily    sennosides-docusate sodium  1 tablet Oral BID    dexamethasone  4 mg Intravenous Q6H    pantoprazole  40 mg Oral QAM AC     Continuous Infusions:   sodium chloride 25 mL (21 1315)     PRN Meds:sodium chloride flush, sodium chloride, acetaminophen, oxyCODONE **OR** oxyCODONE, naloxone, promethazine **OR** ondansetron, aluminum & magnesium hydroxide-simethicone, bisacodyl, labetalol    Objective:   Vitals:   T-max:  Patient Vitals for the past 8 hrs:   BP Temp Temp src Pulse Resp SpO2   21 0259 116/67 97.9 °F (36.6 °C) Oral 60 16 95 %   21 2330 110/71 98 °F (36.7 °C) Oral 52 16 98 %       Intake/Output Summary (Last 24 hours) at 2021 5755  Last data filed at 8/3/2021 1119  Gross per 24 hour   Intake 250 ml   Output --   Net 250 ml       Review of Systems   All other systems reviewed and are negative. Physical Exam  Constitutional:       Appearance: Normal appearance. HENT:      Head: Normocephalic. Comments: Stitches from craniotomy     Nose: Nose normal.      Mouth/Throat:      Pharynx: Oropharynx is clear. Eyes:      Extraocular Movements: Extraocular movements intact.       Conjunctiva/sclera: Conjunctivae normal.      Pupils: Pupils are equal, round, and reactive to light. Cardiovascular:      Rate and Rhythm: Normal rate and regular rhythm. Pulses: Normal pulses. Heart sounds: Normal heart sounds. Pulmonary:      Effort: Pulmonary effort is normal.      Breath sounds: Normal breath sounds. Abdominal:      General: Abdomen is flat. Bowel sounds are normal.      Palpations: Abdomen is soft. Tenderness: There is abdominal tenderness. Musculoskeletal:         General: Tenderness present. Comments: Bilateral jaw pain   Skin:     General: Skin is warm. Capillary Refill: Capillary refill takes less than 2 seconds. Neurological:      Mental Status: He is alert. Cranial Nerves: Facial asymmetry present. Gait: Gait abnormal.      Comments: Lower left nasal droop. Mild gait ataxia. Mildly confused and speaks slow. Left lateral visual field defect. LABS:    CBC:   Recent Labs     08/02/21  0930 08/03/21  0501 08/04/21  0531   WBC 10.6 9.2 8.9   HGB 15.0 13.8 14.1   HCT 43.8 39.0* 40.3*    270 292   MCV 93.7 91.4 90.9     Renal:    Recent Labs     08/02/21  0528 08/03/21  0501 08/04/21  0531    136 136   K 4.5 4.5 4.2    102 100   CO2 24 24 26   BUN 16 18 24*   CREATININE 0.7* 0.7* 0.7*   GLUCOSE 110* 114* 121*   CALCIUM 8.9 8.9 9.0   MG 2.80* 2.50* 2.60*   PHOS 3.8 3.5 4.6   ANIONGAP 10 10 10     Hepatic:   Recent Labs     08/02/21  0528 08/03/21  0501 08/04/21  0531   LABALBU 3.8 3.7 3.8     Troponin:   No results for input(s): TROPONINI in the last 72 hours. BNP: No results for input(s): BNP in the last 72 hours. Lipids: No results for input(s): CHOL, HDL in the last 72 hours. Invalid input(s): LDLCALCU, TRIGLYCERIDE  ABGs:  No results for input(s): PHART, SPR3TZV, PO2ART, KFP3UGI, BEART, THGBART, A0IXHYDP, OOX0WXM in the last 72 hours. INR:   No results for input(s): INR in the last 72 hours.   Lactate: No results for input(s): LACTATE in the last 72 hours.  Cultures:  -----------------------------------------------------------------  RAD:   MRI BRAIN W WO CONTRAST   Final Result      Postoperative changes of bilateral frontotemporal craniotomy with partial evacuation of the dominant cystic lesions involving the right temporal, right parietal, and left frontal lobes. The other lesions are unchanged. There is stable associated    vasogenic edema. Improved, minimal residual right-to-left midline shift. Expected postoperative changes of pneumocephalus, minimal hemorrhagic change in the surgical cavity, and trace bilateral cerebral convexity subdural collections. Fluoroscopy modified barium swallow with video   Final Result      As above      CT Head wo Contrast   Final Result   1. Bilateral craniotomy, postsurgical change including pneumocephalus. 2.  No complicating features, hydrocephalus or herniation. CT CHEST ABDOMEN PELVIS WO CONTRAST   Final Result      CHEST:      1. No evidence of metastatic disease in the chest.   2.  No acute intrathoracic abnormality. ABDOMEN/PELVIS:      1. No evidence of metastatic disease in the abdomen and pelvis. 2.  No acute intra-abdominopelvic abnormality. MRI BRAIN W WO CONTRAST   Final Result      1. Bilateral cerebral intra-axial thin ring-enhancing cystic lesions with surrounding vasogenic edema (dominant lesions with incomplete ring enhancement at the cortical gray matter margin) and no internal diffusion restriction. The thin well-defined ring    of enhancement and no internal diffusion restriction raises concern for nonpyrogenic abscesses, such as fungal or paracytic infection. Cystic metastases are a possibility, but considered less likely given the thin well-defined ring enhancement.     Incomplete ring of enhancement along the cortical gray matter aspect of the lesions raises the thought of demyelinating disease, but this is unlikely given the hypointense signal on DWI images and the morphology of well-defined thin ring enhancement. 2. Stable mild right subfalcine herniation and uncal herniation                   Assessment/Plan:   Kiana Freeman is a 46 y.o. 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 Infectious process or HIV-related etiology (e.g. neurocysticercosis, toxo, CNS lymphoma) also possible and need to r/o HIV especially given prior male with male sexual hx. Mother had history of lung cancer that mets to the bones and skulls. - Brain MRI w/wo-  Bilateral cerebral intra-axial thin ring-enhancing cystic lesions with surrounding vasogenic edema. Stable mild right subfalcine herniation and uncal herniation. - CT C/A/P w/wo: unremarkable  - NSGY consult: - Stereotactic right frontotemporal craniotomy for evacuation of temporal and parietal cystic lesions. Stereotactic left frontotemporal craniotomy for evacuation of frontal cystic lesion preformed    - decadron 4mg q6h  - Keppra 500mg BID- Now taking PO   - Elevate HOB  - q4h neurochecks  -Heparin subq   - SBP<160: PRN labetalol- none given overnight as bp is stable.    - HIV rapid & screen- negative  - repeat CT head for change in neuro exam  -PRN Oxycodone 10mg - 0208 dose this AM  -ID following--awaiting path- cultures only showed staph epi- likely contaminate   -Vancomycin  2g IV ( started @ -)  -Cefepime 2g IV ( started at - )  -immunoglobulin levels WNL  -CD4 counts -CD4 (T-[place] Cells)                                 cu mm       415 normal= 410 - 1590   -CD8 (T-Suppressor Cells)                                    cu mm       441  normal= 190 - 1140     Code Status:Full code  FEN: NPO with sips for meds  PPX: SCDs  DISPO: Mario Romero MD, PGY-1  21  6:48 AM    This patient has been staffed and discussed with

## 2021-08-04 NOTE — PROGRESS NOTES
Pt is alert to self, but otherwise hard to assess d/t aphasia. VSS. Neuro checks unchanged. C/o pain, but denies wanting pain medication at this time. Resting in the chair. All fall precautions in place. Call light within reach. Pt denies any needs at this time.

## 2021-08-04 NOTE — PROGRESS NOTES
Clinical Pharmacy Progress Note    46 y.o. male admitted with intraparenchymal cystic lesions. Pharmacy has been asked by Dr. Efra Jacques to adjust all drips to normal saline as appropriate based on compatibility to avoid fluid shifts since D5 is osmotically active. The following intermittent IV drips/infusions have been adjusted to saline:  None at this time    Total IV fluid delivered to patient over last 24h: 250 mL    RPh will follow daily to ensure all new IVPBs + drips are in NS. Please call with questions.   Dejuan Candelario PharmD, BCPS  Wireless: [id number]   8/4/2021 10:32 AM

## 2021-08-04 NOTE — PROGRESS NOTES
PT Education: PT Role;General Safety  Patient Education: Pt verbalized understanding  Activity Tolerance  Activity Tolerance: Patient Tolerated treatment well     Patient Diagnosis(es): The encounter diagnosis was S/P craniotomy. has no past medical history on file. has a past surgical history that includes craniotomy (Right, 7/30/2021). Restrictions  Position Activity Restriction  Other position/activity restrictions: seizure precautions, up as tolerated     Subjective   General  Chart Reviewed: Yes  Additional Pertinent Hx: 47 yo male admit to ED 7/30 p/w worsening aphasia and headaches. CT Head (+) multiple cystic massess and vasogenic edema. Undergoing R/L craniotomy for evacuation on 7/30. PMHx: none listed  Family / Caregiver Present: No  Subjective  Subjective: Pt sitting in chair upon arrival and agreeable to therapy. Pain Screening  Patient Currently in Pain: Denies  Vital Signs  Patient Currently in Pain: Denies     Objective   Bed mobility  Sit to Supine: Modified independent  Transfers  Sit to Stand: SBA (from the chair) CGA (from the commode)  Stand to sit: SBA  (to the bed) CGA (to commode)    Ambulation  Ambulation?: Yes  WB Status: FWB  Ambulation 1  Surface: level tile  Device: No Device  Assistance: Contact guard assistance  Quality of Gait: Narrow LV, veers R with gait at times, lateral side stepping walking out of room but able to self correct.   Gait Deviations: Slow Lupe;Decreased step length  Distance: 400'  Stairs/Curb  Stairs?: Yes  Stairs  # Steps : 11  Stairs Height: 6\"  Rails: Right ascending  Device: No Device  Assistance: Contact guard assistance  Comment: step over step pattern     Balance  Sitting - Static: Good  Standing - Static: Fair;+  Standing - Dynamic: Fair;+  Comments: SBA for static standing    AM-PAC Score  AM-PAC Inpatient Mobility Raw Score : 20 (08/04/21 1411)  AM-PAC Inpatient T-Scale Score : 47.67 (08/04/21 1411)  Mobility Inpatient CMS 0-100% Score:

## 2021-08-04 NOTE — PLAN OF CARE
Problem: Pain:  Goal: Pain level will decrease  Description: Pain level will decrease  Outcome: Ongoing  Pt c/o minimal pain at this time. Rating it 1/10 and a headache. Pt denies wanting medication at this time. Will continue to monitor. Problem: Falls - Risk of:  Goal: Will remain free from falls  Description: Will remain free from falls  Outcome: Ongoing  Fall precautions in place. Bed is in lowest position, wheels locked, bed alarm on, non skid socks on. Call light and bedside table within reach. Pt calls out appropriately. Pt is up x1 SBA with gb. Will continue to assess and monitor.      Problem: Verbal Communication - Impaired:  Goal: Functional communication will improve  Description: Functional communication will improve  Outcome: Ongoing

## 2021-08-04 NOTE — DISCHARGE SUMMARY
INTERNAL MEDICINE DEPARTMENT AT [address] [place] [address]  DISCHARGE SUMMARY    Patient ID: Kaylyn Tracy                                             Discharge Date: 8/4/2021   Patient's PCP: No primary care provider on file. Discharge Physician: Emmett Yanes MD MD  Admit Date: 7/30/2021   Admitting Physician: Jana Marr MD    PROBLEMS DURING HOSPITALIZATION:  Present on Admission:   Brain cyst   Somnolence   Dysphasia   Brain mass   S/P craniotomy      DISCHARGE DIAGNOSES:    HPI:    Kaylyn Tracy is a 46year old male with no significant past medical history who presents with a 1 month history of worsening aphasia and headache. Patient endorses that he has difficulty finding the right words, but \"knows\" what he wants to say. He also endorses worsening headaches and vision changes. Patient describes the vision changes as sometimes blurry, and sometimes partial vision loss. Patient reports that his symptoms wax and wanes; he reports taking ibuprofen for the headache, but endorses that it does not help. Last night the patient was dropping items at dinner which prompted his father to take him to the [facility] ED. As per father - Patient has had these symptoms for 1 month, and initially began as mild facial paralysis primarily in the upper lip, and fluctuating vision problems. Concerned with the cost of medical care he neglected the problem. Father then helped the patient submit an application for medicaid. Patient symptom's continued to progressively worsen over the past few days with periods of lethargy. Last night while having dinner at 1900 the patient began to feel weak and dropped a dish of spaghetti and a cup of water. Father witnessed this and immediately brought him to the hospital. In the ED the patient could not remember his name and date of birth.  After being given IV steroids the patient said he knew the answer to the questions, but was unable to express his thoughts. In  the patient mother's had symptoms that involved small projections in the skull and could not find out what it was. One night she had an intense headache and went to the ED and found out she had aggressive bone tumors involving ribs, pelvis, skull. This was later found out to be metastatic from the lungs. In the ED a CT scan revealed multiple thin walled cystic masses, with surrounding peripheral edema, and mass effect with 7 mm midline shift. Patient was transferred to [facility] for further management. On arrival the patient continued to have expressive aphasia, but did not have any current complaints. The patient denies nausea, vomiting, dizziness, hearing changes, dysphagia. Patient denies history of recurrent infections or immunocompromise. Patient is a 20-year smoker (unable to vocalize how much he smokes on average). Patient's mother  from brain cancer, believed to originate from metastatic primary lung cancer. On 2021 the patient received an MRI to further define the CNS lesions, and a CT scan of the chest, abdomen and pelvis to determine possible origins of primary cancer. Patient was started on Keppra 500mg BID for seizure prophylaxis, and decadron 4mg q6h to reduce bran parenchymal swelling. Patient also received HIV screening due to history of same sex sex; screen was negative. Patient's CT chest, abdomen and pelvis showed no evidence of metastatic disease in the chest, abdomen or pelvic, nor were any acute intrathoracic, or intra-abdominopelvic abnormalities identified on CT. Patient's MRI bilateral cerebral intra-axial thin ring-enhancing cystic lesions with surrounding vasogenic edema (dominant lesions with incomplete ring enhancement at the cortical gray matter margin) and no internal diffusion restriction.  MRI also identified a left midline shift measuring approximately 6 mm at the level of the cavum septum pellucidum and minimal right uncal herniation. Given these findings patient underwent urgent right stereotactic fronto-temporal craniotomy for evacuation of temporal and parietal cystic lesions, and left stereotactic frontotemporal craniotomy for evacuation of frontal cystic lesions. The cyst was found to be extensively loculated with numerous membranes lined with necrotic appearing whitish materials, which were cleared circumferentially within the cavity with cautery and suction. The cystic fluid was found to be clear and yellow. Samples from this cyst and cystic fluid was sent for pathology. Patient was transferred to the ICU where the patient was monitored over the weekend before being transferred to the general medicine floor. In the ICU the patient's WBC went up from 8.6 to 18.8, likely secondary to steroid initiation and post-surgical inflammation. Patient was started on cefepime and vancomycin, and patient's bed was elevated to >30 degrees. Patient's post-operative CT and MRI identified expected postoperative changes of pneumocephalus, minimal hemorrhagic changes int he surgical cavity, and trace bilateral cerebral convexity and subdural collections. In the ICU patient's blood pressures and vitals were closely monitored for changes that may indicate increased intracranial pressure, with goal systolic blood pressure less than 160 with PRN Labetalol. Regular neuro-checks were performed. On POD3 patient was cleared to the general medicine floor with pharmacy dosing Vancomycin and Cefepime. At bedside patient was found to be better oriented and of improved affect. Patient continued to have left temporal hemianopsia worse in the superior quadrant (likely secondary to injury to cartwright's loop in the right temporal lobe). Patient had improved muscle strength in the upper and lower extremities.  Patient continues to have difficulty \"finding\" the right words, likely secondary to damage to broca's area or arcuate fasciculus. The cystic fluid was sent for cultures; no white blood cells or organisms were seen on gram stain. No aerobic or anaerobic growth was detected at 36 or 48 hours; samples were held for 3 weeks. No acid fast bacteria were observed by fluorescent stain. No fungal elements were seen either. The fluid was found to be a hypocellular specimen with red blood cells, macrophages and occasional lymphocytes; pathology was unable to identify any malignant cells. Patient's viral tests were negative for HIV-1 antibody, HIV Ag/Ab, HIV antigen, HIV-2 Ab. Patient's IgA and IgM levels were 136.0 and 86.0, respectively. Patient was non-reactive for Hep A IgM, Hep B S Ag, Hep C Ab, Hep B Core Ab IgM. On POD5 Cefepime and Vancomycin were discontinued and patient was discharged, awaiting pathology results of tissue sample from right and left frontoparietal craniotomy.        The following issues were addressed during hospitalization:    Physical Exam:  /68   Pulse 52   Temp 98.1 °F (36.7 °C) (Oral)   Resp 16   Ht 5' 10\" (1.778 m)   Wt 167 lb 8.8 oz (76 kg)   SpO2 97%   BMI 24.04 kg/m²       Consults: ID and neurosurgery  Significant Diagnostic Studies:  MRI head, CT Chest Abdomen Pelvic, CT Head  Treatments: antibiotics: vancomycin and Cefepime; Steroids: Decadron; Seizure Prevention: Keppra  Disposition: home  Discharged Condition: Stable  Follow Up: Primary Care Physician in one week    DISCHARGE MEDICATION:     Medication List      START taking these medications    dexamethasone 4 MG tablet  Commonly known as: DECADRON  Take 1 tablet by mouth every 8 hours for 6 doses Followed by 1 tablet every 12 hours x 4 doses  Followed by 1 tablet daily x 2 doses     levETIRAcetam 500 MG tablet  Commonly known as: KEPPRA  Take 1 tablet by mouth 2 times daily for 15 days     oxyCODONE 5 MG immediate release tablet  Commonly known as: ROXICODONE  Take 1 tablet by mouth every 6 hours as needed for Pain for up to 7

## 2021-08-04 NOTE — PROGRESS NOTES
NEUROSURGERY POST-OP PROGRESS NOTE    Patient Name: Karthik Jones YOB: 1980   Sex: Male Age: 46 yrs     Medical Record Number: [id number] Acct Number: [de-identified]   Room Number: 9009/8715-91 Hospital Day: Hospital Day: 6     Interval History:  Post-operative Day# 5 s/p Procedure(s) (LRB):  RIGHT FRONTALTEMPORAL CRANIOTOMY FOR EVACUATION OF COLLECTONS AND DIAGNOSIS: LEFT PARIETAL CRANIOTOMY FOR EVACUATION OF COLLECTIONS (Right)    Subjective: Pt able to answer some questions with one word answers. He is eating breakfast when I arrived. When asked how he was feeling he shrugged his shoulders. Objective:    VITAL SIGNS   /64   Pulse 51   Temp 98 °F (36.7 °C) (Oral)   Resp 14   Ht 5' 10\" (1.778 m)   Wt 176 lb 2.4 oz (79.9 kg)   SpO2 97%   BMI 25.27 kg/m²    Height Height: 5' 10\" (177.8 cm)   Weight Weight: 176 lb 2.4 oz (79.9 kg)        Allergies No Known Allergies   NPO Status ADULT DIET; Dysphagia - Pureed   Isolation No active isolations     LABS   Basic Metabolic Profile Recent Labs     08/02/21  0528 08/03/21  0501 08/04/21  0531    136 136    102 100   CO2 24 24 26   BUN 16 18 24*   CREATININE 0.7* 0.7* 0.7*   GLUCOSE 110* 114* 121*   PHOS 3.8 3.5 4.6   MG 2.80* 2.50* 2.60*      Complete Blood Count Recent Labs     08/02/21  0930 08/03/21  0501 08/04/21  0531   WBC 10.6 9.2 8.9   RBC 4.67 4.27 4.44      Coagulation Studies No results for input(s): PTT, INR in the last 72 hours.     Invalid input(s): PLATELETS, PROA, PT, PTTA     MEDICATIONS   Inpatient Medications     levETIRAcetam, 500 mg, Oral, BID    heparin (porcine), 5,000 Units, Subcutaneous, 3 times per day    sodium chloride flush, 10 mL, Intravenous, 2 times per day    polyethylene glycol, 17 g, Oral, Daily    sennosides-docusate sodium, 1 tablet, 6  4. Seizure Prophylaxis:keppra 500 BID  5. CT head today  6. DVT Prophylaxis: SCDs   7. GI Prophylaxis:protonix  8. Bowel Regimen: Glycolax, Senna  9. Pain control: PRN Tylenol, Oxycodone  10. Incisional Care: RICARDO  11. Dispo Planning: pending pathology results      Patient was seen with Dr. Tee Ocasio who agrees with above assessment and plan. Electronically signed by:  NITIN Yanez CNP, 8/4/2021 9:55 AM   Neurosurgery Nurse Practitioner  [phone]

## 2021-08-04 NOTE — PROGRESS NOTES
Neurosurgery Progress Note    Patient seen and examined on 08/04/21. No acute events overnight. Oriented to person. Continues to have word finding difficulty.        A/P: 45 yo M POD #4 s/p right frontotemporal craniotomy for evacuation of collections and left parietal craniotomy for evacuation of collections.        - Neurologic exam frequency: Q4H  - Mobility:PT/OT, OOB as tolerated   - Steroids: Decadron 4mg Q6H  - Seizure Prophylaxis: Keppra 500mg BID   - Antibiotics per Infectious Disease  - DVT Prophylaxis: SCDs   - GI Prophylaxis: Protonix  - Bowel Regimen: Glycolax, Senna  - Pain control: PRN Tylenol, Oxycodone  - Dispo Planning: pending pathology     BOB Juarez [facility] [address], [place] [facility], [place], [address] [facility] (o)

## 2021-08-05 LAB — IGE: 40 KU/L

## 2021-08-16 ENCOUNTER — HOSPITAL ENCOUNTER (OUTPATIENT)
Dept: SPEECH THERAPY | Age: 41
Setting detail: THERAPIES SERIES
Discharge: HOME | End: 2021-08-16
Payer: COMMERCIAL

## 2021-08-16 DIAGNOSIS — R47.01 APHASIA: Primary | ICD-10-CM

## 2021-08-16 DIAGNOSIS — R48.2 APRAXIA OF SPEECH: ICD-10-CM

## 2021-08-16 PROCEDURE — 92523 SPEECH SOUND LANG COMPREHEN: CPT

## 2021-08-16 NOTE — PROGRESS NOTES
Speech Language Pathology  Facility/Department: [facility]  Initial Assessment    NAME: Jade Howard  : 1980  MRN: [id number]    Date of Eval: 2021  Evaluating Therapist: Claudette Agreste, MS CCC/SLP [id number]  Speech Language Pathologist  Visit Diagnoses       Codes    Aphasia    -  Primary R47.01    Apraxia of speech     R48.2          Past Medical History: has no past medical history on file. Past Surgical History:  has a past surgical history that includes craniotomy (Right, 2021). MRI Head 2021:  Impression       Postoperative changes of bilateral frontotemporal craniotomy with partial evacuation of the dominant cystic lesions involving the right temporal, right parietal, and left frontal lobes. The other lesions are unchanged. There is stable associated    vasogenic edema.       Improved, minimal residual right-to-left midline shift.       Expected postoperative changes of pneumocephalus, minimal hemorrhagic change in the surgical cavity, and trace bilateral cerebral convexity subdural collections. Discharge Summary per MD    Jade Howard is a 46year old male with no significant past medical history who presents with a 1 month history of worsening aphasia and headache. Patient endorses that he has difficulty finding the right words, but \"knows\" what he wants to say. He also endorses worsening headaches and vision changes. Patient describes the vision changes as sometimes blurry, and sometimes partial vision loss. Patient reports that his symptoms wax and wanes; he reports taking ibuprofen for the headache, but endorses that it does not help. Last night the patient was dropping items at dinner which prompted his father to take him to the [facility] ED. As per father - Patient has had these symptoms for 1 month, and initially began as mild facial paralysis primarily in the upper lip, and fluctuating vision problems.  Concerned with the cost of medical care he neglected Vaginal Yeast Infection: Care Instructions  Your Care Instructions  A vaginal yeast infection is caused by too many yeast cells in the vagina. This is common in women of all ages. Itching, vaginal discharge and irritation, and other symptoms can bother you. But yeast infections don't often cause other health problems. Some medicines can increase your risk of getting a yeast infection. These include antibiotics, birth control pills, hormones, and steroids. You may also be more likely to get a yeast infection if you are pregnant, have diabetes, douche, or wear tight clothes. With treatment, most yeast infections get better in 2 to 3 days. Follow-up care is a key part of your treatment and safety. Be sure to make and go to all appointments, and call your doctor if you are having problems. It's also a good idea to know your test results and keep a list of the medicines you take. How can you care for yourself at home? · Take your medicines exactly as prescribed. Call your doctor if you think you are having a problem with your medicine. · Ask your doctor about over-the-counter (OTC) medicines for yeast infections. They may cost less than prescription medicines. If you use an OTC treatment, read and follow all instructions on the label. · Do not use tampons while using a vaginal cream or suppository. The tampons can absorb the medicine. Use pads instead. · Wear loose cotton clothing. Do not wear nylon or other fabric that holds body heat and moisture close to the skin. · Try sleeping without underwear. · Do not scratch. Relieve itching with a cold pack or a cool bath. · Do not wash your vaginal area more than once a day. Use plain water or a mild, unscented soap. Air-dry the vaginal area. · Change out of wet swimsuits after swimming. · Do not have sex until you have finished your treatment. · Do not douche. When should you call for help?   Call your doctor now or seek immediate medical care if:  · You have unexpected vaginal bleeding. · You have new or increased pain in your vagina or pelvis. Watch closely for changes in your health, and be sure to contact your doctor if:  · You have a fever. · You are not getting better after 2 days. · Your symptoms come back after you finish your medicines. Where can you learn more? Go to http://charity-antoni.info/. Enter O078 in the search box to learn more about \"Vaginal Yeast Infection: Care Instructions. \"  Current as of: February 25, 2016  Content Version: 11.1  © 4924-3347 [employer]. Care instructions adapted under license by [employer] (which disclaims liability or warranty for this information). If you have questions about a medical condition or this instruction, always ask your healthcare professional. [address] any warranty or liability for your use of this information. quadrant (likely secondary to injury to cartwright's loop in the right temporal lobe). Patient had improved muscle strength in the upper and lower extremities. Patient continues to have difficulty \"finding\" the right words, likely secondary to damage to broca's area or arcuate fasciculus. Primary Complaint: pt wants to be able to [facility] better\" and agrees he would like to be able to go back to work. Pt complaint is that he has difficulty getting word out. Onset Date: 08/16/21    Pain:  denies    Assessment:     Diagnosis: Pt presents with signs of mild aphasia and mild apraxia of speech. Pt's father reports cognition is WNL, however testing will be planned to confirm. Pt appears to have significant pragmatice issues with poor eye contact, initiation of speech and flat affect. Pt father reports this is a premorbid behavior for the pt and is how pt would have behaved with a new person prior to recent medical events. Pt denies dysphagia and his father confirms this:  SLP will follow up in future session to confirm. Subjective:   Previous level of function and limitations: see below  General  Chart Reviewed: Yes  Family / Caregiver Present: Yes  Visit Information  Onset Date: 08/16/21  SLP Insurance Information: [facility] Medicaid  Total # of Visits Approved: 30  Total # of Visits to Date: 1  Subjective  Subjective: Pt states goals as \"to be able to talk\" and agrees to wanting to get back to work.   Vital Signs  Patient Currently in Pain: No  Social/Functional History  Lives With: Family (with father)  Active : No  Patient's  Info: pt c/o visual deficit prior to surgery  Education: High school [employer]; [employer]  Occupation: Full time employment  Type of occupation: call center; [employer]  Leisure & Hobbies: Tv; video games    Vision  Vision: Impaired  Vision Exceptions:  (light bothers pt per his report; father describes possible difficulty focusing)  Hearing  Hearing: Within functional limits Objective:     Oral/Motor  Oral Motor: Exceptions to [facility]  Labial ROM: Reduced right  Labial Symmetry: Abnormal symmetry right  Labial Strength: Reduced  Lingual ROM: Reduced right;Reduced left  Lingual Strength: Reduced    Auditory Comprehension  Comprehension: Exceptions  Conversation:  (within normal limits for conversation and directions within testing.)  Other (Comment):  (WNL for longest BDAE paragraph)    Reading Comprehension  Reading Status: Exceptions to [facility]  Paragraph Impairment Severity:  (WNL for short paragraphs)    Expression  Primary Mode of Expression: Verbal  Verbal Expression  Verbal Expression: Exceptions to functional limits  Repetition: Moderate  Automatic Speech: To be assessed in therapy  Confrontation: Severe  Convergent: To be assessed in therapy  Divergent: To be assessed in therapy  Responsive:  To be assessed in therapy  Conversation: Moderate  Interfering Components: Paraphasia  Effective Techniques: Oral motor techniques (visual cueing)    Written Expression  Dominant Hand: Right  Written Expression: [facility] to [facility]  Legibility Impairment Severity:  (WNL)  Self formulation Impairment Severity:  (WNL for sentence level)    Motor Speech  Motor Speech: Exceptions to [facility]  Apraxia: Mild (Unable to be definitively confirmed; follow up planned.)  Able to repeat 3 syllable word 3x:  2/2  Diadochokinetics:  Errors with alternating    Pragmatics/Social Functioning  Pragmatics: Exceptions to [facility]  Affect: Moderate  Eye Contact: Severe  Turn Taking: Mild       Cognition  Orientation  Overall Orientation Status:  (DANTE)  Attention  Attention: Unable to assess  Memory  Memory: Unable to assess  Problem Solving  Problem Solving: Unable to assess (within other task with limited input)  Numeric Reasoning  Numeric Reasoning: Unable to assess  Abstract Reasoning  Abstract Reasoning: Unable to assess  Safety/Judgement  Safety/Judgement: Unable to assess    Additional Assessments:  Portions of the Harinder Diagnostic Aphasia Exam (short form) are administered  · Name, address, phone,  and Age accurate response with no WF difficulty or apraxia  · Paragraph readin/2 with immediate response  · Picture description:  Paraphasias and/or apraxic errors. Syntax and semantics WNL. · Writing:  Name, address and 2 sentence picture description with no errors. Latonia Naming Test:  · Numbers 26-35: 1/10 accurate with no phonemic paraphasia or need for cueing. Written cue is not beneficial, repetition with visual cue is beneficial.    Plan: 2x/week for 5 weeks    Goals:   Short-term Goals  Timeframe for Short-term Goals: 5 weeks  Goal 1: The pt will complete oral motor exercises to improve strength and agility - 80% independently  Goal 2: The pt will read functional vocabulary provided by pt or 3 syllable words - 80% independently. Goal 3: The pt will complete word fluency task iwht 10 items per category - 80% written or verbal  Goal 4: The pt will read and follow instructions - 90% accurately independently  Goal 5: The pt will participate in formal cognitive assessment  Goal 6: Additional goals to be developed as indicated. Speech Therapy Prognosis  Prognosis: Excellent  Prognosis Considerations: Age  Duration/Frequency of Treatment  Duration/Frequency of Treatment: 2x/week for 5 weeks  Recommendations  Requires SLP Intervention: Yes  D/C Recommendations: Home with intermittent assistance  Patient Education: Review of plan and POC; handouts on Aphasia and AOS  Patient Education Response: Verbalizes understanding, Needs reinforcement  D/C Recommendations: Home with intermittent assistance  Requires SLP Intervention: Yes  Patient/family involved in developing goals and treatment plan: yes           Follow Up:   Follow up in: next appt is 2021 at 10:30      Julien Reese 89 CCC/SLP [id number]  Speech Language Pathologist

## 2021-08-16 NOTE — PROGRESS NOTES
Outpatient Speech Therapy  [] [facility] [address]   Phone: [phone]   Fax: [phone]   [x] [facility]  Phone: [phone]              Fax: [phone]  [] [facility]   Phone: [phone]   Fax: [phone]     To: AUDREY Elkins     Patient: Maci Prakash  : 1980  MRN: [id number]  Evaluation Date: 2021      Diagnosis Information:  Visit Diagnoses        Codes     Aphasia    -  Primary R47.01     Apraxia of speech     R48.2       Speech Therapy Certification/Re-Certification Form  Dear AUDREY Elkins  The following patient has been evaluated for speech therapy services and for therapy to continue, Medicare requires monthly physician review of the treatment plan. Please review the attached evaluation and/or summary of the patient's plan of care, and verify that you agree therapy should continue by signing the attached document and sending it back to our office. Plan of Care/Treatment to date:  [x] Speech-Language Evaluation/Treatment    [x] Dysphagia Evaluation/Treatment        [] Dysphagia Treatment via Neuromuscular Electrical Stimulation (NMES)   [] Modified Barium Swallowing Study   [x] Cognitive-Linguistic Skills Development  [] Voice evaluation and Treatment      [] Evaluation, modification, and Training of Voice Prosthetic     [] Evaluation for Speech-Generating Augmentative and Alternative Communication Device   [] Therapeutic Services for the use of Speech-Generating Device. [] Other:          Frequency/Duration:  # Days per week: [] 1 day # Weeks: [] 1 week [x] 5 weeks      [x] 2 days? [] 2 weeks [] 6 weeks     [] 3 days   [] 3 weeks [] 7 weeks     [] 4 days   [] 4 weeks [] 8 weeks    Rehab Potential: [x] Excellent [] Good [] Fair  [] Poor       Electronically signed by:    Julien Trievdi CCC/SLP [id number]  Speech Language Pathologist  21  4:01 PM    If you have any questions or concerns, please don't hesitate to call.   Thank you for your referral.      Physician Signature:________________________________Date:__________________  By signing above, therapists plan is approved by physician

## 2021-08-18 ENCOUNTER — HOSPITAL ENCOUNTER (OUTPATIENT)
Dept: SPEECH THERAPY | Age: 41
Setting detail: THERAPIES SERIES
Discharge: HOME | End: 2021-08-18
Payer: COMMERCIAL

## 2021-08-18 PROCEDURE — 92507 TX SP LANG VOICE COMM INDIV: CPT

## 2021-08-18 NOTE — PROGRESS NOTES
Speech-Language Pathology  Daily Treatment Note    Date:  2021    Patient Name:  Farnaz Alcala    :  1980  MRN: [id number]  Restrictions/Precautions:  Aphasia;apraxia  Diagnosis:  S/p craniotomy   Treatment Diagnosis:     Codes      Aphasia    -  Primary R47.01     Apraxia of speech          Insurance/Certification information:  [employer]  Referring Physician: AUDREY Hernandez  Plan of care signed (Y/N):  N (transmission error reported; resending)  Visit# / total visits:  2  Pain level: /10     Progress Note: []  Yes  []  No  Next due by: Visit #10 (start date; 2021)    Subjective:    2021:  Pt late to tx due to parking; additional outpt parking shown to pt and father. Objective:     Goal 1: The pt will complete oral motor exercises to improve strength and agility - 80% independently  2021:  Reviewed and given for home practice. Goal 2: The pt will read functional vocabulary provided by pt or 3 syllable words - 80% independently. 2021:  Pt did not bring HW folder and did not do HW per his report. Trials with 3 and 2 syllable word. Pt approx 80% with 1-syllable. Given for [facility]. Goal 3: The pt will complete word fluency task witht 10 items per category - 80% written or verbal  2021:  2/3 attempts. Pt reports this is harder than it should be. Given for [facility]. Goal 4: The pt will read and follow instructions - 90% accurately independently  2021:  Not addressed this date. Goal 5: The pt will participate in formal cognitive assessment  2021:  Not addressed this date. Goal 6: Additional goals to be developed as indicated. 2021:  Not addressed this date. Assessment:   Diagnosis: Pt presents with signs of mild aphasia and mild apraxia of speech. Pt's father reports cognition is WNL, however testing will be planned to confirm.   Pt appears to have significant pragmatice issues with poor eye contact, initiation of speech and flat affect. Pt father reports this is a premorbid behavior for the pt and is how pt would have behaved with a new person prior to recent medical events. Pt denies dysphagia and his father confirms this:  SLP will follow up in future session to confirm.     Plan: 2x/week for 5 weeks    Timed Code Treatment:  5    Total Treatment Time: 40    Signature:    Errol Gibson MS CCC/SLP [id number]  Speech Language Pathologist  8/18/2021  3:01 PM

## 2021-08-19 LAB
ANAEROBIC CULTURE: ABNORMAL
CULTURE SURGICAL: ABNORMAL
GRAM STAIN RESULT: ABNORMAL
ORGANISM: ABNORMAL

## 2021-08-23 ENCOUNTER — HOSPITAL ENCOUNTER (OUTPATIENT)
Dept: SPEECH THERAPY | Age: 41
Setting detail: THERAPIES SERIES
Discharge: HOME | End: 2021-08-23
Payer: COMMERCIAL

## 2021-08-23 LAB
ANAEROBIC CULTURE: NORMAL
GRAM STAIN RESULT: NORMAL
WOUND/ABSCESS: NORMAL

## 2021-08-23 PROCEDURE — 97129 THER IVNTJ 1ST 15 MIN: CPT

## 2021-08-23 PROCEDURE — 92507 TX SP LANG VOICE COMM INDIV: CPT

## 2021-08-23 NOTE — PROGRESS NOTES
Speech-Language Pathology  Daily Treatment Note    Date:  2021    Patient Name:  Jade Howard    :  1980  MRN: [id number]  Restrictions/Precautions:  Aphasia;apraxia  Diagnosis:  S/p craniotomy   Treatment Diagnosis:     Codes      Aphasia    -  Primary R47.01     Apraxia of speech          Insurance/Certification information:  [employer]  Referring Physician: NITIN Hobson-NP  Plan of care signed (Y/N):  N (transmission error reported; resending)  Visit# / total visits:  2  Pain level: 1 scar is hurting \"a little\"    Progress Note: []  Yes  []  No  Next due by: Visit #10 (start date; 2021)    Subjective:    2021:  Pt late to tx due to parking; additional outpt parking shown to pt and father. 2021;  Pt on time with [facility] folder. D/W pt regarding scheduling conflict next session. Will work around. Pt reports impaired vision started in 2021 and he feels it is related to cysts. Was supposed to see a neuroopthamologist but ins did not cover. Encouraged to look for another. Objective:     Goal 1: The pt will complete oral motor exercises to improve strength and agility - 80% independently  2021:  Reviewed and given for home practice. 2021:  Min cues. Continue. Goal 2: The pt will read functional vocabulary provided by pt or 3 syllable words - 80% independently. 2021:  Pt did not bring HW folder and did not do HW per his report. Trials with 3 and 2 syllable word. Pt approx 80% with 1-syllable. Given for [facility].  2021;  30% independently with multiple attempts to self correct. 0/10 on 1st attempt. Sentence and paragraph reading given. Goal 3: The pt will complete word fluency task witht 10 items per category - 80% written or verbal  2021:  2/3 attempts. Pt reports this is harder than it should be. Given for [facility].  2021: Additional given with instructions.     Goal 4: The pt will read and follow instructions - 90% accurately independently  8/18/2021:  Not addressed this date. 8/23/2021;  Not addressed this date. Goal 5: The pt will participate in formal cognitive assessment  8/18/2021:  Not addressed this date. 8/23/2021:  Trailmaking A:  53 seconds. B:  2 minutes. Excess time due to pt got stuck on 1 target. Reading comprehension ex given for [facility] with pt to record start and end times. Goal 6: Additional goals to be developed as indicated. 8/18/2021:  Not addressed this date. Education:  8/23/2021:  Review of apraxia handout. Review of strategies:  Slow down, start again and new strategy practiced this date for think of the first letter which appears to benefit pt. Assessment:   Diagnosis: Pt presents with signs of mild aphasia and mild apraxia of speech. Pt's father reports cognition is WNL, however testing will be planned to confirm. Pt appears to have significant pragmatice issues with poor eye contact, initiation of speech and flat affect. Pt father reports this is a premorbid behavior for the pt and is how pt would have behaved with a new person prior to recent medical events. Pt denies dysphagia and his father confirms this:  SLP will follow up in future session to confirm.     Plan: 2x/week for 5 weeks    Cognitive assessment with focus on speed of processing    Timed Code Treatment:  10    Total Treatment Time: 45    Signature:    Brooks Wood MS CCC/SLP [id number]  Speech Language Pathologist  8/18/2021  11:23 AM

## 2021-08-24 ENCOUNTER — HOSPITAL ENCOUNTER (OUTPATIENT)
Dept: MRI IMAGING | Age: 41
Discharge: HOME | End: 2021-08-24
Payer: COMMERCIAL

## 2021-08-24 DIAGNOSIS — D49.6 NEOPLASM, BRAIN (HCC): ICD-10-CM

## 2021-08-24 PROCEDURE — 70553 MRI BRAIN STEM W/O & W/DYE: CPT

## 2021-08-24 PROCEDURE — A9577 INJ MULTIHANCE: HCPCS | Performed by: NEUROLOGICAL SURGERY

## 2021-08-24 PROCEDURE — 6360000004 HC RX CONTRAST MEDICATION: Performed by: NEUROLOGICAL SURGERY

## 2021-08-24 RX ADMIN — GADOBENATE DIMEGLUMINE 17 ML: 529 INJECTION, SOLUTION INTRAVENOUS at 13:59

## 2021-08-25 ENCOUNTER — HOSPITAL ENCOUNTER (OUTPATIENT)
Dept: SPEECH THERAPY | Age: 41
Setting detail: THERAPIES SERIES
Discharge: HOME | End: 2021-08-25
Payer: COMMERCIAL

## 2021-08-25 PROCEDURE — 92507 TX SP LANG VOICE COMM INDIV: CPT

## 2021-08-25 PROCEDURE — 97129 THER IVNTJ 1ST 15 MIN: CPT

## 2021-08-25 NOTE — PROGRESS NOTES
Speech-Language Pathology  Daily Treatment Note    Date:  2021    Patient Name:  Toby Navarro    :  1980  MRN: [id number]  Restrictions/Precautions:  Aphasia;apraxia  Diagnosis:  S/p craniotomy   Treatment Diagnosis:     Codes      Aphasia    -  Primary R47.01     Apraxia of speech          Insurance/Certification information:  [employer]  Referring Physician: AUDREY Castillo  Plan of care signed (Y/N):  N (transmission error reported; resending)  Visit# / total visits:  3  Pain level: 1 scar is hurting \"a little\": pt advised to notify Md at appt on 2021           Teeth hurting sometimes: At night an 8, currently a 1    Progress Note: []  Yes  []  No  Next due by: Visit #10 (start date; 2021)    Subjective:    2021:  Pt late to tx due to parking; additional outpt parking shown to pt and father. 2021;  Pt on time with [facility] folder. D/W pt regarding scheduling conflict next session. Will work around. Pt reports impaired vision started in 2021 and he feels it is related to cysts. Was supposed to see a neuroopthamologist but ins did not cover. Encouraged to look for another. 2021:  Repeat MRI completed 2021. Pt sees surgeon on 2021. Speech in conversation is improved this date. Objective:     Goal 1: The pt will complete oral motor exercises to improve strength and agility - 80% independently  2021:  Reviewed and given for home practice. 2021:  Min cues. Continue. 2021:  Pt feels he can do these independently. Not addressed this date directly. Goal 2: The pt will read functional vocabulary provided by pt or 3 syllable words - 80% independently. 2021:  Pt did not bring HW folder and did not do HW per his report. Trials with 3 and 2 syllable word. Pt approx 80% with 1-syllable. Given for [facility].  2021;  30% independently with multiple attempts to self correct. 0/10 on 1st attempt. answered. Assessment:   Diagnosis: Pt presents with signs of mild aphasia and mild apraxia of speech. Pt's father reports cognition is WNL, however testing will be planned to confirm. Pt appears to have significant pragmatice issues with poor eye contact, initiation of speech and flat affect. Pt father reports this is a premorbid behavior for the pt and is how pt would have behaved with a new person prior to recent medical events. Pt denies dysphagia and his father confirms this:  SLP will follow up in future session to confirm.     Plan: 2x/week for 5 weeks    Cognitive assessment with focus on speed of processing    Timed Code Treatment:  15    Total Treatment Time: 45    Signature:    Jhoan Vasquez MS CCC/SLP [id number]  Speech Language Pathologist  8/18/2021  11:31 AM

## 2021-08-26 ENCOUNTER — HOSPITAL ENCOUNTER (OUTPATIENT)
Age: 41
Setting detail: SPECIMEN
Discharge: HOME | End: 2021-08-26
Payer: COMMERCIAL

## 2021-08-30 ENCOUNTER — HOSPITAL ENCOUNTER (OUTPATIENT)
Dept: SPEECH THERAPY | Age: 41
Setting detail: THERAPIES SERIES
Discharge: HOME | End: 2021-08-30
Payer: COMMERCIAL

## 2021-08-30 LAB
FUNGUS (MYCOLOGY) CULTURE: NORMAL
FUNGUS (MYCOLOGY) CULTURE: NORMAL
FUNGUS STAIN: NORMAL
FUNGUS STAIN: NORMAL

## 2021-08-30 PROCEDURE — 97129 THER IVNTJ 1ST 15 MIN: CPT

## 2021-08-30 PROCEDURE — 92507 TX SP LANG VOICE COMM INDIV: CPT

## 2021-08-30 NOTE — PROGRESS NOTES
Speech-Language Pathology  Daily Treatment Note    Date:  2021    Patient Name:  Megan Puckett    :  1980  MRN: [id number]  Restrictions/Precautions:  Aphasia;apraxia  Diagnosis:  S/p craniotomy   Treatment Diagnosis:     Codes      Aphasia    -  Primary R47.01     Apraxia of speech          Insurance/Certification information:  [employer]  Referring Physician: AUDREY Ludwig  Plan of care signed (Y/N):  N (transmission error reported; resending)  Visit# / total visits:  4  Pain level: 1 scar is hurting \"a little\": pt advised Md this date and reports it should loosen up in time. Teeth hurting sometimes: At night an 8, currently a 1    Progress Note: []  Yes  []  No  Next due by: Visit #10 (start date; 2021)    Subjective:    2021:  Pt late to tx due to parking; additional outpt parking shown to pt and father. 2021;  Pt on time with [facility] folder. D/W pt regarding scheduling conflict next session. Will work around. Pt reports impaired vision started in 2021 and he feels it is related to cysts. Was supposed to see a neuroopthamologist but ins did not cover. Encouraged to look for another. 2021:  Repeat MRI completed 2021. Pt sees surgeon on 2021. Speech in conversation is improved this date. 2021:  Review of pt med hx:  Cysts were drained as able and others appear to be shrinking on their own per MD.  Md is not exactly sure of what is causing this and [place] dx per pt. Objective:     Goal 1: The pt will complete oral motor exercises to improve strength and agility - 80% independently  2021:  Reviewed and given for home practice. 2021:  Min cues. Continue. 2021:  Pt feels he can do these independently. Not addressed this date directly.     Goal 2: The pt will read functional vocabulary provided by pt or 3 syllable words - 80% independently. 8/18/2021:  Pt did not bring HW folder and did not do HW per his report. Trials with 3 and 2 syllable word. Pt approx 80% with 1-syllable. Given for [facility].  8/23/2021;  30% independently with multiple attempts to self correct. 0/10 on 1st attempt. Sentence and paragraph reading given. 8/25/2021:  Pt with slow rate and errors with counting with various unpredictable numbers. Slow with LUIS. Given as HW warm ups along with singing simple songs. 8/30/2021:  Counting: upgraded to backward. LUIS: increased speed. Practice with 2 syllable, same initial phoneme phrases. Additional given plus basic tongue twisters. Attempts with singing: pt is not comfortable with this in therapy. Written, verbal and demonstration of strategy for trying to get a rhythm with speech: pt is low energy and monotone typically. Rhythm may improve productions. Pt is to bring in 2 songs he likes. Goal 3: The pt will complete word fluency task witht 10 items per category - 80% written or verbal  8/18/2021:  2/3 attempts. Pt reports this is harder than it should be. Given for [facility].  8/23/2021: Additional given with instructions. 8/25/2021:  Goal met for recall of the words. Apraxia and/or paraphasias limits verbal output rate. Will f/u this goal x1.  8/30/2021:  Not addressed this date. Goal 4: The pt will read and follow instructions - 90% accurately independently  8/18/2021:  Not addressed this date. 8/23/2021;  Not addressed this date. 8/25/2021: In therapy, reading and following directions (informal assessment tool):  80% with self correction x1. D/w pt. Will monitor but not overly concerned due to unpredictable nature of this task. Pt in agreement. 8/30/2021:  Goal held. Goal 5: The pt will participate in formal cognitive assessment  8/18/2021:  Not addressed this date. 8/23/2021:  Trailmaking A:  53 seconds. B:  2 minutes. Excess time due to pt got stuck on 1 target.   Reading comprehension ex given for [facility] with pt to record start and end times. 8/25/2021:  Not addressed this date. 8/30/2021:  Pt has not been doing \"much\" HW. Calendar given to assist with initiation and motivation. Goal 6: Additional goals to be developed as indicated. 8/18/2021:  Not addressed this date. 8/23/2021:  Hw Reading comprehension for short magazine article: Accurate WNL. In therapy, reading and following directions (informal assessment tool):  80% with self correction x1    Education:  8/23/2021:  Review of apraxia handout. Review of strategies:  Slow down, start again and new strategy practiced this date for think of the first letter which appears to benefit pt.    8/25/2021:  Strategies and tips/tricks typed up, reviewed and given to pt. Pt able to state 3 independently. Handout on neuroplasticity given and reviewed with pt questions answered. Assessment:   Diagnosis: Pt presents with signs of mild aphasia and mild apraxia of speech. Pt's father reports cognition is WNL, however testing will be planned to confirm. Pt appears to have significant pragmatice issues with poor eye contact, initiation of speech and flat affect. Pt father reports this is a premorbid behavior for the pt and is how pt would have behaved with a new person prior to recent medical events. Pt denies dysphagia and his father confirms this:  SLP will follow up in future session to confirm. Plan: 2x/week for 5 weeks    Cognitive assessment with focus on speed of processing  Pt to bring in 2 songs.     Timed Code Treatment:  15    Total Treatment Time: 45    Signature:    Maegan Zabala MS CCC/SLP [id number]  Speech Language Pathologist  8/18/2021  12:12 PM

## 2021-09-01 ENCOUNTER — HOSPITAL ENCOUNTER (OUTPATIENT)
Dept: SPEECH THERAPY | Age: 41
Setting detail: THERAPIES SERIES
Discharge: HOME | End: 2021-09-01
Payer: COMMERCIAL

## 2021-09-01 PROCEDURE — 92507 TX SP LANG VOICE COMM INDIV: CPT

## 2021-09-01 PROCEDURE — 97129 THER IVNTJ 1ST 15 MIN: CPT

## 2021-09-01 NOTE — PROGRESS NOTES
Speech-Language Pathology  Daily Treatment Note    Date:  2021    Patient Name:  Kaylyn Tracy    :  1980  MRN: [id number]  Restrictions/Precautions:  Aphasia;apraxia  Diagnosis:  S/p craniotomy   Treatment Diagnosis:     Codes      Aphasia    -  Primary R47.01     Apraxia of speech          Insurance/Certification information:  [employer]  Referring Physician: AUDREY Morfin  Plan of care signed (Y/N):  N (transmission error reported; resending)  Visit# / total visits:  5  Pain level: 1 scar is hurting \"a little\": pt advised Md this date and reports it should loosen up in time. Teeth hurting sometimes: At night an 8, currently a 1    Progress Note: []  Yes  []  No  Next due by: Visit #10 (start date; 2021)    Subjective:    2021:  Pt late to tx due to parking; additional outpt parking shown to pt and father. 2021;  Pt on time with [facility] folder. D/W pt regarding scheduling conflict next session. Will work around. Pt reports impaired vision started in 2021 and he feels it is related to cysts. Was supposed to see a neuroopthamologist but ins did not cover. Encouraged to look for another. 2021:  Repeat MRI completed 2021. Pt sees surgeon on 2021. Speech in conversation is improved this date. 2021:  Review of pt med hx:  Cysts were drained as able and others appear to be shrinking on their own per MD.  Md is not exactly sure of what is causing this and is talking to doctors across the [place] for a definitive dx per pt. 2021:  Pt with limited verbalization in therapy this date. Objective:     Goal 1: The pt will complete oral motor exercises to improve strength and agility - 80% independently  2021:  Reviewed and given for home practice. 2021:  Min cues. Continue. 2021:  Pt feels he can do these independently. Not addressed this date directly.   2021:  Not addressed this date.      Goal 2: The pt will read functional vocabulary provided by pt or 3 syllable words - 80% independently. 8/18/2021:  Pt did not bring HW folder and did not do HW per his report. Trials with 3 and 2 syllable word. Pt approx 80% with 1-syllable. Given for [facility].  8/23/2021;  30% independently with multiple attempts to self correct. 0/10 on 1st attempt. Sentence and paragraph reading given. 8/25/2021:  Pt with slow rate and errors with counting with various unpredictable numbers. Slow with LUIS. Given as HW warm ups along with singing simple songs. 8/30/2021:  Counting: upgraded to backward. LUIS: increased speed. Practice with 2 syllable, same initial phoneme phrases. Additional given plus basic tongue twisters. Attempts with singing: pt is not comfortable with this in therapy. Written, verbal and demonstration of strategy for trying to get a rhythm with speech: pt is low energy and monotone typically. Rhythm may improve productions. Pt is to bring in 2 songs he likes. 9/1/12021:  Tongue twisters reviewed:  Pt can do independently. Improves with repetition. · 3 syllable words/phrases:  100% with multiple repetitions allowed; 60% for 5 sets covering 5 phonemes (p,b,t,m,w). Phonemes are not randomized. · Practice with sentence and paragraph reading with primary goal this date to slow rate to allow improved production. ·  Lyrics to pt's 2 chosen songs printed and given to pt. Goal 3: The pt will complete word fluency task witht 10 items per category - 80% written or verbal  8/18/2021:  2/3 attempts. Pt reports this is harder than it should be. Given for [facility].  8/23/2021: Additional given with instructions. 8/25/2021:  Goal met for recall of the words. Apraxia and/or paraphasias limits verbal output rate. Will f/u this goal x1.  8/30/2021:  Not addressed this date. 9/1/2021;  Not addressed this date.     Goal 4: The pt will read and follow instructions - 90% accurately independently  8/18/2021:  Not addressed this date. 8/23/2021;  Not addressed this date. 8/25/2021: In therapy, reading and following directions (informal assessment tool):  80% with self correction x1. D/w pt. Will monitor but not overly concerned due to unpredictable nature of this task. Pt in agreement. 8/30/2021:  Goal held. Goal 5: The pt will participate in formal cognitive assessment  8/18/2021:  Not addressed this date. 8/23/2021:  Trailmaking A:  53 seconds. B:  2 minutes. Excess time due to pt got stuck on 1 target. Reading comprehension ex given for [facility] with pt to record start and end times. 8/25/2021:  Not addressed this date. 8/30/2021:  Pt has not been doing \"much\" HW. Calendar given to assist with initiation and motivation. 9/1/2021:  Pt given challenge of HW 2x/day for 15-20 minutes each session. Goal 6: Additional goals to be developed as indicated. 8/18/2021:  Not addressed this date. 8/23/2021:   Reading comprehension for short magazine article: Accurate WNL. In therapy, reading and following directions (informal assessment tool):      Education:  8/23/2021:  Review of apraxia handout. Review of strategies:  Slow down, start again and new strategy practiced this date for think of the first letter which appears to benefit pt.    8/25/2021:  Strategies and tips/tricks typed up, reviewed and given to pt. Pt able to state 3 independently. Handout on neuroplasticity given and reviewed with pt questions answered. Assessment:   Diagnosis: Pt presents with signs of mild aphasia and mild apraxia of speech. Pt's father reports cognition is WNL, however testing will be planned to confirm. Pt appears to have significant pragmatice issues with poor eye contact, initiation of speech and flat affect. Pt father reports this is a premorbid behavior for the pt and is how pt would have behaved with a new person prior to recent medical events.   Pt denies dysphagia and his father confirms this:  SLP will follow up in future session to confirm.     Plan: 2x/week for 5 weeks    Cognitive assessment with focus on speed of processing    Timed Code Treatment:  15    Total Treatment Time: 45    Signature:    Julien Reese CCC/SLP [id number]  Speech Language Pathologist  8/18/2021  2:12 PM

## 2021-09-08 ENCOUNTER — HOSPITAL ENCOUNTER (OUTPATIENT)
Dept: SPEECH THERAPY | Age: 41
Setting detail: THERAPIES SERIES
Discharge: HOME | End: 2021-09-08
Payer: COMMERCIAL

## 2021-09-08 PROCEDURE — 92507 TX SP LANG VOICE COMM INDIV: CPT

## 2021-09-08 PROCEDURE — 97129 THER IVNTJ 1ST 15 MIN: CPT

## 2021-09-08 NOTE — PROGRESS NOTES
Speech-Language Pathology  Daily Treatment Note    Date:  2021    Patient Name:  Maren Toure    :  1980  MRN: [id number]  Restrictions/Precautions:  Aphasia;apraxia  Diagnosis:  S/p craniotomy   Treatment Diagnosis:     Codes      Aphasia    -  Primary R47.01     Apraxia of speech          Insurance/Certification information:  [employer]  Referring Physician: AUDREY Laws  Plan of care signed (Y/N):  N (transmission error reported; resending)  Visit# / total visits:  6  Pain level: scar pain is improving. Progress Note: []  Yes  []  No  Next due by: Visit #10 (start date; 2021)    Subjective:    2021:  Pt late to tx due to parking; additional outpt parking shown to pt and father. 2021;  Pt on time with [facility] folder. D/W pt regarding scheduling conflict next session. Will work around. Pt reports impaired vision started in 2021 and he feels it is related to cysts. Was supposed to see a neuroopthamologist but ins did not cover. Encouraged to look for another. 2021:  Repeat MRI completed 2021. Pt sees surgeon on 2021. Speech in conversation is improved this date. 2021:  Review of pt med hx:  Cysts were drained as able and others appear to be shrinking on their own per MD.  Md is not exactly sure of what is causing this and is talking to doctors across the [place] for a definitive dx per pt. 2021:  Pt with limited verbalization in therapy this date. Objective:     Goal 1: The pt will complete oral motor exercises to improve strength and agility - 80% independently. Goal d/c'd  2021:  Reviewed and given for home practice. 2021:  Min cues. Continue. 2021:  Pt feels he can do these independently. Not addressed this date directly. 2021:  Not addressed this date. Goal 2: The pt will read functional vocabulary provided by pt or 3 syllable words - 80% independently.   2021:  Pt did not bring HW folder and did not do HW per his report. Trials with 3 and 2 syllable word. Pt approx 80% with 1-syllable. Given for [facility].  8/23/2021;  30% independently with multiple attempts to self correct. 0/10 on 1st attempt. Sentence and paragraph reading given. 8/25/2021:  Pt with slow rate and errors with counting with various unpredictable numbers. Slow with LUIS. Given as HW warm ups along with singing simple songs. 8/30/2021:  Counting: upgraded to backward. LUIS: increased speed. Practice with 2 syllable, same initial phoneme phrases. Additional given plus basic tongue twisters. Attempts with singing: pt is not comfortable with this in therapy. Written, verbal and demonstration of strategy for trying to get a rhythm with speech: pt is low energy and monotone typically. Rhythm may improve productions. Pt is to bring in 2 songs he likes. 9/1/12021:  Tongue twisters reviewed:  Pt can do independently. Improves with repetition. · 3 syllable words/phrases:  100% with multiple repetitions allowed; 60% for 5 sets covering 5 phonemes (p,b,t,m,w). Phonemes are not randomized. · Practice with sentence and paragraph reading with primary goal this date to slow rate to allow improved production. ·  Lyrics to pt's 2 chosen songs printed and given to pt.    9/8/2021:  3 syllable words: 100% with repetition. After practice, 2 syllable words with 60% accurate on 1st attempt. Functional sentences: 90% accurate with min error x 1. Practice with functional vocab and carrier phrases given. Pt cued for steps in practice and how to make a task easier or more challenging. Pt demonstrates with min cues. Paragraph reading with cues to break sentences into phrases or words to achieve positive practice. Plan to establish scripts; pt unsure where he will want to go to work. Goal 3: The pt will complete word fluency task witht 10 items per category - 80% written or verbal  8/18/2021:  2/3 attempts.   Pt reports this is harder than it should be. Given for [facility].  8/23/2021: Additional given with instructions. 8/25/2021:  Goal met for recall of the words. Apraxia and/or paraphasias limits verbal output rate. Will f/u this goal x1.  8/30/2021:  Not addressed this date. 9/1/2021;  Not addressed this date. 9/8/2021:  Goal met within 60 seconds for word recall. Will f/u with pt to assure he feels word recall is [facility]. Goal 4: The pt will read and follow instructions - 90% accurately independently  8/18/2021:  Not addressed this date. 8/23/2021;  Not addressed this date. 8/25/2021: In therapy, reading and following directions (informal assessment tool):  80% with self correction x1. D/w pt. Will monitor but not overly concerned due to unpredictable nature of this task. Pt in agreement. 8/30/2021:  Goal held. Goal 5: The pt will participate in formal cognitive assessment  8/18/2021:  Not addressed this date. 8/23/2021:  Trailmaking A:  53 seconds. B:  2 minutes. Excess time due to pt got stuck on 1 target. Reading comprehension ex given for [facility] with pt to record start and end times. 8/25/2021:  Not addressed this date. 8/30/2021:  Pt has not been doing \"much\" HW. Calendar given to assist with initiation and motivation. 9/1/2021:  Pt given challenge of HW 2x/day for 15-20 minutes each session. 9/8/2021:  Brain Injury Checklists returned and completed by pt and father. No new difficulties reports other than word finding and \"pronunciation. \"     Goal 6: Additional goals to be developed as indicated. 8/18/2021:  Not addressed this date. 8/23/2021:   Reading comprehension for short magazine article: Accurate WNL. In therapy, reading and following directions (informal assessment tool):      Education:  8/23/2021:  Review of apraxia handout.   Review of strategies:  Slow down, start again and new strategy practiced this date for think of the first letter which appears to benefit pt.    8/25/2021: Strategies and tips/tricks typed up, reviewed and given to pt. Pt able to state 3 independently. Handout on neuroplasticity given and reviewed with pt questions answered. 9/8/2021:  Education on strategies and hierarchies for speech. Pt uses calendar and logs exercises 2x/day. Encouraged to continue. Assessment:   Diagnosis: Pt presents with signs of mild aphasia and mild apraxia of speech. Pt's father reports cognition is WNL, however testing will be planned to confirm. Pt appears to have significant pragmatice issues with poor eye contact, initiation of speech and flat affect. Pt father reports this is a premorbid behavior for the pt and is how pt would have behaved with a new person prior to recent medical events. Pt denies dysphagia and his father confirms this:  SLP will follow up in future session to confirm. Plan: 2x/week for 5 weeks    Cognitive assessment with focus on speed of processing  Continue to monitor for where to pt may want to return to work to assist to directing practice.    D/w pt word recall    Timed Code Treatment:  15    Total Treatment Time: 45    Signature:    Julien Jackson CCC/SLP [id number]  Speech Language Pathologist  8/18/2021  12:25 PM

## 2021-09-13 ENCOUNTER — HOSPITAL ENCOUNTER (OUTPATIENT)
Dept: SPEECH THERAPY | Age: 41
Setting detail: THERAPIES SERIES
Discharge: HOME | End: 2021-09-13
Payer: COMMERCIAL

## 2021-09-13 LAB
FUNGUS (MYCOLOGY) CULTURE: ABNORMAL
FUNGUS STAIN: ABNORMAL
ORGANISM: ABNORMAL

## 2021-09-13 PROCEDURE — 97129 THER IVNTJ 1ST 15 MIN: CPT

## 2021-09-13 PROCEDURE — 87158 CULTURE TYPING ADDED METHOD: CPT

## 2021-09-13 PROCEDURE — 92507 TX SP LANG VOICE COMM INDIV: CPT

## 2021-09-13 PROCEDURE — 87153 DNA/RNA SEQUENCING: CPT

## 2021-09-13 NOTE — PROGRESS NOTES
Speech-Language Pathology  Daily Treatment Note    Date:  2021    Patient Name:  Hunter Rowland    :  1980  MRN: [id number]  Restrictions/Precautions:  Aphasia;apraxia  Diagnosis:  S/p craniotomy   Treatment Diagnosis:     Codes      Aphasia    -  Primary R47.01     Apraxia of speech          Insurance/Certification information:  [employer]  Referring Physician: AUDREY López  Plan of care signed (Y/N):  N (transmission error reported; resending)  Visit# / total visits:  7  Pain level: scar pain is essentially resolved  \" a little tightness\"    Progress Note: []  Yes  []  No  Next due by: Visit #10 (start date; 2021)    Subjective:    2021:  Pt late to tx due to parking; additional outpt parking shown to pt and father. 2021;  Pt on time with [facility] folder. D/W pt regarding scheduling conflict next session. Will work around. Pt reports impaired vision started in 2021 and he feels it is related to cysts. Was supposed to see a neuroopthamologist but ins did not cover. Encouraged to look for another. 2021:  Repeat MRI completed 2021. Pt sees surgeon on 2021. Speech in conversation is improved this date. 2021:  Review of pt med hx:  Cysts were drained as able and others appear to be shrinking on their own per MD.  Md is not exactly sure of what is causing this and [place] definitive dx per pt. 2021:  Pt with limited verbalization in therapy this date. Objective:     Goal 1: The pt will complete oral motor exercises to improve strength and agility - 80% independently. Goal d/c'd  2021:  Reviewed and given for home practice. 2021:  Min cues. Continue. 2021:  Pt feels he can do these independently. Not addressed this date directly. 2021:  Not addressed this date.     Goal 2: The pt will read functional vocabulary provided by pt or 3 syllable words - 80% independently. 8/18/2021:  Pt did not bring HW folder and did not do HW per his report. Trials with 3 and 2 syllable word. Pt approx 80% with 1-syllable. Given for [facility].  8/23/2021;  30% independently with multiple attempts to self correct. 0/10 on 1st attempt. Sentence and paragraph reading given. 8/25/2021:  Pt with slow rate and errors with counting with various unpredictable numbers. Slow with LUIS. Given as HW warm ups along with singing simple songs. 8/30/2021:  Counting: upgraded to backward. LUIS: increased speed. Practice with 2 syllable, same initial phoneme phrases. Additional given plus basic tongue twisters. Attempts with singing: pt is not comfortable with this in therapy. Written, verbal and demonstration of strategy for trying to get a rhythm with speech: pt is low energy and monotone typically. Rhythm may improve productions. Pt is to bring in 2 songs he likes. 9/1/12021:  Tongue twisters reviewed:  Pt can do independently. Improves with repetition. · 3 syllable words/phrases:  100% with multiple repetitions allowed; 60% for 5 sets covering 5 phonemes (p,b,t,m,w). Phonemes are not randomized. · Practice with sentence and paragraph reading with primary goal this date to slow rate to allow improved production. ·  Lyrics to pt's 2 chosen songs printed and given to pt.    9/8/2021:  3 syllable words: 100% with repetition. After practice, 2 syllable words with 60% accurate on 1st attempt. Functional sentences: 90% accurate with min error x 1. Practice with functional vocab and carrier phrases given. Pt cued for steps in practice and how to make a task easier or more challenging. Pt demonstrates with min cues. Paragraph reading with cues to break sentences into phrases or words to achieve positive practice. Plan to establish scripts; pt unsure where he will want to go to work. 9/13/2021:  3 syllable words:  100% with repetition; 70% on 1st attempt.   Practice with functional sentences:  70% on 1st attempt; others with minor errors. Practice with conversational speech; hindered by pt's quiet demeanor, but 100% for short conversational responses. Practice with reading news on line out loud with cues for simplifying target and then builidng. Pt demonstrates ability to use this technique: given this task for [facility]. Goal 3: The pt will complete word fluency task with 10 items per category - 80% written or verbal  2021:  2/3 attempts. Pt reports this is harder than it should be. Given for [facility].  2021: Additional given with instructions. 2021:  Goal met for recall of the words. Apraxia and/or paraphasias limits verbal output rate. Will f/u this goal x1.  2021:  Not addressed this date. 2021;  Not addressed this date. 2021:  Goal met within 60 seconds for word recall. Will f/u with pt to assure he feels word recall is [facility]. 2021: Word fluency: names 10 in 3 functional categories in 40 seconds or less. Deductive namin%. Goal 4: The pt will read and follow instructions - 90% accurately independently  2021:  Not addressed this date. 2021;  Not addressed this date. 2021: In therapy, reading and following directions (informal assessment tool):  80% with self correction x1. D/w pt. Will monitor but not overly concerned due to unpredictable nature of this task. Pt in agreement. 2021:  Goal held. Goal 5: The pt will participate in formal cognitive assessment  2021:  Not addressed this date. 2021:  Trailmaking A:  53 seconds. B:  2 minutes. Excess time due to pt got stuck on 1 target. Reading comprehension ex given for [facility] with pt to record start and end times. 2021:  Not addressed this date. 2021:  Pt has not been doing \"much\" HW. Calendar given to assist with initiation and motivation. 2021:  Pt given challenge of HW 2x/day for 15-20 minutes each session.   2021:  Brain Injury Checklists returned and completed by pt and father. No new difficulties reports other than word finding and \"pronunciation. \"   2021:  Deductive namin%. Goal 6: Additional goals to be developed as indicated. 2021:  Not addressed this date. 2021:  Hw Reading comprehension for short magazine article: Accurate WNL. In therapy, reading and following directions (informal assessment tool):      Education:  2021:  Review of apraxia handout. Review of strategies:  Slow down, start again and new strategy practiced this date for think of the first letter which appears to benefit pt.    2021:  Strategies and tips/tricks typed up, reviewed and given to pt. Pt able to state 3 independently. Handout on neuroplasticity given and reviewed with pt questions answered. 2021:  Education on strategies and hierarchies for speech. Pt uses calendar and logs exercises 2x/day. Encouraged to continue. 2021:  Pt practiceing 2x/day per chart. Assessment:   Diagnosis: Pt presents with signs of mild aphasia and mild apraxia of speech. Pt's father reports cognition is WNL, however testing will be planned to confirm. Pt appears to have significant pragmatice issues with poor eye contact, initiation of speech and flat affect. Pt father reports this is a premorbid behavior for the pt and is how pt would have behaved with a new person prior to recent medical events. Pt denies dysphagia and his father confirms this:  SLP will follow up in future session to confirm. Plan: 2x/week for 5 weeks    Cognitive assessment with focus on speed of processing  Continue to monitor for where to pt may want to return to work to assist to directing practice.        Timed Code Treatment:  15    Total Treatment Time: 39    Signature:    Armand Bond, MS CCC/SLP [id number]  Speech Language Pathologist  2021  11:16 AM

## 2021-09-14 LAB
AFB CULTURE (MYCOBACTERIA): NORMAL
AFB CULTURE (MYCOBACTERIA): NORMAL
AFB SMEAR: NORMAL
AFB SMEAR: NORMAL
Lab: NORMAL
REPORT: NORMAL
THIS TEST SENT TO: NORMAL

## 2021-09-15 ENCOUNTER — HOSPITAL ENCOUNTER (OUTPATIENT)
Dept: SPEECH THERAPY | Age: 41
Setting detail: THERAPIES SERIES
Discharge: HOME | End: 2021-09-15
Payer: COMMERCIAL

## 2021-09-15 PROCEDURE — 97129 THER IVNTJ 1ST 15 MIN: CPT

## 2021-09-15 PROCEDURE — 92507 TX SP LANG VOICE COMM INDIV: CPT

## 2021-09-15 NOTE — PROGRESS NOTES
Speech-Language Pathology  Daily Treatment Note    Date:  9/15/2021    Patient Name:  Hunter Rowland    :  1980  MRN: [id number]  Restrictions/Precautions:  Aphasia;apraxia  Diagnosis:  S/p craniotomy   Treatment Diagnosis:     Codes      Aphasia    -  Primary R47.01     Apraxia of speech          Insurance/Certification information:  [employer]  Referring Physician: AUDREY López  Plan of care signed (Y/N):  N (transmission error reported; resending)  Visit# / total visits:  8  Pain level: scar pain is essentially resolved  \" a little tightness\"    Progress Note: []  Yes  []  No  Next due by: Visit #10 (start date; 2021)    Subjective:    2021:  Pt late to tx due to parking; additional outpt parking shown to pt and father. 2021;  Pt on time with [facility] folder. D/W pt regarding scheduling conflict next session. Will work around. Pt reports impaired vision started in 2021 and he feels it is related to cysts. Was supposed to see a neuroopthamologist but ins did not cover. Encouraged to look for another. 2021:  Repeat MRI completed 2021. Pt sees surgeon on 2021. Speech in conversation is improved this date. 2021:  Review of pt med hx:  Cysts were drained as able and others appear to be shrinking on their own per MD.  Md is not exactly sure of what is causing this and is talking to doctors across the country for a definitive dx per pt. 2021:  Pt with limited verbalization in therapy this date. Objective:     Goal 1: The pt will complete oral motor exercises to improve strength and agility - 80% independently. Goal d/c'd  2021:  Reviewed and given for home practice. 2021:  Min cues. Continue. 2021:  Pt feels he can do these independently. Not addressed this date directly. 2021:  Not addressed this date.     Goal 2: The pt will read functional vocabulary provided by pt or 3 syllable words - 80% sentences:  70% on 1st attempt; others with minor errors. Practice with conversational speech; hindered by pt's quiet demeanor, but 100% for short conversational responses. Practice with reading news on line out loud with cues for simplifying target and then builidng. Pt demonstrates ability to use this technique: given this task for [facility].    9/15/2021:  40% for 3 -syllable words on 1st attempt. Practice with reading news articles on line. Pt improves with cues for very slow rate. Education to work at a slow level and then we will work up to increased rate. Multi-syllabic words given to practice for apraxia and to practice the concept of viewing complex words as smaller parts (\"words\"). Trial with intial blends due to possible increased difficulty within reading task: no noted increased difficulty noted in isolation. Will continue to address in longer targets. Goal 3: The pt will complete word fluency task with 10 items per category - 80% written or verbal  2021:  2/3 attempts. Pt reports this is harder than it should be. Given for [facility].  2021: Additional given with instructions. 2021:  Goal met for recall of the words. Apraxia and/or paraphasias limits verbal output rate. Will f/u this goal x1.  2021:  Not addressed this date. 2021;  Not addressed this date. 2021:  Goal met within 60 seconds for word recall. Will f/u with pt to assure he feels word recall is [facility]. 2021: Word fluency: names 10 in 3 functional categories in 40 seconds or less. Deductive namin%. Goal 4: The pt will read and follow instructions - 90% accurately independently  2021:  Not addressed this date. 2021;  Not addressed this date. 2021: In therapy, reading and following directions (informal assessment tool):  80% with self correction x1. D/w pt. Will monitor but not overly concerned due to unpredictable nature of this task. Pt in agreement.   2021:  Goal held.    Goal 5: The pt will participate in formal cognitive assessment  2021:  Not addressed this date. 2021:  Trailmaking A:  53 seconds. B:  2 minutes. Excess time due to pt got stuck on 1 target. Reading comprehension ex given for [facility] with pt to record start and end times. 2021:  Not addressed this date. 2021:  Pt has not been doing \"much\" HW. Calendar given to assist with initiation and motivation. 2021:  Pt given challenge of HW 2x/day for 15-20 minutes each session. 2021:  Brain Injury Checklists returned and completed by pt and father. No new difficulties reports other than word finding and \"pronunciation. \"   2021:  Deductive namin%. 9/15/2021:  Not addressed this date. Goal 6: Additional goals to be developed as indicated. 2021:  Not addressed this date. 2021:   Reading comprehension for short magazine article: Accurate WNL. In therapy, reading and following directions (informal assessment tool):      Education:  2021:  Review of apraxia handout. Review of strategies:  Slow down, start again and new strategy practiced this date for think of the first letter which appears to benefit pt.    2021:  Strategies and tips/tricks typed up, reviewed and given to pt. Pt able to state 3 independently. Handout on neuroplasticity given and reviewed with pt questions answered. 2021:  Education on strategies and hierarchies for speech. Pt uses calendar and logs exercises 2x/day. Encouraged to continue. 2021:  Pt practiceing 2x/day per chart. 9/15/2021:  Slow rate, breaking down long words into syllables. Pt practicing making decisions with home program for how to decrease/increase complexity depending on success with task. Assessment:   Diagnosis: Pt presents with signs of mild aphasia and mild apraxia of speech. Pt's father reports cognition is WNL, however testing will be planned to confirm.   Pt appears to have significant pragmatice issues with poor eye contact, initiation of speech and flat affect. Pt father reports this is a premorbid behavior for the pt and is how pt would have behaved with a new person prior to recent medical events. Pt denies dysphagia and his father confirms this:  SLP will follow up in future session to confirm. Plan: 2x/week for 5 weeks    Cognitive assessment with focus on speed of processing  Continue to monitor for where to pt may want to return to work to assist to directing practice.        Timed Code Treatment:  15    Total Treatment Time: 39    Signature:    Mk Irwin MS CCC/SLP [id number]  Speech Language Pathologist  8/18/2021  9:24 AM

## 2021-09-20 ENCOUNTER — HOSPITAL ENCOUNTER (OUTPATIENT)
Dept: SPEECH THERAPY | Age: 41
Setting detail: THERAPIES SERIES
Discharge: HOME | End: 2021-09-20
Payer: COMMERCIAL

## 2021-09-20 PROCEDURE — 92507 TX SP LANG VOICE COMM INDIV: CPT

## 2021-09-20 PROCEDURE — 97129 THER IVNTJ 1ST 15 MIN: CPT

## 2021-09-20 NOTE — PROGRESS NOTES
Speech-Language Pathology  Daily Treatment Note    Date:  2021    Patient Name:  Megan Puckett    :  1980  MRN: [id number]  Restrictions/Precautions:  Aphasia;apraxia  Diagnosis:  S/p craniotomy   Treatment Diagnosis:     Codes      Aphasia    -  Primary R47.01     Apraxia of speech          Insurance/Certification information:  [employer]  Referring Physician: AUDREY Ludwig  Plan of care signed (Y/N):  N (transmission error reported; resending)  Visit# / total visits:  9  Pain level: scar pain is essentially resolved; no pain reported    Progress Note: []  Yes  []  No  Next due by: Visit #10 (start date; 2021)    Subjective:    2021:  Pt late to tx due to parking; additional outpt parking shown to pt and father. 2021;  Pt on time with [facility] folder. D/W pt regarding scheduling conflict next session. Will work around. Pt reports impaired vision started in 2021 and he feels it is related to cysts. Was supposed to see a neuroopthamologist but ins did not cover. Encouraged to look for another. 2021:  Repeat MRI completed 2021. Pt sees surgeon on 2021. Speech in conversation is improved this date. 2021:  Review of pt med hx:  Cysts were drained as able and others appear to be shrinking on their own per MD.  Md is not exactly sure of what is causing this and is talking to doctors across the [place] for a definitive dx per pt. 2021:  Pt with limited verbalization in therapy this date. Objective:     Goal 1: The pt will complete oral motor exercises to improve strength and agility - 80% independently. Goal d/c'd  2021:  Reviewed and given for home practice. 2021:  Min cues. Continue. 2021:  Pt feels he can do these independently. Not addressed this date directly. 2021:  Not addressed this date.     Goal 2: The pt will read functional vocabulary provided by pt or 3 syllable words - 80% independently. 8/18/2021:  Pt did not bring HW folder and did not do HW per his report. Trials with 3 and 2 syllable word. Pt approx 80% with 1-syllable. Given for [facility].  8/23/2021;  30% independently with multiple attempts to self correct. 0/10 on 1st attempt. Sentence and paragraph reading given. 8/25/2021:  Pt with slow rate and errors with counting with various unpredictable numbers. Slow with LUIS. Given as HW warm ups along with singing simple songs. 8/30/2021:  Counting: upgraded to backward. LUIS: increased speed. Practice with 2 syllable, same initial phoneme phrases. Additional given plus basic tongue twisters. Attempts with singing: pt is not comfortable with this in therapy. Written, verbal and demonstration of strategy for trying to get a rhythm with speech: pt is low energy and monotone typically. Rhythm may improve productions. Pt is to bring in 2 songs he likes. 9/1/12021:  Tongue twisters reviewed:  Pt can do independently. Improves with repetition. · 3 syllable words/phrases:  100% with multiple repetitions allowed; 60% for 5 sets covering 5 phonemes (p,b,t,m,w). Phonemes are not randomized. · Practice with sentence and paragraph reading with primary goal this date to slow rate to allow improved production. ·  Lyrics to pt's 2 chosen songs printed and given to pt.    9/8/2021:  3 syllable words: 100% with repetition. After practice, 2 syllable words with 60% accurate on 1st attempt. Functional sentences: 90% accurate with min error x 1. Practice with functional vocab and carrier phrases given. Pt cued for steps in practice and how to make a task easier or more challenging. Pt demonstrates with min cues. Paragraph reading with cues to break sentences into phrases or words to achieve positive practice. Plan to establish scripts; pt unsure where he will want to go to work. 9/13/2021:  3 syllable words:  100% with repetition; 70% on 1st attempt.   Practice with functional sentences:  70% on 1st attempt; others with minor errors. Practice with conversational speech; hindered by pt's quiet demeanor, but 100% for short conversational responses. Practice with reading news on line out loud with cues for simplifying target and then builidng. Pt demonstrates ability to use this technique: given this task for [facility].    9/15/2021:  40% for 3 -syllable words on 1st attempt. Practice with reading news articles on line. Pt improves with cues for very slow rate. Education to work at a slow level and then we will work up to increased rate. Multi-syllabic words given to practice for apraxia and to practice the concept of viewing complex words as smaller parts (\"words\"). Trial with intial blends due to possible increased difficulty within reading task: no noted increased difficulty noted in isolation. Will continue to address in longer targets. 2021:    · 3 syllable:  50-70% for varying lists on 1st attempt. Puts into sentence to increase challenge. · Reading paragraphs out loud with practice with decision tree for how to practice at home:  0/5 sentences with out error. · Conversational turns:  Hesitation and repetition in 80% of sentences. Pt is difficult to elicit speech from. Goal 3: The pt will complete word fluency task with 10 items per category - 80% written or verbal.   Met and d/c'd.  2021:  2/3 attempts. Pt reports this is harder than it should be. Given for [facility].  2021: Additional given with instructions. 2021:  Goal met for recall of the words. Apraxia and/or paraphasias limits verbal output rate. Will f/u this goal x1.  2021:  Not addressed this date. 2021;  Not addressed this date. 2021:  Goal met within 60 seconds for word recall. Will f/u with pt to assure he feels word recall is [facility]. 2021: Word fluency: names 10 in 3 functional categories in 40 seconds or less. Deductive namin%.       Goal 4: The pt will read and follow instructions - 90% accurately independently  2021:  Not addressed this date. 2021;  Not addressed this date. 2021: In therapy, reading and following directions (informal assessment tool):  80% with self correction x1. D/w pt. Will monitor but not overly concerned due to unpredictable nature of this task. Pt in agreement. 2021:  Goal held. Goal 5: The pt will participate in formal cognitive assessment  2021:  Not addressed this date. 2021:  Trailmaking A:  53 seconds. B:  2 minutes. Excess time due to pt got stuck on 1 target. Reading comprehension ex given for [facility] with pt to record start and end times. 2021:  Not addressed this date. 2021:  Pt has not been doing \"much\" HW. Calendar given to assist with initiation and motivation. 2021:  Pt given challenge of HW 2x/day for 15-20 minutes each session. 2021:  Brain Injury Checklists returned and completed by pt and father. No new difficulties reports other than word finding and \"pronunciation. \"   2021:  Deductive namin%. 2021:  Not addressed this date. Goal 6: Additional goals to be developed as indicated. 2021:  Not addressed this date. 2021:   Reading comprehension for short magazine article: Accurate WNL. In therapy, reading and following directions (informal assessment tool):      Education:  2021:  Review of apraxia handout. Review of strategies:  Slow down, start again and new strategy practiced this date for think of the first letter which appears to benefit pt.    2021:  Strategies and tips/tricks typed up, reviewed and given to pt. Pt able to state 3 independently. Handout on neuroplasticity given and reviewed with pt questions answered. 2021:  Education on strategies and hierarchies for speech. Pt uses calendar and logs exercises 2x/day. Encouraged to continue. 2021:  Pt practiceing 2x/day per chart.   9/15/2021: Slow rate, breaking down long words into syllables. Pt practicing making decisions with home program for how to decrease/increase complexity depending on success with task. Assessment:   Diagnosis: Pt presents with signs of mild aphasia and mild apraxia of speech. Pt's father reports cognition is WNL, however testing will be planned to confirm. Pt appears to have significant pragmatice issues with poor eye contact, initiation of speech and flat affect. Pt father reports this is a premorbid behavior for the pt and is how pt would have behaved with a new person prior to recent medical events. Pt denies dysphagia and his father confirms this:  SLP will follow up in future session to confirm.     Plan: 2x/week for 5 weeks      Timed Code Treatment:  15    Total Treatment Time: 45    Signature:    Deborah Guthrie MS CCC/SLP [id number]  Speech Language Pathologist  8/18/2021  12:00 PM

## 2021-09-22 ENCOUNTER — HOSPITAL ENCOUNTER (OUTPATIENT)
Dept: SPEECH THERAPY | Age: 41
Setting detail: THERAPIES SERIES
Discharge: HOME | End: 2021-09-22
Payer: COMMERCIAL

## 2021-09-22 PROCEDURE — 92507 TX SP LANG VOICE COMM INDIV: CPT

## 2021-09-22 NOTE — PROGRESS NOTES
Speech-Language Pathology  Daily Treatment Note    Date:  2021    Patient Name:  Pj Lacy    :  1980  MRN: [id number]  Restrictions/Precautions:  Aphasia;apraxia  Diagnosis:  S/p craniotomy   Treatment Diagnosis:     Codes      Aphasia    -  Primary R47.01     Apraxia of speech          Insurance/Certification information:  [employer]  Referring Physician: AUDREY Saeed  Plan of care signed (Y/N):  N (transmission error reported; resending)  Visit# / total visits:  10  Pain level: scar pain is essentially resolved; no pain reported    Progress Note: []  Yes  []  No  Next due by: Visit #10 (start date; 2021)    Subjective:    2021:  Pt late to tx due to parking; additional outpt parking shown to pt and father. 2021;  [place] [facility] [place] D/W pt regarding scheduling conflict next session. Will work around. Pt reports impaired vision started in 2021 and he feels it is related to cysts. Was supposed to see a neuroopthamologist but ins did not cover. Encouraged to look for another. 2021:  Repeat MRI completed 2021. Pt sees surgeon on 2021. Speech in conversation is improved this date. 2021:  Review of pt med hx:  Cysts were drained as able and others appear to be shrinking on their own per MD.  Md is not exactly sure of what is causing this and [place] a definitive dx per pt. 2021:  Pt with limited verbalization in therapy this date. 2021:  Pt talks to no one but his father in the last week. Phone call with father with him reporting pt is doing better, but still has difficulty for tasks that are less automatic and more challenging to think about and then speak about. Objective:     Goal 1: The pt will complete oral motor exercises to improve strength and agility - 80% independently. Goal d/c'd  2021:  Reviewed and given for home practice.   2021: Paragraph reading with cues to break sentences into phrases or words to achieve positive practice. Plan to establish scripts; pt unsure where he will want to go to work. 9/13/2021:  3 syllable words:  100% with repetition; 70% on 1st attempt. Practice with functional sentences:  70% on 1st attempt; others with minor errors. Practice with conversational speech; hindered by pt's quiet demeanor, but 100% for short conversational responses. Practice with reading news on line out loud with cues for simplifying target and then builidng. Pt demonstrates ability to use this technique: given this task for [facility].    9/15/2021:  40% for 3 -syllable words on 1st attempt. Practice with reading news articles on line. Pt improves with cues for very slow rate. Education to work at a slow level and then we will work up to increased rate. Multi-syllabic words given to practice for apraxia and to practice the concept of viewing complex words as smaller parts (\"words\"). Trial with intial blends due to possible increased difficulty within reading task: no noted increased difficulty noted in isolation. Will continue to address in longer targets. 9/20/2021:    · 3 syllable:  50-70% for varying lists on 1st attempt. Puts into sentence to increase challenge. · Reading paragraphs out loud with practice with decision tree for how to practice at home:  0/5 sentences with out error. · Conversational turns:  Hesitation and repetition in 80% of sentences. Pt is difficult to elicit speech from. 9/22/2021:    · 3 syllable:  50-70% for varying lists on 1st attempt. Improved from 0% on 8/16/2021 to 70% this date on 1st try. · Sentence formulation:  50% on 1st attempt for verbal expression with no hesitations, repetitions. ·  Describe steps to increase cognitive challenge. 19/25 (76%)  · Reads news article orally:  10 sentences with 15 errors of hesitation or distortion. Article held back to use as baseline measure.  1.5 errors per sentence. Goal 3: The pt will complete word fluency task with 10 items per category - 80% written or verbal.   Met and d/c'd.  2021:  2/3 attempts. Pt reports this is harder than it should be. Given for [facility].  2021: Additional given with instructions. 2021:  Goal met for recall of the words. Apraxia and/or paraphasias limits verbal output rate. Will f/u this goal x1.  2021:  Not addressed this date. 2021;  Not addressed this date. 2021:  Goal met within 60 seconds for word recall. Will f/u with pt to assure he feels word recall is [facility]. 2021: Word fluency: names 10 in 3 functional categories in 40 seconds or less. Deductive namin%. Goal 4: The pt will read and follow instructions - 90% accurately independently. Partially met and d/c'd due to pt appears functional for needs and has no interest in pursuing further. 2021:  Not addressed this date. 2021;  Not addressed this date. 2021: In therapy, reading and following directions (informal assessment tool):  80% with self correction x1. D/w pt. Will monitor but not overly concerned due to unpredictable nature of this task. Pt in agreement. 2021:  Goal held. Goal 5: The pt will participate in formal cognitive assessment. Goal met. 2021:  Not addressed this date. 2021:  Trailmaking A:  53 seconds. B:  2 minutes. Excess time due to pt got stuck on 1 target. Reading comprehension ex given for [facility] with pt to record start and end times. 2021:  Not addressed this date. 2021:  Pt has not been doing \"much\" HW. Calendar given to assist with initiation and motivation. 2021:  Pt given challenge of HW 2x/day for 15-20 minutes each session. 2021:  Brain Injury Checklists returned and completed by pt and father. No new difficulties reports other than word finding and \"pronunciation. \"   2021:  Deductive namin%.     2021:  Not addressed

## 2021-09-22 NOTE — PROGRESS NOTES
Outpatient Speech Therapy  [] [facility]   Phone: [phone]   Fax: [phone]   [x] [facility]  Phone: [phone]   Fax: [phone]  [] [facility]   Phone: [phone]   Fax: [phone]     Speech Therapy Progress Note  Date: 2021        Patient Name:  Maci Prakash    :  1980  MRN: [id number]  Restrictions/Precautions:  Aphasia;apraxia  Diagnosis:  S/p craniotomy   Treatment Diagnosis:      Codes       Aphasia    -  Primary R47.01     Apraxia of speech            Insurance/Certification information:  [employer]  Referring Physician: AUDREY Elkins  Plan of care signed (Y/N):  N   Visit# / total visits:  10  Pain level: scar pain is essentially resolved; no pain reported       Time Period for Report:  2021-2021  Cancels/No-shows to date:  0    Plan of Care/Treatment to date:  [x] Speech-Language Evaluation/Treatment    [] Dysphagia Evaluation/Treatment        [] Dysphagia Treatment via Neuromuscular Electrical Stimulation (NMES)   [] Modified Barium Swallowing Study   [] Cognitive-Linguistic Skills Development  [] Voice evaluation and Treatment      [] Evaluation, modification, and Training of Voice Prosthetic     [] Evaluation for Speech-Generating Augmentative and Alternative Communication Device   [] Therapeutic Services for the use of Speech-Generating Device. [] Other:     Significant Findings At Last Visit/Comments:    Subjective:   Pt feels he is improving. Pt's father reports improvement but difficulty with more challenging conversational tasks. Objective:  Goal 1: The pt will complete oral motor exercises to improve strength and agility - 80% independently. Goal d/c'd    Goal 2: The pt will read functional vocabulary provided by pt or 3 syllable words - 80% independently. Partially met and improved from 0% to 70%.     Goal 3: The pt will complete word fluency task with 10 items per category - 80% written or verbal.   Met and d/c'd.    Goal 4: The pt will read and follow instructions - 90% accurately independently. Goal met. Goal 5: The pt will participate in formal cognitive assessment. Goal met. Via in therapy activities and completion of Brain Injury Checklist by pt and father, no cognitive deficits are noted. Goal 6: Additional goals to be developed as indicated. No new goals indicated during this period. Education:  8/23/2021:  Review of apraxia handout. Review of strategies:  Slow down, start again and new strategy practiced this date for think of the first letter which appears to benefit pt.    8/25/2021:  Strategies and tips/tricks typed up, reviewed and given to pt. Pt able to state 3 independently. Handout on neuroplasticity given and reviewed with pt questions answered. 9/8/2021:  Education on strategies and hierarchies for speech. Pt uses calendar and logs exercises 2x/day. Encouraged to continue. 9/13/2021:  Pt practiceing 2x/day per chart. 9/15/2021:  Slow rate, breaking down long words into syllables. Pt practicing making decisions with home program for how to decrease/increase complexity depending on success with task. Assessment:  Pt initially presented with mild aphasia and mild apraxia of speech.   Pt appeared to have significant pragmatice issues with poor eye contact, initiation of speech and flat affect.  Pt father reported this is a premorbid behavior for the pt and is how pt would have behaved with a new person prior to recent medical events  To date, pt has shown improvement in verbal expression with remaining deficit being a mild apraxia of speech. Challenges include pt's limited opportunities for socializing for functional practice due to his small friend/family group. Pt is currently has no plan to return to work due to visual deficits.       Progress towards goals:  Partially met ; see above for details    Current Frequency/Duration:  # Days per week: [] 1 day # Weeks: [] 1 week [] 4 weeks      [x] 2 days? [] 2 weeks [x] 5 weeks      [] 3 days   [] 3 weeks [] 6 weeks     Rehab Potential: [x] Excellent [] Good [] Fair  [] Poor     Goal Status:  [] Achieved [x] Partially Achieved  [] Not Achieved     Patient Status: [] Continue per initial plan of Care     [] Patient now discharged     [x] Additional visits requested, Please re-certify for additional visits:      Requested frequency/duration: 1-2  X/week for  5 - 8weeks    Electronically signed by:   Xander Us MS CCC/SLP [id number]  Speech Language Pathologist  09/22/21  11:30 AM        If you have any questions or concerns, please don't hesitate to call.   Thank you for your referral.    Physician Signature:________________________________Date:__________________  By signing above, therapists plan is approved by physician

## 2021-09-27 ENCOUNTER — HOSPITAL ENCOUNTER (OUTPATIENT)
Dept: SPEECH THERAPY | Age: 41
Setting detail: THERAPIES SERIES
Discharge: HOME | End: 2021-09-27
Payer: COMMERCIAL

## 2021-09-27 PROCEDURE — 97129 THER IVNTJ 1ST 15 MIN: CPT

## 2021-09-27 PROCEDURE — 92507 TX SP LANG VOICE COMM INDIV: CPT

## 2021-09-27 NOTE — PROGRESS NOTES
Speech-Language Pathology  Daily Treatment Note    Date:  2021    Patient Name:  Karthik Jones    :  1980  MRN: [id number]  Restrictions/Precautions:  Aphasia;apraxia  Diagnosis:  S/p craniotomy   Treatment Diagnosis:     Codes      Aphasia    -  Primary R47.01     Apraxia of speech          Insurance/Certification information:  [employer]  Referring Physician: AUDREY Tavares  Plan of care signed (Y/N):  N (transmission error reported; resending)  Visit# / total visits:  11  Pain level: scar pain is essentially resolved; no pain reported    Progress Note: []  Yes  []  No  Next due by: Visit #10 (start date; 2021)    Subjective:    2021:  Pt late to tx due to parking; additional outpt parking shown to pt and father. 2021;  Pt on time with [facility] folder. D/W pt regarding scheduling conflict next session. Will work around. Pt reports impaired vision started in 2021 and he feels it is related to cysts. Was supposed to see a neuroopthamologist but ins did not cover. Encouraged to look for another. 2021:  Repeat MRI completed 2021. Pt sees surgeon on 2021. Speech in conversation is improved this date. 2021:  Review of pt med hx:  Cysts were drained as able and others appear to be shrinking on their own per MD.  Md is not exactly sure of what is causing this and is talking to doctors across [place] for a definitive dx per pt. 2021:  Pt with limited verbalization in therapy this date. 2021:  Pt talks to no one but his father in the last week. Phone call with father with him reporting pt is doing better, but still has difficulty for tasks that are less automatic and more challenging to think about and then speak about. 2021:  Pt reports, when asked, if vision is improving. He reports it is getting better but is not back to baseline.          Objective:     Goal 1: The pt will read functional vocabulary is a premorbid behavior for the pt and is how pt would have behaved with a new person prior to recent medical events. Pt denies dysphagia and his father confirms this:  SLP will follow up in future session to confirm.     Plan: 2x/week for 5 weeks      Timed Code Treatment:  15    Total Treatment Time: 45    Signature:    Cheyenne Pérez MS CCC/SLP [id number]  Speech Language Pathologist  8/18/2021  11:28 AM

## 2021-09-29 ENCOUNTER — HOSPITAL ENCOUNTER (OUTPATIENT)
Dept: SPEECH THERAPY | Age: 41
Setting detail: THERAPIES SERIES
Discharge: HOME | End: 2021-09-29
Payer: COMMERCIAL

## 2021-09-29 PROCEDURE — 92507 TX SP LANG VOICE COMM INDIV: CPT

## 2021-09-29 NOTE — PROGRESS NOTES
Speech-Language Pathology  Daily Treatment Note    Date:  2021    Patient Name:  Jade Howard    :  1980  MRN: [id number]  Restrictions/Precautions:  Aphasia;apraxia  Diagnosis:  S/p craniotomy   Treatment Diagnosis:     Codes      Aphasia    -  Primary R47.01     Apraxia of speech          Insurance/Certification information:  [employer]  Referring Physician: NITIN Hobson-NP  Plan of care signed (Y/N):  N (transmission error reported; resending)  Visit# / total visits:  12  Pain level: scar pain is essentially resolved; no pain reported    Progress Note: []  Yes  []  No  Next due by: Visit #10 (start date; 2021)    Subjective:    2021:  Pt late to tx due to parking; additional outpt parking shown to pt and father. 2021;  Pt on time with [facility] folder. D/W pt regarding scheduling conflict next session. Will work around. Pt reports impaired vision started in 2021 and he feels it is related to cysts. Was supposed to see a neuroopthamologist but ins did not cover. Encouraged to look for another. 2021:  Repeat MRI completed 2021. Pt sees surgeon on 2021. Speech in conversation is improved this date. 2021:  Review of pt med hx:  Cysts were drained as able and others appear to be shrinking on their own per MD.  Md is not exactly sure of what is causing this and is talking to doctors across the country for a definitive dx per pt. 2021:  Pt with limited verbalization in therapy this date. 2021:  Pt talks to no one but his father in the last week. Phone call with father with him reporting pt is doing better, but still has difficulty for tasks that are less automatic and more challenging to think about and then speak about. 2021:  Pt reports, when asked, if vision is improving. He reports it is getting better but is not back to baseline.          Objective:     Goal 1: The pt will read functional vocabulary provided by pt or 3 syllable words - 80% independently. 9/27/2021:  3 syllable: 70% for practiced words  9/29/2021:  3 syllable:  70% 1st attempt with very minor error; 100% with self correction    Goal 2: Pt will formulate a sentence given a topic word - 80% independently with no error per sentence  9/27/2021:  60% on first attempt; 90% with 1 error/hesitation allowed  9/29/2021:  80% on 1st attempt    Goal 3: Read current news article - 80% of sentences with no errors  9/27/2021:  70% on 1st attempt; 100% with 1 error per sentence for 10 sentences  9/29/2021:  10 sentences with 9 errors; 40% on 1st attempts. Goal 4: Pt will describe steps for a task or situation - 5 steps with 80% of sentences without error  9/27/2021:  Pt 15/15 for simple tasks. Pt does not initiate making task difficult to elicit speech. Will reattempt with very clear tasks  9/29/2021:  70% of sentences without error. Errors are generally mild hesitations. Pt taped with feedback planned. Pt becomes more vocal than he has ever been and says he will leave if SLP plays the recording. .  When asked if explanation of benefit of feedback can be explained, pt says he does not care why. Feedback deleted. Attempts to clarify how pt feels he is doing are met with limited responses. Pt's outgoing phone message is the factory message so SLP can not hear pt's premorbid speech there. D/W pt possibility that he is done with speech. Pt is not sure if he should be done but thinks maybe: last session planned for 10/6/2021. Goal 5:  Pt will increase rate of speech with 3 word phrases at 55bpm - 80% accurate  9/27/2021:  2 word phrases: 90% accurate at 55 bpm    Education:  8/23/2021:  Review of apraxia handout. Review of strategies:  Slow down, start again and new strategy practiced this date for think of the first letter which appears to benefit pt.    8/25/2021:  Strategies and tips/tricks typed up, reviewed and given to pt.   Pt able to state 3 independently. Handout on neuroplasticity given and reviewed with pt questions answered. 9/8/2021:  Education on strategies and hierarchies for speech. Pt uses calendar and logs exercises 2x/day. Encouraged to continue. 9/13/2021:  Pt practiceing 2x/day per chart. 9/15/2021:  Slow rate, breaking down long words into syllables. Pt practicing making decisions with home program for how to decrease/increase complexity depending on success with task. 9/27/2021:  Cues for strategies, review of strategies. Assessment:   Diagnosis: Pt presents with signs of mild aphasia and mild apraxia of speech. Pt's father reports cognition is WNL, however testing will be planned to confirm. Pt appears to have significant pragmatice issues with poor eye contact, initiation of speech and flat affect. Pt father reports this is a premorbid behavior for the pt and is how pt would have behaved with a new person prior to recent medical events. Pt denies dysphagia and his father confirms this:  SLP will follow up in future session to confirm. Plan: 10/6/2021 is planned for final session.       Timed Code Treatment:  0    Total Treatment Time: 39    Signature:    Brooks Wood MS CCC/SLP [id number]  Speech Language Pathologist  8/18/2021  11:19 AM

## 2021-10-04 ENCOUNTER — HOSPITAL ENCOUNTER (OUTPATIENT)
Dept: MRI IMAGING | Age: 41
Discharge: HOME | End: 2021-10-04
Payer: COMMERCIAL

## 2021-10-04 ENCOUNTER — APPOINTMENT (OUTPATIENT)
Dept: SPEECH THERAPY | Age: 41
End: 2021-10-04
Payer: COMMERCIAL

## 2021-10-04 DIAGNOSIS — D49.6 NEOPLASM, BRAIN (HCC): ICD-10-CM

## 2021-10-04 PROCEDURE — 70553 MRI BRAIN STEM W/O & W/DYE: CPT

## 2021-10-04 PROCEDURE — A9577 INJ MULTIHANCE: HCPCS | Performed by: NEUROLOGICAL SURGERY

## 2021-10-04 PROCEDURE — 6360000004 HC RX CONTRAST MEDICATION: Performed by: NEUROLOGICAL SURGERY

## 2021-10-04 RX ADMIN — GADOBENATE DIMEGLUMINE 15 ML: 529 INJECTION, SOLUTION INTRAVENOUS at 14:03

## 2021-10-06 ENCOUNTER — HOSPITAL ENCOUNTER (OUTPATIENT)
Dept: SPEECH THERAPY | Age: 41
Setting detail: THERAPIES SERIES
Discharge: HOME | End: 2021-10-06
Payer: COMMERCIAL

## 2021-10-06 PROCEDURE — 97129 THER IVNTJ 1ST 15 MIN: CPT

## 2021-10-06 PROCEDURE — 92507 TX SP LANG VOICE COMM INDIV: CPT

## 2021-10-06 NOTE — PROGRESS NOTES
Outpatient Speech Therapy  [] [facility]   Phone: [phone]   Fax: [phone]   [x] [facility]  Phone: [phone]   Fax: [phone]  [] [facility]   Phone: [phone]   Fax: [phone]     Speech Therapy Progress/Discharge Note  Date: 10/6/2021        Patient Name:  Willie Gilliam    :  1980  MRN: [id number]  Restrictions/Precautions:  Aphasia;apraxia  Diagnosis:  S/p craniotomy   Treatment Diagnosis:      Codes       Aphasia    -  Primary R47.01     Apraxia of speech            Insurance/Certification information:  [employer]  Referring Physician: AUDREY Carter  Plan of care signed (Y/N):  N (transmission error reported; resending)  Visit# / total visits:  13  Pain level: scar pain is essentially resolved; no pain reported       Time Period for Report:  2021-10/6/2021  Cancels/No-shows to date:  0    Plan of Care/Treatment to date:  [x] Speech-Language Evaluation/Treatment    [] Dysphagia Evaluation/Treatment        [] Dysphagia Treatment via Neuromuscular Electrical Stimulation (NMES)   [] Modified Barium Swallowing Study   [x] Cognitive-Linguistic Skills Development  [] Voice evaluation and Treatment      [] Evaluation, modification, and Training of Voice Prosthetic     [] Evaluation for Speech-Generating Augmentative and Alternative Communication Device   [] Therapeutic Services for the use of Speech-Generating Device. [] Other:     Significant Findings At Last Visit/Comments:    Subjective:  10/6/2021:  D/W pt that his speech is doing well, he can always convey his message, that SLP feels pt is too critical of himself, and that his rate of speech is WNL. Objective:    Goal 1: The pt will read functional vocabulary provided by pt or 3 syllable words - 80% independently. Goal met.   10/6/2021:   3 syllable:  90% 1st attempt with very minor error; 100% with self correction     Goal 2: Pt will formulate a sentence given a topic word - 80% independently with no error per sentence. Partially met. Varies 60-80%. Errors are minor.     Goal 3: Read current news article - 80% of sentences with no errors. Goal met. 10/6/2021:  10 sentences with 2 errors; 80% on 1st attempt.     Goal 4: Pt will describe steps for a task or situation - 5 steps with 80% of sentences without error. Goal met. 10/6/2021:  80% of sentences without error in conversations around sports. Pt is slow to involve in the conversation, but then proceeds with [facility] accuracy and content.        Goal 5:  Pt will increase rate of speech with 3 word phrases at 55bpm - 80% accurate. Not met. Gold discontinued. Assessment:  Pt has shown improvement in verbal expression. Cognition appears [facility] and at pt's baseline per pt and his father. Hesitations and sound repetitions do persist in conversation, but do not limit the pt's ability to communicate. Pt is trained in home program for ongoing practice and is aware that he can return for therapy with a new MD order should he feel the need for further assistance. Pt's lack of social opportunities is a concern for his ongoing improvement. Pt has premorbid limited socializing with poor eye contact noted ongoing in therapy, which is also premorbid per pt and his father. Pt does not feel he can return to work due to his vision. Seeking a volunteer opportunity is suggested to increase socialization for ongoing practice; pt does not refuse this idea. At this time pt is discharged with a home program that he is generally independent in completing. Progress towards goals:  See above. Current Frequency/Duration:  # Days per week: [] 1 day # Weeks: [] 1 week [] 4 weeks      [x] 2 days?    [] 2 weeks [x] 5 weeks      [] 3 days   [] 3 weeks [] 6 weeks     Rehab Potential: [] Excellent [x] Good [] Fair  [] Poor     Goal Status:  [] Achieved [x] Partially Achieved  [] Not Achieved     Patient Status: [] Continue per initial plan of Care     [x] Patient now discharged     [] Additional visits requested, Please re-certify for additional visits:      Requested frequency/duration:  X/week for weeks    Electronically signed by:    Nicole Foss MS CCC/SLP [id number]  Speech Language Pathologist  10/06/21   11:28 AM      If you have any questions or concerns, please don't hesitate to call.   Thank you for your referral.    Physician Signature:________________________________Date:__________________  By signing above, therapists plan is approved by physician

## 2021-10-06 NOTE — PROGRESS NOTES
Speech-Language Pathology  Daily Treatment Note    Date:  10/6/2021    Patient Name:  Jose Castañeda    :  1980  MRN: [id number]  Restrictions/Precautions:  Aphasia;apraxia  Diagnosis:  S/p craniotomy   Treatment Diagnosis:     Codes      Aphasia    -  Primary R47.01     Apraxia of speech          Insurance/Certification information:  [employer]  Referring Physician: AUDREY Kc  Plan of care signed (Y/N):  N (transmission error reported; resending)  Visit# / total visits:  13  Pain level: scar pain is still a little tight. Progress Note: []  Yes  []  No  Next due by: Visit #10 (start date; 2021)    Subjective:    2021:  Pt late to tx due to parking; additional outpt parking shown to pt and father. 2021;  Pt on time with [facility] folder. D/W pt regarding scheduling conflict next session. Will work around. Pt reports impaired vision started in 2021 and he feels it is related to cysts. Was supposed to see a neuroopthamologist but ins did not cover. Encouraged to look for another. 2021:  Repeat MRI completed 2021. Pt sees surgeon on 2021. Speech in conversation is improved this date. 2021:  Review of pt med hx:  Cysts were drained as able and others appear to be shrinking on their own per MD.  Md is not exactly sure of what is causing this and [place] dx per pt. 2021:  Pt with limited verbalization in therapy this date. 2021:  Pt talks to no one but his father in the last week. Phone call with father with him reporting pt is doing better, but still has difficulty for tasks that are less automatic and more challenging to think about and then speak about. 2021:  Pt reports, when asked, if vision is improving. He reports it is getting better but is not back to baseline.     10/6/2021:  D/W pt that his speech is doing well, he can always convey his message, that SLP feels pt is too critical of himself, and that his rate of speech is WNL. Objective:     Goal 1: The pt will read functional vocabulary provided by pt or 3 syllable words - 80% independently. 9/27/2021:  3 syllable: 70% for practiced words  9/29/2021:  3 syllable:  70% 1st attempt with very minor error; 100% with self correction  10/6/2021:   3 syllable:  90% 1st attempt with very minor error; 100% with self correction    Goal 2: Pt will formulate a sentence given a topic word - 80% independently with no error per sentence  9/27/2021:  60% on first attempt; 90% with 1 error/hesitation allowed  9/29/2021:  80% on 1st attempt  10/6/2021:  60% on 1st attempt    Goal 3: Read current news article - 80% of sentences with no errors  9/27/2021:  70% on 1st attempt; 100% with 1 error per sentence for 10 sentences  9/29/2021:  10 sentences with 9 errors; 40% on 1st attempts. 10/6/2021:  10 sentences with 2 errors; 80% on 1st attempt. Goal 4: Pt will describe steps for a task or situation - 5 steps with 80% of sentences without error  9/27/2021:  Pt 15/15 for simple tasks. Pt does not initiate making task difficult to elicit speech. Will reattempt with very clear tasks  9/29/2021:  70% of sentences without error. Errors are generally mild hesitations. Pt taped with feedback planned. Pt becomes more vocal than he has ever been and says he will leave if SLP plays the recording. .  When asked if explanation of benefit of feedback can be explained, pt says he does not care why. Feedback deleted. Attempts to clarify how pt feels he is doing are met with limited responses. Pt's outgoing phone message is the factory message so SLP can not hear pt's premorbid speech there. D/W pt possibility that he is done with speech. Pt is not sure if he should be done but thinks maybe: last session planned for 10/6/2021.    10/6/2021:  80% of sentences without error in conversations around sports.   Pt is slow to involve in the conversation, but then proceeds with [facility] accuracy and content. Goal 5:  Pt will increase rate of speech with 3 word phrases at 55bpm - 80% accurate  9/27/2021:  2 word phrases: 90% accurate at 55 bpm  10/6/2021:  Not addressed this date. Education:  8/23/2021:  Review of apraxia handout. Review of strategies:  Slow down, start again and new strategy practiced this date for think of the first letter which appears to benefit pt.    8/25/2021:  Strategies and tips/tricks typed up, reviewed and given to pt. Pt able to state 3 independently. Handout on neuroplasticity given and reviewed with pt questions answered. 9/8/2021:  Education on strategies and hierarchies for speech. Pt uses calendar and logs exercises 2x/day. Encouraged to continue. 9/13/2021:  Pt practiceing 2x/day per chart. 9/15/2021:  Slow rate, breaking down long words into syllables. Pt practicing making decisions with home program for how to decrease/increase complexity depending on success with task. 9/27/2021:  Cues for strategies, review of strategies. 10/6/2021:  Home program given with added exercises. D/W pt possibility of volunteering somewhere to assist with real life practice for speech. Pt is not necessarily against this idea. Practice with ordering food in role play with SLP. Pt completes activity WFL, but concedes he rarely orders over the phone and is unable to provide any other real life situations he might need to address. Assessment:   Diagnosis: Pt presents with signs of mild aphasia and mild apraxia of speech. Pt's father reports cognition is WNL, however testing will be planned to confirm. Pt appears to have significant pragmatice issues with poor eye contact, initiation of speech and flat affect. Pt father reports this is a premorbid behavior for the pt and is how pt would have behaved with a new person prior to recent medical events.   Pt denies dysphagia and his father confirms this:  SLP will follow up in future session to confirm. 10/6/2021:  Pt has shown improvement in verbal expression. Cognition appears [facility] and at pt's baseline per pt and his father. Hesitations and sound repetitions do persist in conversation, but do not limit the pt's ability to communicate. Pt is trained in home program for ongoing practice and is aware that he can return for therapy with a new MD order should he feel the need for further assistance. Pt's lack of social opportunities is a concern for his ongoing improvement. Pt has premorbid limited socializing with poor eye contact noted ongoing in therapy, which is also premorbid per pt and his father. Pt does not feel he can return to work due to his vision. Seeking a volunteer opportunity is suggested to increase socialization for ongoing practice:pt does not refuse this idea. .  At this time pt is discharged with a home program that he is generally independent in completing.     Plan: discharge from speech therapy      Timed Code Treatment: 15    Total Treatment Time: 39    Signature:    Julien Lomax 87 CCC/SLP [id number]  Speech Language Pathologist  8/18/2021  11:25 AM

## 2021-10-11 ENCOUNTER — APPOINTMENT (OUTPATIENT)
Dept: SPEECH THERAPY | Age: 41
End: 2021-10-11
Payer: COMMERCIAL

## 2022-04-09 ENCOUNTER — HOSPITAL ENCOUNTER (OUTPATIENT)
Dept: MRI IMAGING | Age: 42
Discharge: HOME | End: 2022-04-09
Payer: COMMERCIAL

## 2022-04-09 DIAGNOSIS — G93.89 MASS OF BRAIN: ICD-10-CM

## 2022-04-09 PROCEDURE — 6360000004 HC RX CONTRAST MEDICATION: Performed by: NEUROLOGICAL SURGERY

## 2022-04-09 PROCEDURE — A9577 INJ MULTIHANCE: HCPCS | Performed by: NEUROLOGICAL SURGERY

## 2022-04-09 PROCEDURE — 70553 MRI BRAIN STEM W/O & W/DYE: CPT

## 2022-04-09 RX ADMIN — GADOBENATE DIMEGLUMINE 15 ML: 529 INJECTION, SOLUTION INTRAVENOUS at 14:12

## 2022-10-13 ENCOUNTER — HOSPITAL ENCOUNTER (OUTPATIENT)
Dept: MRI IMAGING | Age: 42
Discharge: HOME | End: 2022-10-13
Payer: COMMERCIAL

## 2022-10-13 DIAGNOSIS — G93.89 MASS OF BRAIN: ICD-10-CM

## 2022-10-13 PROCEDURE — 70553 MRI BRAIN STEM W/O & W/DYE: CPT

## 2022-10-13 PROCEDURE — 6360000004 HC RX CONTRAST MEDICATION: Performed by: NEUROLOGICAL SURGERY

## 2022-10-13 PROCEDURE — A9577 INJ MULTIHANCE: HCPCS | Performed by: NEUROLOGICAL SURGERY

## 2022-10-13 RX ADMIN — GADOBENATE DIMEGLUMINE 15 ML: 529 INJECTION, SOLUTION INTRAVENOUS at 16:17

## 2023-06-27 NOTE — PROGRESS NOTES
NEUROSURGERY POST-OP PROGRESS NOTE    Patient Name: Pj Lacy YOB: 1980   Sex: Male Age: 46 yrs     Medical Record Number: [id number] Acct Number: [de-identified]   Room Number: 9678/9600-07 Hospital Day: Hospital Day: 5     Interval History:  Post-operative Day# 4 s/p Procedure(s) (LRB):  RIGHT FRONTALTEMPORAL CRANIOTOMY FOR EVACUATION OF COLLECTONS AND DIAGNOSIS: LEFT PARIETAL CRANIOTOMY FOR EVACUATION OF COLLECTIONS (Right)    Subjective: Pt able to say yes and no to questions. He can say his first name. Still having word finding issues but better. Objective:    VITAL SIGNS   /64   Pulse 59   Temp 98.1 °F (36.7 °C) (Oral)   Resp 16   Ht 5' 10\" (1.778 m)   Wt 176 lb 2.4 oz (79.9 kg)   SpO2 97%   BMI 25.27 kg/m²    Height Height: 5' 10\" (177.8 cm)   Weight Weight: 176 lb 2.4 oz (79.9 kg)        Allergies No Known Allergies   NPO Status ADULT DIET; Dysphagia - Pureed   Isolation No active isolations     LABS   Basic Metabolic Profile Recent Labs     08/01/21  0423 08/02/21  0528 08/03/21  0501    136 136    102 102   CO2 28 24 24   BUN 11 16 18   CREATININE 0.8* 0.7* 0.7*   GLUCOSE 129* 110* 114*   PHOS 3.2 3.8 3.5   MG 2.60* 2.80* 2.50*      Complete Blood Count Recent Labs     08/01/21  0844 08/02/21  0930 08/03/21  0501   WBC 15.2* 10.6 9.2   RBC 4.39 4.67 4.27      Coagulation Studies No results for input(s): PTT, INR in the last 72 hours.     Invalid input(s): PLATELETS, PROA, PT, PTTA     MEDICATIONS   Inpatient Medications     vancomycin, 1,500 mg, Intravenous, Q8H    levETIRAcetam, 500 mg, Oral, BID    heparin (porcine), 5,000 Units, Subcutaneous, 3 times per day    cefepime, 2,000 mg, Intravenous, Q8H    sodium chloride flush, 10 mL, Intravenous, 2 times per day    polyethylene glycol, 17 g, Oral, Daily    sennosides-docusate sodium, 1 tablet, Oral, BID    dexamethasone, 4 mg, Intravenous, Q6H    pantoprazole, 40 mg, Oral, QAM AC   Infusions    sodium chloride 25 mL (08/02/21 1315)      Antibiotics   Recent Abx Admin                   vancomycin (VANCOCIN) 1,250 mg in sodium chloride 0.9 % 250 mL IVPB (mg) 1,250 mg New Bag 08/03/21 0509     1,250 mg New Bag 08/02/21 2015     1,250 mg New Bag  1315    cefepime (MAXIPIME) 2000 mg IVPB minibag in NS (mg) 2,000 mg New Bag 08/03/21 0129     2,000 mg New Bag 08/02/21 1706     2,000 mg New Bag  0945                 Neurologic Exam:  Mental status: awake and alert with delayed responses to questions. Cranial Nerves:  II: Visual acuity not tested  III, IV, VI: PERRL, 3 mm bilaterally, EOMI, no nystagmus noted  V: Facial sensation intact bilaterally to touch  VII: Face symmetric  VIII: Hearing intact bilaterally to spoken voice  IX: Palate movement equal bilaterally  XI: Shoulder shrug equal bilaterally  XII: Tongue midline      Musculoskeletal:   Gait: Not tested   Tone: normal  Sensory: intact to all extremities  Motor strength:    Right  Left    Right  Left    Deltoid  5 5  Hip Flex  5 5   Biceps  5 5  Knee Extensors  5 5   Triceps  5 5  Knee Flexors  5 5   Wrist Ext  5 5  Ankle Dorsiflex. 5 5   Wrist Flex  5 5  Ankle Plantarflex. 5 5   Handgrip  5 5  Ext Jus Longus  5 5   Thumb Ext  5 5         Incision: intact, clean and dry      Respiratory:  Unlabored respiratory pattern    Abdomen:   Soft, ND       Cardiovascular:  Warm, well perfused    Assessment   Patient is a 45 yo M s/p Procedure(s) (LRB):  RIGHT FRONTALTEMPORAL CRANIOTOMY FOR EVACUATION OF COLLECTONS AND DIAGNOSIS: LEFT PARIETAL CRANIOTOMY FOR EVACUATION OF COLLECTIONS (Right) per Dr. Rosalind Hitchcock . Pt father educated from \"Your Guide to Neurosurgery\" book pg 33-38. Plan:  1. Neurologic exam frequency:Q4H  2. Mobility:PT/OT, OOB as tolerated   3.  Steroids: Decadron 4mg Q6H  4. Seizure Prophylaxis: Keppra 500mg BID  5. Diet: per primary team   6. Antibiotics: Cefepime Q8H, Vancomycin Q8H, Infectious Disease consulted-appreciate recs   7. DVT Prophylaxis: SCDs   8. GI Prophylaxis: Protonix  9. Bowel Regimen: Glycolax, Senna  10. Pain control: PRN Tylenol, Oxycodone  11. Incisional Care: RICARDO  12. Dispo Planning: pending cytology results        Patient was discussed with Dr. Bia Troncoso who agrees with above assessment and plan. Electronically signed by:  NITIN Joe CNP, 8/3/2021 9:26 AM   Neurosurgery Nurse Practitioner  [phone] [Negative] : Heme/Lymph [FreeTextEntry5] : See HPI [de-identified] : see hpi

## 2023-09-21 NOTE — PROGRESS NOTES
Neurosurgery Progress Note    Patient seen and examined on 08/03/21. No acute events overnight. Oriented to person. Continues to have word finding difficulty.        A/P: 47 yo M POD #4 s/p right frontotemporal craniotomy for evacuation of collections and left parietal craniotomy for evacuation of collections.        - Neurologic exam frequency: Q4H  - Mobility:PT/OT, OOB as tolerated   - Steroids: Decadron 4mg Q6H  - Seizure Prophylaxis: Keppra 500mg BID  - Diet: per primary team   - Antibiotics: Cefepime Q8H, Vancomycin Q8H, Infectious Disease consulted-appreciate recs   - DVT Prophylaxis: SCDs   - GI Prophylaxis: Protonix  - Bowel Regimen: Glycolax, Senna  - Pain control: PRN Tylenol, Oxycodone  - Incisional Care: RICARDO  - Dispo Planning: pending cytology results     BOB Cristina [facility], [address] [facility],[address] (o) Use Enhanced Medication Counseling?: No

## 2023-11-20 ENCOUNTER — HOSPITAL ENCOUNTER (OUTPATIENT)
Dept: MRI IMAGING | Age: 43
Discharge: HOME | End: 2023-11-20
Payer: COMMERCIAL

## 2023-11-20 DIAGNOSIS — G93.89 BRAIN DYSFUNCTION: ICD-10-CM

## 2023-11-20 PROCEDURE — A9579 GAD-BASE MR CONTRAST NOS,1ML: HCPCS | Performed by: PHYSICIAN ASSISTANT

## 2023-11-20 PROCEDURE — 6360000004 HC RX CONTRAST MEDICATION: Performed by: PHYSICIAN ASSISTANT

## 2023-11-20 PROCEDURE — 70553 MRI BRAIN STEM W/O & W/DYE: CPT

## 2023-11-20 RX ADMIN — GADOTERIDOL 15 ML: 279.3 INJECTION, SOLUTION INTRAVENOUS at 17:33

## (undated) DEVICE — COTTON BALLS, DOUBLE STRUNG: Brand: DEROYAL

## (undated) DEVICE — HOOK RETRCT 12MM S STL BLNT E STAY LONE STAR

## (undated) DEVICE — CATHETER IV 14GA L5.25IN PERIPH ORNG FEP POLYMER 3 BVL

## (undated) DEVICE — SPONGE,LAP,18"X18",DLX,XR,ST,5/PK,40/PK: Brand: MEDLINE

## (undated) DEVICE — GLOVE SURG SZ 75 L12IN FNGR THK94MIL TRNSLUC YEL LTX

## (undated) DEVICE — CODMAN® SURGICAL PATTIES 1/4" X 1/4" (0.64CM X 0.64CM): Brand: CODMAN®

## (undated) DEVICE — PIN ADLT MAYFIELD RIGID MOLD FINGER

## (undated) DEVICE — TELFA 1" X 3": Brand: TELFA

## (undated) DEVICE — DRAPE MICSCP W54XL150IN W/ 4 BINOC GLS LENS LEICA

## (undated) DEVICE — SOLUTION IV 500ML 0.9% SOD CHL PH 5 INJ USP VIAFLX PLAS

## (undated) DEVICE — MARKER SURG SKIN UTIL BLK REG TIP NONSMEARING W/ 6IN RUL

## (undated) DEVICE — GLOVE SURG SZ 65 THK91MIL LTX FREE SYN POLYISOPRENE

## (undated) DEVICE — PLATE ES AD W 9FT CRD 2

## (undated) DEVICE — INTENDED USE FOR SURGICAL MARKING ON INTACT SKIN, ALSO PROVIDES A PERMANENT METHOD OF IDENTIFYING OBJECTS IN THE OPERATING ROOM: Brand: WRITESITE® PLUS MINI PREP RESISTANT MARKER

## (undated) DEVICE — SEALANT SURG 13 YR DURA AUTOSPRAY ADHERUS NUS106] SURGICAL ONE]

## (undated) DEVICE — TOOL 9BA60 LEGEND 9CM 6MM BA: Brand: MIDAS REX

## (undated) DEVICE — BLADE CLIPPER SURG SENSICLIP

## (undated) DEVICE — SSC BONE WAX: Brand: SSC BONE WAX

## (undated) DEVICE — PROTECTOR ULN NRV PUR FOAM HK LOOP STRP ANATOMICALLY

## (undated) DEVICE — JEWISH CRANI PACK: Brand: MEDLINE INDUSTRIES, INC.

## (undated) DEVICE — APPLICATOR MEDICATED 26 CC SOLUTION HI LT ORNG CHLORAPREP

## (undated) DEVICE — PAD,NON-ADHERENT,3X8,STERILE,LF,1/PK: Brand: MEDLINE

## (undated) DEVICE — 3L THIN WALL CAN: Brand: CRD

## (undated) DEVICE — BANDAGE,GAUZE,BULKEE II,4.5"X4.1YD,STRL: Brand: MEDLINE

## (undated) DEVICE — GLOVE SURG SZ 7 L12IN FNGR THK79MIL GRN LTX FREE

## (undated) DEVICE — SURE SET-DOUBLE BASIN-LF: Brand: MEDLINE INDUSTRIES, INC.

## (undated) DEVICE — SPONGE,NEURO,0.5"X3",XR,STRL,LF,10/PK: Brand: MEDLINE

## (undated) DEVICE — STAPLER SKIN H3.9MM WIRE DIA0.58MM CRWN 6.9MM 35 STPL ROT

## (undated) DEVICE — MARKER REFLECTIVE REFLECTIVE TWST ON SPHERZ 5PK

## (undated) DEVICE — SPONGE GZ W4XL4IN COT 12 PLY TYP VII WVN C FLD DSGN

## (undated) DEVICE — TOOL F2/8TA23 LEGEND 8CM 2.3MM TAPER: Brand: MIDAS REX™

## (undated) DEVICE — 3M™ TEGADERM™ TRANSPARENT FILM DRESSING FRAME STYLE, 1624W, 2-3/8 IN X 2-3/4 IN (6 CM X 7 CM), 100/CT 4CT/CASE: Brand: 3M™ TEGADERM™

## (undated) DEVICE — ADAPTER LAB INTMED LUER 17GA

## (undated) DEVICE — SOLUTION IV 1000ML 0.9% SOD CHL

## (undated) DEVICE — UNDERGLOVE SURG SZ 8 BLU LTX FREE SYN POLYISOPRENE POLYMER

## (undated) DEVICE — COUNTER NDL 40 COUNT HLD 70 NUM FOAM BLK SGL MAG W BLDE REMV

## (undated) DEVICE — COVER,TABLE,HEAVY DUTY,77"X90",STRL: Brand: MEDLINE

## (undated) DEVICE — PRESSURE MONITORING SET: Brand: TRUWAVE, VAMP PLUS

## (undated) DEVICE — DRESSING GERM DIA1IN CNTR H DIA7MM BLU CHG ANTIMIC PROTCT

## (undated) DEVICE — STYLET GUID PRECALIBRATED INCORPORATED PASS MRK PLT DISP

## (undated) DEVICE — COVER,MAYO STAND,XL,STERILE: Brand: MEDLINE

## (undated) DEVICE — SUTURE NRLN SZ 4-0 L18IN NONABSORBABLE BLK L13MM TF 1/2 CIR C584D

## (undated) DEVICE — ANES EXTENSION SET 90IN-LF: Brand: MEDLINE INDUSTRIES, INC.

## (undated) DEVICE — SYRINGE MED 3ML CLR PLAS STD N CTRL LUERLOCK TIP DISP

## (undated) DEVICE — AGENT HEMSTAT W2XL14IN OXIDIZED REGENERATED CELOS ABSRB FOR

## (undated) DEVICE — NEURO SPONGES: Brand: DEROYAL

## (undated) DEVICE — KIT CATHETER 20GA L12CM ART CUST

## (undated) DEVICE — SUTURE VCRL SZ 2-0 L18IN ABSRB UD CT-1 L36MM 1/2 CIR J839D

## (undated) DEVICE — TOWEL,OR,DSP,ST,WHITE,DLX,4/PK,20PK/CS: Brand: MEDLINE

## (undated) DEVICE — TOWEL,STOP FLAG GOLD N-W: Brand: MEDLINE

## (undated) DEVICE — COVER LT HNDL CAM BLU DISP W/ SURG CTRL

## (undated) DEVICE — JEWISH HOSPITAL TURNOVER KIT: Brand: MEDLINE INDUSTRIES, INC.

## (undated) DEVICE — Z DISCONTINUED USE 2272117 DRAPE SURG 3 QTR N INVASIVE 2 LAYR DISP

## (undated) DEVICE — GARMENT,MEDLINE,DVT,INT,CALF,MED, GEN2: Brand: MEDLINE

## (undated) DEVICE — SPONGE 300501 MEROCEL 100PK 1.3 X 1.3CM: Brand: MEROCEL®

## (undated) DEVICE — BLANKET WRM W40.2XL55.9IN IORT LO BODY + MISTRAL AIR